# Patient Record
Sex: FEMALE | Race: WHITE | HISPANIC OR LATINO | Employment: FULL TIME | URBAN - METROPOLITAN AREA
[De-identification: names, ages, dates, MRNs, and addresses within clinical notes are randomized per-mention and may not be internally consistent; named-entity substitution may affect disease eponyms.]

---

## 2018-05-09 ENCOUNTER — OFFICE VISIT (OUTPATIENT)
Dept: OBGYN CLINIC | Facility: CLINIC | Age: 43
End: 2018-05-09
Payer: COMMERCIAL

## 2018-05-09 VITALS
DIASTOLIC BLOOD PRESSURE: 79 MMHG | WEIGHT: 193 LBS | HEIGHT: 60 IN | HEART RATE: 73 BPM | BODY MASS INDEX: 37.89 KG/M2 | SYSTOLIC BLOOD PRESSURE: 116 MMHG

## 2018-05-09 DIAGNOSIS — Z72.51 HIGH RISK SEXUAL BEHAVIOR: ICD-10-CM

## 2018-05-09 DIAGNOSIS — Z30.013 ENCOUNTER FOR INITIAL PRESCRIPTION OF INJECTABLE CONTRACEPTIVE: ICD-10-CM

## 2018-05-09 DIAGNOSIS — Z12.39 SCREENING FOR MALIGNANT NEOPLASM OF BREAST: ICD-10-CM

## 2018-05-09 DIAGNOSIS — Z86.32 HISTORY OF GESTATIONAL DIABETES: ICD-10-CM

## 2018-05-09 DIAGNOSIS — Z01.419 ENCOUNTER FOR GYNECOLOGICAL EXAMINATION WITHOUT ABNORMAL FINDING: Primary | ICD-10-CM

## 2018-05-09 LAB — SL AMB POCT URINE HCG: NEGATIVE

## 2018-05-09 PROCEDURE — G0145 SCR C/V CYTO,THINLAYER,RESCR: HCPCS | Performed by: PATHOLOGY

## 2018-05-09 PROCEDURE — 87624 HPV HI-RISK TYP POOLED RSLT: CPT | Performed by: NURSE PRACTITIONER

## 2018-05-09 PROCEDURE — 81025 URINE PREGNANCY TEST: CPT | Performed by: NURSE PRACTITIONER

## 2018-05-09 PROCEDURE — G0124 SCREEN C/V THIN LAYER BY MD: HCPCS | Performed by: PATHOLOGY

## 2018-05-09 PROCEDURE — 87591 N.GONORRHOEAE DNA AMP PROB: CPT | Performed by: NURSE PRACTITIONER

## 2018-05-09 PROCEDURE — 87491 CHLMYD TRACH DNA AMP PROBE: CPT | Performed by: NURSE PRACTITIONER

## 2018-05-09 PROCEDURE — 99386 PREV VISIT NEW AGE 40-64: CPT | Performed by: NURSE PRACTITIONER

## 2018-05-09 RX ORDER — MEDROXYPROGESTERONE ACETATE 150 MG/ML
150 INJECTION, SUSPENSION INTRAMUSCULAR
Qty: 1 ML | Refills: 3 | Status: SHIPPED | OUTPATIENT
Start: 2018-05-09 | End: 2019-04-05 | Stop reason: SDUPTHER

## 2018-05-09 NOTE — PROGRESS NOTES
Diagnoses and all orders for this visit:    Encounter for gynecological examination without abnormal finding    Screening for malignant neoplasm of breast  -     Mammo screening bilateral w cad; Future    Encounter for initial prescription of injectable contraceptive  -     medroxyPROGESTERone (DEPO-PROVERA) 150 mg/mL injection; Inject 1 mL (150 mg total) into the shoulder, thigh, or buttocks every 3 (three) months    History of gestational diabetes  -     Glucose JOSE DE JESUS 2HR 75GM Nonpreg; Future              ASSESSMENT & PLAN: Natalya Sanchez is a 36 y o  G2 G2 with normal gynecologic exam  She has a new partner and desires contraception  She has been using condoms consistently for contraception  Her LMP was 04/29/2018 and still has some spotting today  Her menses are monthly and weekly last 3 days  She has never had a mammogram   She denies any abnormal history  History of gestational diabetes not been checked since she delivered 2 years ago  Patient does not have a PCP  She desires cultures for Chlamydia and gonorrhea    1  Routine well woman exam done today  2  Pap and HPV:  The patient's last pap and hpv was in 2016  It was normal     Pap and cotesting was done today  Current ASCCP Guidelines reviewed  3   Mammogram ordered, has never had a MMG  4  The following were reviewed in today's visit: breast self exam, STD testing, adequate intake of calcium and vitamin D, exercise and healthy diet  Culture for [facility] and CT sent today  5  Patient counseled on Depo Provera and irregular bleeding, weight gain, and bone health  Return to office to begin Depo-Provera after picking it up from the pharmacy, reviewed backup method for 1 week, return to office her next Depo in 11 weeks    CC:  Annual Gynecologic Examination    HPI: Natalya Sanchez is a 36 y o    who presents for annual gynecologic examination    She has the following concerns:  She desires contraception    Health Maintenance:    She wears her seatbelt routinely  She does perform regular monthly self breast exams  She feels safe at home  History reviewed  No pertinent past medical history  Past Surgical History:   Procedure Laterality Date     SECTION       SECTION  2016       Past OB/Gyn History:  OB History     No data available           Pt does not have menstrual issues     History of sexually transmitted infection: No   History of abnormal pap smears: No      Patient is currently sexually active  heterosexual  The current method of family planning is condoms   Family History   Problem Relation Age of Onset    No Known Problems Mother     No Known Problems Father        Social History:  Social History     Social History    Marital status: Single     Spouse name: N/A    Number of children: N/A    Years of education: N/A     Occupational History    Not on file  Social History Main Topics    Smoking status: Never Smoker    Smokeless tobacco: Never Used    Alcohol use No    Drug use: No    Sexual activity: Yes     Partners: Male     Birth control/ protection: None     Other Topics Concern    Not on file     Social History Narrative    No narrative on file     Presently lives with family  Patient is single  No Known Allergies  No current outpatient prescriptions on file  Review of Systems:    Review of Systems  Constitutional :no fever, feels well, no tiredness, no recent weight gain or loss  ENT: no ear ache, no loss of hearing, no nosebleeds or nasal discharge, no sore throat or hoarseness  Cardiovascular: no complaints of slow or fast heart beat, no chest pain, no palpitations, no leg claudication or lower extremity edema    Respiratory: no complaints of shortness of shortness of breath, no VENCES  Breasts:no complaints of breast pain, breast lump, or nipple discharge  Gastrointestinal: no complaints of abdominal pain, constipation, nausea, vomiting, or diarrhea or bloody stools  Genitourinary : no complaints of dysuria, incontinence, pelvic pain, no dysmenorrhea, vaginal discharge or abnormal vaginal bleeding and as noted in HPI  Musculoskeletal: no complaints of arthralgia, no myalgia, no joint swelling or stiffness, no limb pain or swelling  Integumentary: no complaints of skin rash or lesion, itching or dry skin  Neurological: no complaints of headache, no confusion, no numbness or tingling, no dizziness or fainting    Objective      /79 (BP Location: Left arm, Patient Position: Sitting, Cuff Size: Adult)   Pulse 73   Ht 5' (1 524 m)   Wt 87 5 kg (193 lb)   LMP 04/29/2018   Breastfeeding? No   BMI 37 69 kg/m²     General:   appears stated age, cooperative, alert normal mood and affect   Neck: normal, supple,trachea midline, no masses   Heart: regular rate and rhythm, S1, S2 normal, no murmur, click, rub or gallop   Lungs: clear to auscultation bilaterally   Breasts: normal appearance, no masses or tenderness   Abdomen: soft, non-tender, without masses or organomegaly   Vulva: normal female genitalia   Vagina: normal vagina, no discharge, exudate, lesion, or erythema,    Urethra: normal   Cervix: Normal, no discharge  , no lesions, neg CMT   Uterus: normal size, contour, position, consistency, mobility, non-tender, ANSSC   Adnexa: normal adnexa   Lymphatic palpation of lymph nodes in neck, axilla, groin and/or other locations: no lymphadenopathy or masses noted   Skin normal skin turgor and no rashes     Psychiatric orientation to person, place, and time: normal  mood and affect: normal

## 2018-05-09 NOTE — PATIENT INSTRUCTIONS
Please return to office for your Depo injection and in 11 weeks for next Depo    Please complete mammogram

## 2018-05-10 LAB
CHLAMYDIA DNA CVX QL NAA+PROBE: NORMAL
HPV RRNA GENITAL QL NAA+PROBE: NORMAL
N GONORRHOEA DNA GENITAL QL NAA+PROBE: NORMAL

## 2018-05-14 ENCOUNTER — CLINICAL SUPPORT (OUTPATIENT)
Dept: OBGYN CLINIC | Facility: CLINIC | Age: 43
End: 2018-05-14
Payer: COMMERCIAL

## 2018-05-14 DIAGNOSIS — Z30.42 ENCOUNTER FOR DEPO-PROVERA CONTRACEPTION: Primary | ICD-10-CM

## 2018-05-14 PROCEDURE — 96372 THER/PROPH/DIAG INJ SC/IM: CPT

## 2018-05-14 RX ORDER — MEDROXYPROGESTERONE ACETATE 150 MG/ML
150 INJECTION, SUSPENSION INTRAMUSCULAR ONCE
Status: COMPLETED | OUTPATIENT
Start: 2018-05-14 | End: 2018-05-14

## 2018-05-14 RX ADMIN — MEDROXYPROGESTERONE ACETATE 150 MG: 150 INJECTION, SUSPENSION INTRAMUSCULAR at 09:58

## 2018-05-15 LAB
LAB AP GYN PRIMARY INTERPRETATION: NORMAL
Lab: NORMAL
PATH INTERP SPEC-IMP: NORMAL

## 2018-07-30 ENCOUNTER — CLINICAL SUPPORT (OUTPATIENT)
Dept: OBGYN CLINIC | Facility: CLINIC | Age: 43
End: 2018-07-30
Payer: COMMERCIAL

## 2018-07-30 DIAGNOSIS — Z30.42 ENCOUNTER FOR DEPO-PROVERA CONTRACEPTION: Primary | ICD-10-CM

## 2018-07-30 PROCEDURE — 96372 THER/PROPH/DIAG INJ SC/IM: CPT

## 2018-07-30 RX ORDER — MEDROXYPROGESTERONE ACETATE 150 MG/ML
150 INJECTION, SUSPENSION INTRAMUSCULAR ONCE
Status: COMPLETED | OUTPATIENT
Start: 2018-07-30 | End: 2018-07-30

## 2018-07-30 RX ADMIN — MEDROXYPROGESTERONE ACETATE 150 MG: 150 INJECTION, SUSPENSION INTRAMUSCULAR at 16:23

## 2018-10-18 ENCOUNTER — CLINICAL SUPPORT (OUTPATIENT)
Dept: OBGYN CLINIC | Facility: CLINIC | Age: 43
End: 2018-10-18
Payer: COMMERCIAL

## 2018-10-18 DIAGNOSIS — Z30.42 ENCOUNTER FOR SURVEILLANCE OF INJECTABLE CONTRACEPTIVE: Primary | ICD-10-CM

## 2018-10-18 PROCEDURE — 96372 THER/PROPH/DIAG INJ SC/IM: CPT

## 2018-10-18 RX ORDER — MEDROXYPROGESTERONE ACETATE 150 MG/ML
150 INJECTION, SUSPENSION INTRAMUSCULAR ONCE
Status: COMPLETED | OUTPATIENT
Start: 2018-10-18 | End: 2018-10-18

## 2018-10-18 RX ADMIN — MEDROXYPROGESTERONE ACETATE 150 MG: 150 INJECTION, SUSPENSION INTRAMUSCULAR at 12:22

## 2018-10-18 NOTE — PROGRESS NOTES
Depo-Provera      [x]   Patient provided box yes  Sunday Kayla     Last  Annual/Pap Date: 5/9/18   Last Depo date: 7/30/18   Side effects: none   HCG; if applicable: n/a   Given by: Pelon Burroughs CMA   Site: right deltoid   Next appt  due: 1/3/19

## 2018-11-29 ENCOUNTER — TELEPHONE (OUTPATIENT)
Dept: OBGYN CLINIC | Facility: CLINIC | Age: 43
End: 2018-11-29

## 2018-11-29 NOTE — TELEPHONE ENCOUNTER
Friendly reminder call to patient regarding outstanding mammogram order  Message left for patient to call office for assistance in scheduling

## 2019-01-10 ENCOUNTER — CLINICAL SUPPORT (OUTPATIENT)
Dept: OBGYN CLINIC | Facility: CLINIC | Age: 44
End: 2019-01-10

## 2019-01-10 DIAGNOSIS — Z30.42 ENCOUNTER FOR DEPO-PROVERA CONTRACEPTION: Primary | ICD-10-CM

## 2019-01-10 PROCEDURE — 96372 THER/PROPH/DIAG INJ SC/IM: CPT

## 2019-01-10 RX ORDER — MEDROXYPROGESTERONE ACETATE 150 MG/ML
150 INJECTION, SUSPENSION INTRAMUSCULAR ONCE
Status: COMPLETED | OUTPATIENT
Start: 2019-01-10 | End: 2019-01-10

## 2019-01-10 RX ADMIN — MEDROXYPROGESTERONE ACETATE 150 MG: 150 INJECTION, SUSPENSION INTRAMUSCULAR at 12:44

## 2019-01-10 NOTE — PROGRESS NOTES
Depo-Provera      [x]   Patient provided  Jazmín White     Last  Annual/Pap Date: 05/09/2018  Mihai Olivas Last Depo date: 10/18/2018   Side effects: N/A   HCG; if applicable: No   Given by: Bella Ho MA   Site: Right Deltoid   Next injection : 04/01/19

## 2019-04-05 ENCOUNTER — CLINICAL SUPPORT (OUTPATIENT)
Dept: OBGYN CLINIC | Facility: CLINIC | Age: 44
End: 2019-04-05

## 2019-04-05 DIAGNOSIS — Z30.42 ENCOUNTER FOR SURVEILLANCE OF INJECTABLE CONTRACEPTIVE: Primary | ICD-10-CM

## 2019-04-05 DIAGNOSIS — Z30.013 ENCOUNTER FOR INITIAL PRESCRIPTION OF INJECTABLE CONTRACEPTIVE: ICD-10-CM

## 2019-04-05 PROCEDURE — 96372 THER/PROPH/DIAG INJ SC/IM: CPT

## 2019-04-05 RX ORDER — MEDROXYPROGESTERONE ACETATE 150 MG/ML
150 INJECTION, SUSPENSION INTRAMUSCULAR ONCE
Status: COMPLETED | OUTPATIENT
Start: 2019-04-05 | End: 2019-04-05

## 2019-04-05 RX ORDER — MEDROXYPROGESTERONE ACETATE 150 MG/ML
150 INJECTION, SUSPENSION INTRAMUSCULAR
Qty: 1 ML | Refills: 0 | Status: SHIPPED | OUTPATIENT
Start: 2019-04-05 | End: 2019-06-24 | Stop reason: SDUPTHER

## 2019-04-05 RX ADMIN — MEDROXYPROGESTERONE ACETATE 150 MG: 150 INJECTION, SUSPENSION INTRAMUSCULAR at 10:57

## 2019-06-24 ENCOUNTER — TRANSCRIBE ORDERS (OUTPATIENT)
Dept: ADMINISTRATIVE | Facility: HOSPITAL | Age: 44
End: 2019-06-24

## 2019-06-24 ENCOUNTER — OFFICE VISIT (OUTPATIENT)
Dept: OBGYN CLINIC | Facility: CLINIC | Age: 44
End: 2019-06-24

## 2019-06-24 VITALS
BODY MASS INDEX: 38.28 KG/M2 | DIASTOLIC BLOOD PRESSURE: 77 MMHG | HEART RATE: 74 BPM | SYSTOLIC BLOOD PRESSURE: 113 MMHG | WEIGHT: 196 LBS

## 2019-06-24 DIAGNOSIS — Z30.013 ENCOUNTER FOR INITIAL PRESCRIPTION OF INJECTABLE CONTRACEPTIVE: ICD-10-CM

## 2019-06-24 DIAGNOSIS — Z30.42 ENCOUNTER FOR DEPO-PROVERA CONTRACEPTION: ICD-10-CM

## 2019-06-24 DIAGNOSIS — Z12.39 SCREENING FOR MALIGNANT NEOPLASM OF BREAST: ICD-10-CM

## 2019-06-24 DIAGNOSIS — Z86.32 HISTORY OF GESTATIONAL DIABETES MELLITUS (GDM): ICD-10-CM

## 2019-06-24 DIAGNOSIS — Z01.419 ENCOUNTER FOR GYNECOLOGICAL EXAMINATION WITHOUT ABNORMAL FINDING: Primary | ICD-10-CM

## 2019-06-24 DIAGNOSIS — Z12.39 ENCOUNTER FOR SCREENING FOR MALIGNANT NEOPLASM OF BREAST: Primary | ICD-10-CM

## 2019-06-24 PROCEDURE — G0145 SCR C/V CYTO,THINLAYER,RESCR: HCPCS | Performed by: NURSE PRACTITIONER

## 2019-06-24 PROCEDURE — 87624 HPV HI-RISK TYP POOLED RSLT: CPT | Performed by: NURSE PRACTITIONER

## 2019-06-24 PROCEDURE — 96372 THER/PROPH/DIAG INJ SC/IM: CPT | Performed by: NURSE PRACTITIONER

## 2019-06-24 PROCEDURE — S0612 ANNUAL GYNECOLOGICAL EXAMINA: HCPCS | Performed by: NURSE PRACTITIONER

## 2019-06-24 RX ORDER — MEDROXYPROGESTERONE ACETATE 150 MG/ML
150 INJECTION, SUSPENSION INTRAMUSCULAR
Qty: 1 ML | Refills: 3 | Status: SHIPPED | OUTPATIENT
Start: 2019-06-24 | End: 2020-07-09 | Stop reason: SDUPTHER

## 2019-06-24 RX ORDER — MEDROXYPROGESTERONE ACETATE 150 MG/ML
150 INJECTION, SUSPENSION INTRAMUSCULAR ONCE
Status: COMPLETED | OUTPATIENT
Start: 2019-06-24 | End: 2019-06-24

## 2019-06-24 RX ADMIN — MEDROXYPROGESTERONE ACETATE 150 MG: 150 INJECTION, SUSPENSION INTRAMUSCULAR at 10:10

## 2019-06-27 LAB
HPV HR 12 DNA CVX QL NAA+PROBE: NEGATIVE
HPV16 DNA CVX QL NAA+PROBE: NEGATIVE
HPV18 DNA CVX QL NAA+PROBE: NEGATIVE

## 2019-06-28 LAB
LAB AP GYN PRIMARY INTERPRETATION: NORMAL
LAB AP LMP: NORMAL
Lab: NORMAL

## 2019-07-26 ENCOUNTER — HOSPITAL ENCOUNTER (OUTPATIENT)
Dept: MAMMOGRAPHY | Facility: HOSPITAL | Age: 44
Discharge: HOME | End: 2019-07-26
Payer: COMMERCIAL

## 2019-07-26 VITALS — HEIGHT: 60 IN | WEIGHT: 196 LBS | BODY MASS INDEX: 38.48 KG/M2

## 2019-07-26 DIAGNOSIS — Z12.39 SCREENING FOR MALIGNANT NEOPLASM OF BREAST: ICD-10-CM

## 2019-07-26 PROCEDURE — 77067 SCR MAMMO BI INCL CAD: CPT

## 2019-08-09 ENCOUNTER — TELEPHONE (OUTPATIENT)
Dept: MAMMOGRAPHY | Facility: CLINIC | Age: 44
End: 2019-08-09

## 2019-08-16 ENCOUNTER — TELEPHONE (OUTPATIENT)
Dept: MAMMOGRAPHY | Facility: CLINIC | Age: 44
End: 2019-08-16

## 2019-08-16 NOTE — TELEPHONE ENCOUNTER
Epic email sent to PETER Severino,    We were unsuccessful in contacting the above patient for her diagnostic follow up mammogram/ultrasound  I have left messages in Kiswahili for her on 7/29 and 8/9 with no response to schedule  Please attempt to contact this patient and have them schedule their appointment  Please let me know if you have any questions or if I can be of any further assistance      Thank you,  Pablo Cornell, MSN, RN  Breast Nurse Navigator

## 2019-08-26 ENCOUNTER — TELEPHONE (OUTPATIENT)
Dept: OBGYN CLINIC | Facility: CLINIC | Age: 44
End: 2019-08-26

## 2019-08-26 ENCOUNTER — TELEPHONE (OUTPATIENT)
Dept: MAMMOGRAPHY | Facility: CLINIC | Age: 44
End: 2019-08-26

## 2019-08-26 NOTE — TELEPHONE ENCOUNTER
Spoke with Mohini Gama from [facility] that we attempted to contact this patient on 7/29, 8/9, 8/20 and 8/26 leaving messages in Portuguese each time with no response, adv I did see the message that the patient sent in my chart asking us to contact her at the given number in [address] [address] that it was the same number that we had been calling and leaving a message in [address] each time with no return call  Lucie Mcardle that today was my last attempt and I was closing the recommendation for no response from the patient  Mohini Kirknando states she was going to respond in my chart with a message to either contact me with my number or central scheduling

## 2019-08-26 NOTE — TELEPHONE ENCOUNTER
----- Message from Geovany Ventura sent at 8/24/2019  7:44 AM EDT -----  Regarding: Non-Urgent Medical Question  Contact: [phone] sería posible q di montgomery en español por favor

## 2019-09-18 ENCOUNTER — CLINICAL SUPPORT (OUTPATIENT)
Dept: OBGYN CLINIC | Facility: CLINIC | Age: 44
End: 2019-09-18

## 2019-09-18 DIAGNOSIS — Z30.42 ENCOUNTER FOR DEPO-PROVERA CONTRACEPTION: Primary | ICD-10-CM

## 2019-09-18 PROCEDURE — 96372 THER/PROPH/DIAG INJ SC/IM: CPT

## 2019-09-18 RX ORDER — MEDROXYPROGESTERONE ACETATE 150 MG/ML
150 INJECTION, SUSPENSION INTRAMUSCULAR ONCE
Status: COMPLETED | OUTPATIENT
Start: 2019-09-18 | End: 2019-09-18

## 2019-09-18 RX ADMIN — MEDROXYPROGESTERONE ACETATE 150 MG: 150 INJECTION, SUSPENSION INTRAMUSCULAR at 09:34

## 2019-09-18 NOTE — PROGRESS NOTES
Depo-Provera      [x]   Patient provided box Darryle Pian     Last  Annual/Pap Date: 6/24/19  Shravan Raza Last Depo date: 6/24/19   Side effects: no   HCG: no  o if applicable: negative   Given by: Ramy Velez MA   Site: Right deltoid     Calcium supplement daily teaching, condoms for 2 weeks following first injection dose

## 2019-09-18 NOTE — PATIENT INSTRUCTIONS
Medroxiprogesterona (Por inyección)   Magdalena un embarazo  También sirve para tratar la endometriosis y se [place] con otros medicamentos para ayudar a [employer] de cáncer, incluyendo el cáncer de [place] o de [place]  Ovidio(s) : Depo-Provera, Depo-Provera Contraceptive, Depo-SubQ Provera 104   Existen muchas otras marcas de [place]  Jocelynn medicamento no debe ser usado cuando:   Jocelynn medicamento no es adecuado para todas las personas  No lo reciba si usted ha tenido darrian reacción alérgica a la medroxiprogesterona o si usted tiene antecedentes de cáncer de seno o coágulos de bing (incluyendo ataque al corazón o un derrame cerebral)  En la [employer], no debe usar jocelynn medicamento altagracia el [facility]  Forma de usar jocelynn medicamento:   Inyectable  · Fern Beryl enfermera u otro médico le administrará jocelynn medicamento  Jocelynn medicamento se administra hero darrian inyección en un músculo o debajo de la piel  · Aguirre horario exacto del tratamiento depende de la razón por la cual usted está usando jocelynn medicamento  Aguirre médico le explicará aguirre horario personal    ¨ Para tratamiento de los síntomas de cáncer, usted puede comenzar con darrian inyección darrian vez por semana  Es posible que necesite menos inyecciones a medida que avance aguirre tratamiento  ¨ Para control de natalidad o endometriosis, usted necesitará darrian inyección cada 3 meses (13 semanas)  Abena y siga las instrucciones para el paciente que vienen con el medicamento  Hable con aguirre médico o farmacéutico si tiene alguna pregunta  ¨ Puede que necesite recibir aguirre primera [employer] primeros 5 días de aguirre periodo menstrual normal, para asegurarse de que no está embarazada  Si usted acaba de tener un bebé, puede recibir darrian inyección 5 días después del parto si no está dando de lactar o 6 semanas después del parto si está dando de lactar  · Si olvida darrian dosis: Debe recibir darrian inyección cada 3 meses si usted quiere evitar el embarazo   Hable con aguirre médico o farmacéutico si usted no recibe barron medicamento a tiempo, porque usted puede necesitar otra forma de control de natalidad  Medicamentos y [employer] que debe evitar:   Consulte con barron médico o farmacéutico antes de usar cualquier medicamento, incluyendo los que compra sin receta médica, las vitaminas y los productos herbales  · Algunos alimentos y medicamentos pueden afectar la eficacia de medroxiprogesterona  Informe a barron médico si está usando cualquiera de los siguientes:  ¨ Aminoglutetimida, bosentan, carbamazepina, felbamato, griseofulvina, nefazodona, oxcarbazepina, fenobarbital, fenitoína, rifabutina, rifampicina, rifapentina, hierba de [address] Medicamento para el tratamiento de darrian infección (incluyendo claritromicina, itraconazol, ketoconazol, telitromicina, voriconazol)  ¨ Medicamentos para tratar el VIH / Zoya Knack (incluyendo atazanavir, indinavir, nelfinavir, ritonavir, saquinavir)  Precauciones altagracia el uso de raic medicamento:   · Informe a barron médico de inmediato si usted doni que quedó [place]  · Informe a barron médico si usted está dando de lactar o si tiene enfermedad del hígado, enfermedad del riñón, asma, diabetes, enfermedades del corazón, convulsiones, liza de shelby por migraña, un trastorno alimenticio, osteoporosis o antecedentes de depresión  Dígale a barron médico si usted fuma  · Aric medicamento puede provocarle los siguientes problemas:  ¨ Coágulos de [place], lo que podría llevar a un derrame cerebral, ataque al corazón u otros problemas graves  ¨ Posible aumento del riesgo de cáncer de seno  ¨ Huesos débiles o delgados, especialmente con el uso a maya plazo  · Usted no debe usar aric medicamento hero método anticonceptivo a [place]  a menos que usted no pueda usar ningún otro método anticonceptivo    · Aric medicamento no la protegerá contra el VIH / Zoya Fareed u otras enfermedades de transmisión sexual   · Dígale a todo médico o dentista encargado de atenderle que usted [place] usando [employer]  Puede que aric medicamento afecte algunos resultados de [place]  · Aguirre médico tendrá que revisar aguirre progreso y los efectos de aric medicamento altagracia gricelda citas regulares  [place] sin falta con todas gricelda citas médicas  Asista a todas gricelda citas  Efectos secundarios que pueden presentarse altagracia el uso de aric medicamento:   Consulte inmediatamente con el médico si nota cualquiera de estos efectos secundarios:  · Reacción alérgica: Comezón o ronchas, hinchazón del arsen o las nicolasa, hinchazón u hormigueo en la boca o garganta, opresión en el pecho, dificultad para respirar  · Dolor en el pecho, dificultad para respirar, o toser bing  · [employer] o heces pálidas, náuseas, vómitos, falta de apetito, dolor estomacal, coloración amarillenta en la piel u ojos  · Sangrado vaginal abundante o que no para  · Perdida de la visión o visión doble  · Adormecimiento o debilidad en un lado del cuerpo, dolor de shelby repentino o severo, problemas con el habla, la visión o para caminar  · Dolor severo de estómago o calambres  Consulte con el médico si nota los siguientes efectos secundarios menos graves:   · Dolor de shelby  · Períodos mensuales ligeros o falta del periodo, manchado entre períodos  · Nerviosismo o mareos  · Dolor, enrojecimiento, ardor, inflamación, o un bulto bajo la piel donde se aplicó la inyección  · Aumento de peso  Consulte con el médico si nota otros efectos secundarios que doni son causados por aric medicamento  Llame a aguirre médico para consultarle Cody Cortes puede notificar gricelda efectos secundarios al FDA al [id number]  © 2017 [address] Information is for End User's use only and may not be sold, redistributed or otherwise used for commercial purposes  Esta información es sólo para uso en educación  Aguirre intención no es darle un consejo médico sobre enfermedades o tratamientos   Colsulte con aguirre Herminia Scott farmacéutico antes de seguir cualquier régimen médico para saber si es seguro y efectivo para usted

## 2019-10-07 ENCOUNTER — HOSPITAL ENCOUNTER (OUTPATIENT)
Dept: ULTRASOUND IMAGING | Facility: CLINIC | Age: 44
Discharge: HOME | End: 2019-10-07
Payer: COMMERCIAL

## 2019-10-07 ENCOUNTER — HOSPITAL ENCOUNTER (OUTPATIENT)
Dept: MAMMOGRAPHY | Facility: CLINIC | Age: 44
Discharge: HOME | End: 2019-10-07
Payer: COMMERCIAL

## 2019-10-07 DIAGNOSIS — R92.8 ABNORMAL MAMMOGRAM: ICD-10-CM

## 2019-10-07 PROCEDURE — 77066 DX MAMMO INCL CAD BI: CPT

## 2019-10-07 PROCEDURE — G0279 TOMOSYNTHESIS, MAMMO: HCPCS

## 2019-10-09 ENCOUNTER — TELEPHONE (OUTPATIENT)
Dept: OBGYN CLINIC | Facility: CLINIC | Age: 44
End: 2019-10-09

## 2019-10-14 ENCOUNTER — TELEPHONE (OUTPATIENT)
Dept: OBGYN CLINIC | Facility: CLINIC | Age: 44
End: 2019-10-14

## 2019-10-14 NOTE — TELEPHONE ENCOUNTER
----- Message from Jie Yu sent at 10/8/2019 10:21 AM EDT -----      ----- Message -----  From: PETER Medley  Sent: 10/8/2019   8:05 AM EDT  To: [id number] Demetrius Karimi    Please call patient to inform that her mammogram was negative, rescreen due in 1 year      Thank you

## 2019-12-09 ENCOUNTER — CLINICAL SUPPORT (OUTPATIENT)
Dept: OBGYN CLINIC | Facility: CLINIC | Age: 44
End: 2019-12-09

## 2019-12-09 DIAGNOSIS — Z30.013 ENCOUNTER FOR INITIAL PRESCRIPTION OF INJECTABLE CONTRACEPTIVE: Primary | ICD-10-CM

## 2019-12-09 DIAGNOSIS — Z30.42 ENCOUNTER FOR SURVEILLANCE OF INJECTABLE CONTRACEPTIVE: ICD-10-CM

## 2019-12-09 PROCEDURE — 96372 THER/PROPH/DIAG INJ SC/IM: CPT

## 2019-12-09 RX ORDER — MEDROXYPROGESTERONE ACETATE 150 MG/ML
150 INJECTION, SUSPENSION INTRAMUSCULAR ONCE
Status: COMPLETED | OUTPATIENT
Start: 2019-12-09 | End: 2019-12-09

## 2019-12-09 RX ADMIN — MEDROXYPROGESTERONE ACETATE 150 MG: 150 INJECTION, SUSPENSION INTRAMUSCULAR at 09:32

## 2019-12-09 NOTE — PROGRESS NOTES
Depo-Provera      [x]   Patient provided box yes   o 1 Refills remain   Last  Annual Date: 06/24/2019  Rubén Puri Last Depo date: 09/18/2019   Side effects: no   HCG: no   Given by: EV   Site: left deltoid     Calcium supplement daily teaching, condoms for 2 weeks following first injection dose

## 2020-03-02 ENCOUNTER — CLINICAL SUPPORT (OUTPATIENT)
Dept: OBGYN CLINIC | Facility: CLINIC | Age: 45
End: 2020-03-02

## 2020-03-02 DIAGNOSIS — Z30.42 ENCOUNTER FOR DEPO-PROVERA CONTRACEPTION: ICD-10-CM

## 2020-03-02 PROCEDURE — 96372 THER/PROPH/DIAG INJ SC/IM: CPT

## 2020-03-02 RX ORDER — MEDROXYPROGESTERONE ACETATE 150 MG/ML
150 INJECTION, SUSPENSION INTRAMUSCULAR
Status: SHIPPED | OUTPATIENT
Start: 2020-03-02

## 2020-03-02 RX ADMIN — MEDROXYPROGESTERONE ACETATE 150 MG: 150 INJECTION, SUSPENSION INTRAMUSCULAR at 09:38

## 2020-03-02 NOTE — PROGRESS NOTES
Depo-Provera      [x]   Patient provided box yes   o 1 Refills remain   Last  Annual Date: 6/2019  Sonal Splinter Last Depo date: 12/17/19   Side effects: no   HCG: no   Given by: Keely Laurent Site: left deltoid     Calcium supplement daily teaching, condoms for 2 weeks following first injection dose

## 2020-05-21 ENCOUNTER — TELEPHONE (OUTPATIENT)
Dept: OBGYN CLINIC | Facility: CLINIC | Age: 45
End: 2020-05-21

## 2020-05-22 ENCOUNTER — CLINICAL SUPPORT (OUTPATIENT)
Dept: OBGYN CLINIC | Facility: CLINIC | Age: 45
End: 2020-05-22

## 2020-05-22 DIAGNOSIS — Z30.42 ENCOUNTER FOR DEPO-PROVERA CONTRACEPTION: ICD-10-CM

## 2020-05-22 PROCEDURE — 96372 THER/PROPH/DIAG INJ SC/IM: CPT

## 2020-05-22 RX ADMIN — MEDROXYPROGESTERONE ACETATE 150 MG: 150 INJECTION, SUSPENSION INTRAMUSCULAR at 10:19

## 2020-07-09 ENCOUNTER — ANNUAL EXAM (OUTPATIENT)
Dept: OBGYN CLINIC | Facility: CLINIC | Age: 45
End: 2020-07-09

## 2020-07-09 VITALS
SYSTOLIC BLOOD PRESSURE: 110 MMHG | HEART RATE: 70 BPM | WEIGHT: 193.6 LBS | RESPIRATION RATE: 16 BRPM | HEIGHT: 60 IN | DIASTOLIC BLOOD PRESSURE: 76 MMHG | BODY MASS INDEX: 38.01 KG/M2

## 2020-07-09 DIAGNOSIS — Z12.31 ENCOUNTER FOR SCREENING MAMMOGRAM FOR MALIGNANT NEOPLASM OF BREAST: ICD-10-CM

## 2020-07-09 DIAGNOSIS — Z01.419 ENCOUNTER FOR GYNECOLOGICAL EXAMINATION WITHOUT ABNORMAL FINDING: Primary | ICD-10-CM

## 2020-07-09 DIAGNOSIS — Z30.42 ENCOUNTER FOR SURVEILLANCE OF INJECTABLE CONTRACEPTIVE: ICD-10-CM

## 2020-07-09 PROCEDURE — S0612 ANNUAL GYNECOLOGICAL EXAMINA: HCPCS | Performed by: NURSE PRACTITIONER

## 2020-07-09 RX ORDER — MEDROXYPROGESTERONE ACETATE 150 MG/ML
150 INJECTION, SUSPENSION INTRAMUSCULAR
Qty: 1 ML | Refills: 4 | Status: SHIPPED | OUTPATIENT
Start: 2020-07-09 | End: 2021-07-29 | Stop reason: SDUPTHER

## 2020-07-09 NOTE — PROGRESS NOTES
ASSESSMENT & PLAN: Eric Stokes is a 38 y o  [id number] with normal gynecologic exam    02 08 70 26 99 used    1  Routine well woman exam done today  2  Pap and HPV:  The patient's last pap and hpv was  06/24/2019  It was normal    Patient had a Pap 5 /9 / 2018 and was LGSIL, negative high-risk HPV  Her Pap done in 2016 was negative  Pap and cotesting was not done today  Current ASCCP Guidelines reviewed  Would repeat Pap  And HPV in 2022  3  Mammogram ordered- 3D screening mammogram due 10/2020  4  The following were reviewed in today's visit: breast self exam, STD testing, HIV risk factors and prevention, adequate intake of calcium and vitamin D, exercise and healthy diet  5  Gardisil and woman at age 38 reviewed- patient to check with insurance,  Information Portuguese provided  6  Currently on Depo wants to continue for contraception- prescription renewed for 1 year    CC:  Annual Gynecologic Examination    HPI: Eric Stokes is a 38 y o  [id number] who presents for annual gynecologic examination  She had a bilateral diagnostic 3D mammogram on 10/07/2019 abnormal screening mammogram- overall BI-RADS 1 - routine re-screening 1 year  Next mammogram due 10/2020  She has the following concerns:   Like to stay on Depo, reports sometimes she gets clots but her flow is light,  She denies any cramps  currently same partner,  Declines STD testing    Health Maintenance:    She wears her seatbelt routinely  She does perform regular monthly self breast exams  She feels safe at home  BMI Counseling: Body mass index is 37 81 kg/m²  The BMI is above normal  Nutrition recommendations include reducing portion sizes, 3-5 servings of fruits/vegetables daily, consuming healthier snacks, moderation in carbohydrate intake and reducing intake of cholesterol  Exercise recommendations include exercising 3-5 times per week      Patient Active Problem List   Diagnosis    Encounter for Depo-Provera contraception    Screening for malignant neoplasm of breast    Encounter for gynecological examination without abnormal finding       No past medical history on file  Past Surgical History:   Procedure Laterality Date     SECTION       SECTION         Past OB/Gyn History:  OB History        2    Para   2    Term   2            AB        Living   2       SAB        TAB        Ectopic        Multiple        Live Births   2                  Pt does not have menstrual issues  Currently on Depo   History of sexually transmitted infection: No   History of abnormal pap smears: Yes  Follow-up negative  Patient is currently sexually active  heterosexual  The current method of family planning is Depo-Provera injections      Family History   Problem Relation Age of Onset    No Known Problems Mother     Lung cancer Father 62        smoker       Social History:  Social History     Socioeconomic History    Marital status:      Spouse name: Not on file    Number of children: Not on file    Years of education: Not on file    Highest education level: Not on file   Occupational History    Not on file   Social Needs    Financial resource strain: Not on file    Food insecurity:     Worry: Not on file     Inability: Not on file    Transportation needs:     Medical: Not on file     Non-medical: Not on file   Tobacco Use    Smoking status: Never Smoker    Smokeless tobacco: Never Used   Substance and Sexual Activity    Alcohol use: No    Drug use: No    Sexual activity: Yes     Partners: Male     Birth control/protection: Injection   Lifestyle    Physical activity:     Days per week: Not on file     Minutes per session: Not on file    Stress: Not on file   Relationships    Social connections:     Talks on phone: Not on file     Gets together: Not on file     Attends Yarsani service: Not on file     Active member of club or organization: Not on file     Attends meetings of clubs or organizations: Not on file     Relationship status: Not on file    Intimate partner violence:     Fear of current or ex partner: Not on file     Emotionally abused: Not on file     Physically abused: Not on file     Forced sexual activity: Not on file   Other Topics Concern    Not on file   Social History Narrative    Not on file     Presently lives with  family  Patient is single  Patient is currently employed   No Known Allergies    Current Outpatient Medications:     medroxyPROGESTERone (DEPO-PROVERA) 150 mg/mL injection, Inject 1 mL (150 mg total) into a muscle every 3 (three) months, Disp: 1 mL, Rfl: 3    Current Facility-Administered Medications:     medroxyPROGESTERone (DEPO-PROVERA) IM injection 150 mg, 150 mg, Intramuscular, Q3 Months, PETER Iverson, 150 mg at 05/22/20 1019    Review of Systems:    Review of Systems  Constitutional :no fever, feels well, no tiredness, no recent weight gain or loss  ENT: no ear ache, no loss of hearing, no nosebleeds or nasal discharge, no sore throat or hoarseness  Cardiovascular: no complaints of slow or fast heart beat, no chest pain, no palpitations, no leg claudication or lower extremity edema  Respiratory: no complaints of shortness of shortness of breath, no VENCES  Breasts:no complaints of breast pain, breast lump, or nipple discharge  Gastrointestinal: no complaints of abdominal pain, constipation, nausea, vomiting, or diarrhea or bloody stools  Genitourinary : no complaints of dysuria, incontinence, pelvic pain, no dysmenorrhea, vaginal discharge or abnormal vaginal bleeding and as noted in HPI  Musculoskeletal: no complaints of arthralgia, no myalgia, no joint swelling or stiffness, no limb pain or swelling    Integumentary: no complaints of skin rash or lesion, itching or dry skin  Neurological: no complaints of headache, no confusion, no numbness or tingling, no dizziness or fainting    Objective      There were no vitals taken for this visit  General:   appears stated age, cooperative, alert normal mood and affect   Neck: normal, supple,trachea midline, no masses   Heart: regular rate and rhythm, S1, S2 normal, no murmur, click, rub or gallop   Lungs: clear to auscultation bilaterally   Breasts: normal appearance, no masses or tenderness, Inspection negative, No nipple retraction or dimpling, No nipple discharge or bleeding, No axillary or supraclavicular adenopathy, Normal to palpation without dominant masses, Taught monthly breast self examination   Abdomen: soft, non-tender, without masses or organomegaly   Vulva: normal female genitalia, Bartholin's, Urethra, [place] normal   Vagina: normal vagina, no discharge, exudate, lesion, or erythema   Urethra: normal   Cervix: Normal, no discharge  Nontender  Uterus: normal size, contour, position, consistency, mobility, non-tender and mobile   Adnexa: normal adnexa   Lymphatic palpation of lymph nodes in neck, axilla, groin and/or other locations: no lymphadenopathy or masses noted   Skin normal skin turgor and no rashes     Psychiatric orientation to person, place, and time: normal  mood and affect: normal Z Plasty Text: The lesion was extirpated to the level of the fat with a #15 scalpel blade.  Given the location of the defect, shape of the defect and the proximity to free margins a Z-plasty was deemed most appropriate for repair.  Using a sterile surgical marker, the appropriate transposition arms of the Z-plasty were drawn incorporating the defect and placing the expected incisions within the relaxed skin tension lines where possible.    The area thus outlined was incised deep to adipose tissue with a #15 scalpel blade.  The skin margins were undermined to an appropriate distance in all directions utilizing iris scissors.  The opposing transposition arms were then transposed into place in opposite direction and anchored with interrupted buried subcutaneous sutures.

## 2020-07-09 NOTE — PATIENT INSTRUCTIONS
Covid - 19 instructions    If you are having any of the following     Cough   Shortness of breath   Fever  If traveled internationally or to high risk [place] states  Or been in contact with someone that has     Please call:    [phone]  option 7    They will screen you and direct you to the nearest testing location     Should not come to the PCP or OB office without calling that number first             check with insurance for Gardasil coverage

## 2020-08-14 ENCOUNTER — TELEPHONE (OUTPATIENT)
Dept: OBGYN CLINIC | Facility: CLINIC | Age: 45
End: 2020-08-14

## 2020-08-21 ENCOUNTER — CLINICAL SUPPORT (OUTPATIENT)
Dept: OBGYN CLINIC | Facility: CLINIC | Age: 45
End: 2020-08-21

## 2020-08-21 DIAGNOSIS — Z30.42 ENCOUNTER FOR DEPO-PROVERA CONTRACEPTION: ICD-10-CM

## 2020-08-21 PROCEDURE — 96372 THER/PROPH/DIAG INJ SC/IM: CPT

## 2020-08-21 RX ADMIN — MEDROXYPROGESTERONE ACETATE 150 MG: 150 INJECTION, SUSPENSION INTRAMUSCULAR at 10:14

## 2020-08-21 NOTE — PROGRESS NOTES
Depo-Provera      [x]   Patient provided box yes  o 4 Refills remain   Last  Annual Date: 7/2020  [facility] Last Depo date: 5/22/20   Side effects: no   HCG: no   Given by: Bunny Pimentel CMA   Site: Right deltoid   Calcium supplement daily teaching, condoms for 2 weeks following first injection dose

## 2020-11-06 ENCOUNTER — TELEPHONE (OUTPATIENT)
Dept: OBGYN CLINIC | Facility: CLINIC | Age: 45
End: 2020-11-06

## 2020-11-12 ENCOUNTER — TELEPHONE (OUTPATIENT)
Dept: OBGYN CLINIC | Facility: CLINIC | Age: 45
End: 2020-11-12

## 2020-11-13 ENCOUNTER — CLINICAL SUPPORT (OUTPATIENT)
Dept: OBGYN CLINIC | Facility: CLINIC | Age: 45
End: 2020-11-13

## 2020-11-13 DIAGNOSIS — Z30.42 ENCOUNTER FOR DEPO-PROVERA CONTRACEPTION: ICD-10-CM

## 2020-11-13 PROCEDURE — 96372 THER/PROPH/DIAG INJ SC/IM: CPT

## 2020-11-13 RX ADMIN — MEDROXYPROGESTERONE ACETATE 150 MG: 150 INJECTION, SUSPENSION INTRAMUSCULAR at 09:45

## 2021-02-09 ENCOUNTER — TELEPHONE (OUTPATIENT)
Dept: OBGYN CLINIC | Facility: CLINIC | Age: 46
End: 2021-02-09

## 2021-02-09 NOTE — TELEPHONE ENCOUNTER

## 2021-02-10 ENCOUNTER — CLINICAL SUPPORT (OUTPATIENT)
Dept: OBGYN CLINIC | Facility: CLINIC | Age: 46
End: 2021-02-10

## 2021-02-10 DIAGNOSIS — Z30.42 ENCOUNTER FOR DEPO-PROVERA CONTRACEPTION: Primary | ICD-10-CM

## 2021-02-10 PROCEDURE — 96372 THER/PROPH/DIAG INJ SC/IM: CPT

## 2021-02-10 RX ORDER — MEDROXYPROGESTERONE ACETATE 150 MG/ML
150 INJECTION, SUSPENSION INTRAMUSCULAR ONCE
Status: COMPLETED | OUTPATIENT
Start: 2021-02-10 | End: 2021-02-10

## 2021-02-10 RX ADMIN — MEDROXYPROGESTERONE ACETATE 150 MG: 150 INJECTION, SUSPENSION INTRAMUSCULAR at 09:41

## 2021-02-10 NOTE — PROGRESS NOTES
Depo-Provera      [x]   Patient provided box yes   o 2 Refills remain  o Refills submitted no   Last  Annual Date / Birth control check : 07/09/2020   Last Depo date: 11/13/2020   Side effects: no   HCG: no  o if applicable: negative   Given by: ev   Site: left deltoid    o Calcium supplement daily teaching  o Condoms for 2 weeks following first injection dose

## 2021-02-10 NOTE — PATIENT INSTRUCTIONS
Medroxiprogesterona (Por inyección)   Medroxyprogesterone (et-ocqa-mn-proe-ADALID-ter-one)  Magdalena un embarazo  También sirve para tratar la endometriosis y se [place] con otros medicamentos para ayudar a [employer] de cáncer, incluyendo el cáncer de Denise Shoulder o de [place]  Ovidio(s) : Depo-Provera, Depo-Provera Contraceptive, Depo-SubQ Provera 104, medroxyPROGESTERone acetate Novaplus   Existen muchas otras marcas de [facility]  Jocelynn medicamento no debe ser usado cuando:   Jocelynn medicamento no es adecuado para todas las personas  No lo reciba si usted ha tenido darrian reacción alérgica a la medroxiprogesterona o si usted tiene antecedentes de cáncer de seno o coágulos de bing (incluyendo ataque al corazón o un derrame cerebral)  En la [employer], no debe usar jocelynn medicamento altagracia el [facility]  Forma de usar jocelynn medicamento:   Inyectable  · Yuki Daughters enfermera u otro médico le administrará jocelynn medicamento  Jocelynn medicamento se administra hero darrian inyección en un músculo o debajo de la piel  · Barron horario exacto del tratamiento depende de la razón por la cual usted está usando jocelynn medicamento  Barron médico le explicará barron horario personal    ? Para tratamiento de los síntomas de cáncer, usted puede comenzar con darrian inyección darrian vez por semana  Es posible que necesite menos inyecciones a medida que avance barron tratamiento  ? Para control de natalidad o endometriosis, usted necesitará darrian inyección cada 3 meses (13 semanas)  ? Puede que necesite recibir barron primera inyección altagracia los primeros 5 días de barron periodo menstrual normal, para asegurarse de que no está embarazada  Si usted acaba de tener un bebé, puede recibir darrian inyección 5 días después del parto si no está dando de lactar o 6 semanas después del parto si está dando de lactar  · Abena y siga las instrucciones para el paciente que vienen con el medicamento  Hable con barron médico o farmacéutico si tiene alguna pregunta    · Si olvida darrian dosis: Shantal Castillo recibir  Bold inyección cada 3 meses si usted quiere evitar el embarazo  Hable con barron médico o farmacéutico si usted no recibe barron medicamento a tiempo, porque usted puede necesitar otra forma de control de natalidad  Medicamentos y [employer] que debe evitar:   Consulte con barron médico o farmacéutico antes de usar cualquier medicamento, incluyendo los que compra sin receta médica, las vitaminas y los productos herbales  · Algunos medicamentos pueden afectar el funcionamiento de la medroxiprogesterona  Informe a barron médico si está usando cualquiera de los siguientes:  ? Aminoglutetimida, bosentán, carbamazepina, felbamato, griseofulvina, mitotano, modafinilo, nefazodona, oxcarbazepina, fenobarbital, fenitoína, rifabutina, rifampicina, rifapentina, [address], [place]  ? Medicamento para el tratamiento de darrian infección (incluyendo claritromicina, itraconazol, ketoconazol, telitromicina, voriconazol)  ? Medicamentos para tratar el VIH/SIDA (incluyendo atazanavir, indinavir, nelfinavir, ritonavir, saquinavir)  Precauciones altagracia el uso de aric medicamento:   · Informe a barron médico de inmediato si usted doni que Leigh Ann Donning  · Informe a barron médico si usted está amamantando o si tiene enfermedad renal, enfermedad hepática, asma, diabetes, enfermedades cardíaca, convulsiones, liza de shelby por migraña, trastorno alimenticio, osteoporosis o antecedentes de depresión  Dígale a barron médico si usted fuma  · Aric medicamento puede causar los siguientes problemas:  ? Coágulos de Diana, lo que podría llevar a un derrame cerebral, ataque al corazón u otros problemas graves  ? Posible aumento del riesgo de cáncer de seno  ? Huesos débiles o delgados, especialmente con el uso a maya plazo  · Usted no debe usar aric medicamento hero método anticonceptivo a maya plazo a menos que usted no pueda usar ningún otro método anticonceptivo    · Aric medicamento no la protegerá contra Vimal Medina / Tiffanie Low u otras enfermedades de transmisión sexual   · Dígale a todo médico o dentista encargado de atenderle que usted está usando aric medicamento  Puede que aric medicamento afecte algunos resultados de [employer]  · Barron médico tendrá que revisar barron progreso y los efectos de aric medicamento altagracia gricelda citas regulares  [place] sin falta con todas gricelda citas médicas  Asista a todas gricelda citas  Efectos secundarios que pueden presentarse altagracia el uso de aric medicamento:   Consulte inmediatamente con el médico si nota cualquiera de estos efectos secundarios:  · Reacción alérgica: Comezón o ronchas, hinchazón del arsen o las nicolasa, hinchazón u hormigueo en la boca o garganta, opresión en el pecho, dificultad para respirar  · Dolor en el pecho, dificultad para respirar, o toser bing  · [employer] o heces pálidas, náuseas, vómitos, falta de apetito, dolor estomacal, coloración amarillenta en la piel u ojos  · Sangrado vaginal abundante o que no para  · Perdida de la visión o visión doble  · Adormecimiento o debilidad en un lado del cuerpo, dolor de shelby repentino o severo, problemas con el habla, la visión o para caminar  · Dolor severo de estómago o calambres  Consulte con el médico si nota los siguientes efectos secundarios menos graves:   · Dolor de shelby  · Períodos mensuales ligeros o falta del periodo, manchado entre períodos  · Nerviosismo o mareos  · Dolor, enrojecimiento, ardor, inflamación, o un bulto bajo la piel donde se aplicó la inyección  · Aumento de peso  Consulte con el médico si nota otros efectos secundarios que doni son causados por aric medicamento  Llame a barron médico para consultarle Cody Cortes puede notificar gricelda efectos secundarios al FDA al [phone]  © [address] Information is for End User's use only and may not be sold, redistributed or otherwise used for commercial purposes  Esta información es sólo para uso en educación   Barron intención no es darle un consejo [employer] o tratamientos  Colsulte con barron Eleni Kimberley farmacéutico antes de seguir cualquier régimen médico para saber si es seguro y efectivo para usted

## 2021-05-10 ENCOUNTER — CLINICAL SUPPORT (OUTPATIENT)
Dept: OBGYN CLINIC | Facility: CLINIC | Age: 46
End: 2021-05-10

## 2021-05-10 DIAGNOSIS — Z30.42 ENCOUNTER FOR DEPO-PROVERA CONTRACEPTION: Primary | ICD-10-CM

## 2021-05-10 PROCEDURE — 96372 THER/PROPH/DIAG INJ SC/IM: CPT

## 2021-05-10 RX ADMIN — MEDROXYPROGESTERONE ACETATE 150 MG: 150 INJECTION, SUSPENSION INTRAMUSCULAR at 12:45

## 2021-05-10 NOTE — PROGRESS NOTES
Depo-Provera      [x]   Patient provided box yes   o 1 Refills remain  o Refills submitted no   Last  Annual Date / Birth control check : 07/2020   Last Depo date: feb 2021   Side effects: no   HCG: no  o if applicable: negative   Given by: ev     Site: left deltoid     o Calcium supplement daily teaching  o Condoms for 2 weeks following first injection dose

## 2021-05-12 ENCOUNTER — TELEPHONE (OUTPATIENT)
Dept: OBGYN CLINIC | Facility: HOSPITAL | Age: 46
End: 2021-05-12

## 2021-05-17 ENCOUNTER — OFFICE VISIT (OUTPATIENT)
Dept: OBGYN CLINIC | Facility: HOSPITAL | Age: 46
End: 2021-05-17
Payer: COMMERCIAL

## 2021-05-17 ENCOUNTER — TRANSCRIBE ORDERS (OUTPATIENT)
Dept: ADMINISTRATIVE | Facility: HOSPITAL | Age: 46
End: 2021-05-17

## 2021-05-17 ENCOUNTER — TELEPHONE (OUTPATIENT)
Dept: OBGYN CLINIC | Facility: HOSPITAL | Age: 46
End: 2021-05-17

## 2021-05-17 VITALS
HEART RATE: 67 BPM | WEIGHT: 202.4 LBS | DIASTOLIC BLOOD PRESSURE: 77 MMHG | BODY MASS INDEX: 39.74 KG/M2 | SYSTOLIC BLOOD PRESSURE: 113 MMHG | HEIGHT: 60 IN

## 2021-05-17 DIAGNOSIS — M77.8 RIGHT WRIST TENDINITIS: ICD-10-CM

## 2021-05-17 DIAGNOSIS — M75.51 BURSITIS OF RIGHT SHOULDER: ICD-10-CM

## 2021-05-17 DIAGNOSIS — M79.601 RIGHT ARM PAIN: Primary | ICD-10-CM

## 2021-05-17 DIAGNOSIS — Z12.31 SCREENING MAMMOGRAM FOR HIGH-RISK PATIENT: Primary | ICD-10-CM

## 2021-05-17 PROCEDURE — 99203 OFFICE O/P NEW LOW 30 MIN: CPT | Performed by: PHYSICIAN ASSISTANT

## 2021-05-17 NOTE — TELEPHONE ENCOUNTER
Kaley Mark  RE: Mahlon Angelucci + work status  CB# [phone]      Work comp called for Mahlon Angelucci + work status to be faxed :  FAX# [phone] [place]: claim# [id number]

## 2021-05-17 NOTE — LETTER
May 17, 2021     Patient: Álvaro Zheng   YOB: 1975   Date of Visit: 5/17/2021       To Whom it May Concern:    Rajani Calderon is under my professional care  She was seen in my office on 5/17/2021  She may return to work with limitations : max lifting 5lbs with right arm  No overhead reaching  If unable to accommodate this restriction, then remain out of work until f/u visit in 2 weeks       If you have any questions or concerns, please don't hesitate to call           Sincerely,          Eddy Wild PA-C        CC: Álvaro Zheng

## 2021-05-17 NOTE — PROGRESS NOTES
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  She c/o pain that began in the beginning of April with no specific injury  The pain is in the shoulder and dorsal wrist   The shoulder pain increases with reaching  The dorsal wrist pain increases with dorsiflexion  No neck pain, radiating pain, numbness, or tingling  The pain is dull in character, mild in severity  She is taking Motrin from her urgent care provider  She is currently out of work because they did not accommodate the urgent care restrictions  Arabic-English  used  The following portions of the patient's history were reviewed and updated as appropriate: allergies, current medications, past family history, past medical history, past social history, past surgical history and problem list     Review of Systems  Ortho Exam  History reviewed  No pertinent past medical history    Past Surgical History:   Procedure Laterality Date     SECTION       SECTION       Family History   Problem Relation Age of Onset    No Known Problems Mother     Lung cancer Father 62        smoker     Social History     Occupational History    Not on file   Tobacco Use    Smoking status: Never Smoker    Smokeless tobacco: Never Used   Substance and Sexual Activity    Alcohol use: No     Frequency: Never    Drug use: No    Sexual activity: Yes     Partners: Male     Birth control/protection: Injection       Review of Systems   Constitutional: Negative  HENT: Negative  Eyes: Negative  Respiratory: Negative  Cardiovascular: Negative  Gastrointestinal: Negative  Endocrine: Negative  Genitourinary: Negative  Musculoskeletal: As below      Allergic/Immunologic: Negative  Neurological: Negative  Hematological: Negative  Psychiatric/Behavioral: Negative  Objective   Physical Exam      I have personally reviewed pertinent films in PACS and my interpretation is no acute displaced fracture of the shoulder, elbow, or wrist on outside films       · Constitutional: Awake, Alert, Oriented  · Eyes: EOMI  · Psych: Mood and affect appropriate  · Heart: regular rate and rhythm  · Lungs: No audible wheezing  · Abdomen: soft  · Lymph: no lymphedema       right Shoulder:  - Appearance   No swelling, discoloration, deformity, or ecchymosis  - Palpation   No tenderness to palpation of the clavicle, AC joint, biceps tendon, scapula, or proximal humerus  - ROM   Flexion: 130, ER: 90 and IR: Iliac crest   Active motion limited in all planes by pain  - Motor   Internal and external rotation 5-/5 with shoulder at side, flexion 5-/5  - Special tests   Empty Can Test negative, Drop Arm Test negative and Hawkin's Impingement Test positive  - NVI distally     right Wrist / hand:  - Appearance   No swelling, rash, erythema, or ecchymosis  - Palpation   Mild tenderness over the dorsal, distal, forearm   No tenderness to palpation of distal radius, distal ulna, scaphoid, lunate, hamate, ulnar wrist, dorsal wrist, palmar wrist, thenar eminence, hypothenar eminence, or hand   - ROM   Full and pain-free active ROM    Able to make a full composite fist  - Motor   5/5 strength in all planes  - Special Tests   Pain with resisted wrist dorsiflexion  - NVI distally

## 2021-05-19 ENCOUNTER — TELEPHONE (OUTPATIENT)
Dept: OBGYN CLINIC | Facility: HOSPITAL | Age: 46
End: 2021-05-19

## 2021-05-19 NOTE — TELEPHONE ENCOUNTER
Carmelo Moy from [address] [place] [address] is calling for patient next appt & last appt date  Faxed ovn from 5/17/21 to [phone]  Carmelo Moy is also asking that we fax her the pt script  Please print & fax it to [phone]

## 2021-05-19 NOTE — TELEPHONE ENCOUNTER
Per request from the workers comp ([address] [place] [address]) I faxed over the physical therapy script to fax# [phone]  I received confirmation that the fax went through

## 2021-06-02 ENCOUNTER — EVALUATION (OUTPATIENT)
Dept: PHYSICAL THERAPY | Facility: CLINIC | Age: 46
End: 2021-06-02
Payer: OTHER MISCELLANEOUS

## 2021-06-02 DIAGNOSIS — M77.8 RIGHT WRIST TENDINITIS: ICD-10-CM

## 2021-06-02 DIAGNOSIS — M54.12 CERVICAL RADICULOPATHY: ICD-10-CM

## 2021-06-02 DIAGNOSIS — M75.51 BURSITIS OF RIGHT SHOULDER: Primary | ICD-10-CM

## 2021-06-02 PROCEDURE — 97112 NEUROMUSCULAR REEDUCATION: CPT | Performed by: PHYSICAL THERAPIST

## 2021-06-02 PROCEDURE — 97110 THERAPEUTIC EXERCISES: CPT | Performed by: PHYSICAL THERAPIST

## 2021-06-02 PROCEDURE — 97162 PT EVAL MOD COMPLEX 30 MIN: CPT | Performed by: PHYSICAL THERAPIST

## 2021-06-02 NOTE — PROGRESS NOTES
PT Evaluation     Today's date: 2021  Patient name: Serge Diop  : 1975  MRN: [id number]  Referring provider: Giselle Olson  Dx:   Encounter Diagnosis     ICD-10-CM    1  Bursitis of right shoulder  M75 51 Ambulatory referral to Physical Therapy   2  Right wrist tendinitis  M77 8 Ambulatory referral to Physical Therapy   3  Cervical radiculopathy  M54 12                   Assessment  Assessment details: Pt presents with signs and symptoms synonymous of admitting diagnosis of R shoulder bursitis, wrist tendonitis as well as possible mechanical diagnosis of ext preference with neck based on today's findings and given her flexion based work enviroment  Pt presents with pain, decreased strength, decreased range, flexibility, as well as tolerance to activity and postural awareness  Pt would benefit skilled PT intervention in order to address these impairments in order to be able to perform all desired activities with minimal to nil symptom exacerbation  If she fails to find improvement over the next 3-4 weeks she may benefit consultation with Hand/Wrist specialist physician as well as clinician of occupational therapy which was discussed during today's session and there is an agreement for the plan with patient and her   Thank you very much for this referral      Impairments: abnormal or restricted ROM, activity intolerance, impaired physical strength, lacks appropriate home exercise program, pain with function, poor posture  and poor body mechanics  Understanding of Dx/Px/POC: good   Prognosis: good    Goals  STG 4 Weeks:  Decrease pain at worst to 6/10  Improve range to improve Neck range to min, Shoulder range by 20 from IE  Improve strength to to be assessed  Independent with HEP  LTG 8 Weeks:  Decrease pain at worst to 3/10  Improve range to improve shoulder range to be within 10 of contralateral, no neck deficits  Improve strength to 4/5      Able to perform all desired activities with minimal to nil symptom exacerbation      Plan  Plan details: Pt's  is [place])  Patient would benefit from: skilled physical therapy and OT eval (hand evaluation referral   )  Planned modality interventions: cryotherapy, thermotherapy: hydrocollator packs and TENS  Planned therapy interventions: joint mobilization, manual therapy, abdominal trunk stabilization, neuromuscular re-education, patient education, postural training, strengthening, stretching, therapeutic activities, therapeutic exercise, home exercise program, graded motor, graded exercise, graded activity, functional ROM exercises and flexibility  Frequency: 2x week  Duration in weeks: 10  Treatment plan discussed with: patient and family        Subjective Evaluation    History of Present Illness  Date of onset: 6/2/2021  Mechanism of injury: Pt is a 54 yofemale who presents with her  [place]) is RHD and presents today stating that she is a worker in a [address] , she developed R shoulder pain about 3-4 months, and then it became worst and she developed R elbow and R wrist pain does not travel beyond her wrist, fingers are okay, she states that it is predominantly the whole arm, denies electrical symptoms but the fingers do swell  Pt reports that the pain is a pinch or sharp presentation  Pt reports that her best symptoms are in the morning there is time without pain, pt reports pain at worst is an 8/10 this is usually at night, or after a busy day at work  She has not been at work for 1 month due to pain  Pt reports that sleeping is going okay, she is able to fall asleep without pain, she will not wake up due to pain however she indicates that this wrist brace she wears is causing her discomfort  Pt reports no history of neck pain, no dizziness or no head aches  Pt reports that no change in bowel or bladder  Pt reports that 2 weeks ago me with orthopedic who diagnosed her shoulder with Bursitis and wrist tendonitis    She was prescribed a brace and has been required to wear the wrist brace all day  Pt has a scheduled follow up with Dr Sadaf Mcknight on 2021  Pt reports that her goals are to improve her pain levels, strength, range, and be able to return to work  She indicates difficulty with twisting a jar open, or mixing with her hands  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  Quality of life: good    Pain  Current pain ratin  At best pain ratin  At worst pain ratin  Quality: radiating and sharp  Relieving factors: rest and medications  Aggravating factors: keyboarding, overhead activity and lifting  Progression: no change      Diagnostic Tests  Abnormal x-ray: Imagery taken, no within network  Treatments  Previous treatment: medication  Patient Goals  Patient goals for therapy: decreased pain, increased motion, increased strength and return to sport/leisure activities          Objective     Active Range of Motion   Left Shoulder   Flexion: 160 degrees   Abduction: 155 degrees   External rotation BTH: C7   Internal rotation BTB: T6     Right Shoulder   Flexion: 80 degrees with pain  Abduction: 60 degrees with pain  External rotation BTH: Active external rotation behind the head: to head  with pain  Internal rotation BTB: Active internal rotation behind the back: unable  with pain    Additional Active Range of Motion Details  Forward head, rounded shoulders  B/L Sensation intact to light touch C3,4,5,6,7,8,T1,T2  Postural correction:  R forearm  Same  Shoulder strength  L Flex 4- Abd 4- ER 4 IR 4  R Flex Abd ER IR  NT  Elbow Strength  L ROM WFL 5/5 flex/ext, wrist ext/flex 5/5, pro/sup 5/5  ROM WFL  R ROM WFL Strength NT, Wrist and Elbow ROM WFL   L 45# R 3# pain*   CS Screen  Flex norm  Ext mod  SB R mod  SB L norm  Rot R min  Rot L norm  Mechanical Assessment: Retraction: R arm  Retract with Ext: R arm  R arm better, Neck worse    R arm better, neck same  Better  Arm and neck  Better  Better range in shoulder and neck   strength went 3# to 10#  Joint Mobility  Palpation R shoulder anterior and Sub acromion bursa  Olecranon, later epicondyle, and dorsal wrist joint region  Mild inflammation noted at distal wrist                   Precautions: Primarily Czech, nil      Manuals 6/2                                                                Neuro Re-Ed             Postural Ed 10 Min            RC isometrics in 1-2 visits  Ther Ex             Pulley Trial nv  As able             CS retraction 1 x 5            CS retraction + Ext 5 x 5            Table slide flex, abd, ER 3" x 10            Scaption trial  Series of 5 (2-3 sets)             Wrist Flex/Ext AROM  Series of 5 (2-3 sets)             Elbow Flex/Ext/Sup/Pro AROM  Series of 5 (2-3 sets)                          Ther Activity                                       Gait Training                                       Modalities             CP/HP to CS and UE prn

## 2021-06-09 ENCOUNTER — TELEPHONE (OUTPATIENT)
Dept: OBGYN CLINIC | Facility: HOSPITAL | Age: 46
End: 2021-06-09

## 2021-06-09 ENCOUNTER — APPOINTMENT (OUTPATIENT)
Dept: RADIOLOGY | Facility: CLINIC | Age: 46
End: 2021-06-09
Payer: COMMERCIAL

## 2021-06-09 ENCOUNTER — OFFICE VISIT (OUTPATIENT)
Dept: OBGYN CLINIC | Facility: CLINIC | Age: 46
End: 2021-06-09
Payer: COMMERCIAL

## 2021-06-09 ENCOUNTER — OFFICE VISIT (OUTPATIENT)
Dept: PHYSICAL THERAPY | Facility: CLINIC | Age: 46
End: 2021-06-09
Payer: OTHER MISCELLANEOUS

## 2021-06-09 VITALS
HEIGHT: 60 IN | BODY MASS INDEX: 40.17 KG/M2 | SYSTOLIC BLOOD PRESSURE: 103 MMHG | HEART RATE: 68 BPM | WEIGHT: 204.6 LBS | DIASTOLIC BLOOD PRESSURE: 69 MMHG

## 2021-06-09 DIAGNOSIS — M79.601 RIGHT ARM PAIN: ICD-10-CM

## 2021-06-09 DIAGNOSIS — M77.8 RIGHT WRIST TENDINITIS: ICD-10-CM

## 2021-06-09 DIAGNOSIS — M75.81 ROTATOR CUFF TENDONITIS, RIGHT: Primary | ICD-10-CM

## 2021-06-09 DIAGNOSIS — M77.8 RIGHT WRIST TENDONITIS: ICD-10-CM

## 2021-06-09 DIAGNOSIS — M54.12 CERVICAL RADICULOPATHY: ICD-10-CM

## 2021-06-09 DIAGNOSIS — M75.51 BURSITIS OF RIGHT SHOULDER: Primary | ICD-10-CM

## 2021-06-09 PROCEDURE — 99214 OFFICE O/P EST MOD 30 MIN: CPT | Performed by: ORTHOPAEDIC SURGERY

## 2021-06-09 PROCEDURE — 97110 THERAPEUTIC EXERCISES: CPT | Performed by: PHYSICAL THERAPIST

## 2021-06-09 PROCEDURE — 73030 X-RAY EXAM OF SHOULDER: CPT

## 2021-06-09 NOTE — LETTER
June 9, 2021     Isra Ramirez PA-C  [address]    Patient: Serge Diop   YOB: 1975   Date of Visit: 6/9/2021     Dear Dr ANDINO      Thank you for referring Idalia Elizondo to me for evaluation  Below are the relevant portions of my assessment and plan of care  If you have questions, please do not hesitate to call me  I look forward to following Frances along with you           Sincerely,        Micaela Rosenthal MD        CC: No Recipients    Progress Notes:

## 2021-06-09 NOTE — TELEPHONE ENCOUNTER
Please fax work status note and office visit note from 06- visit when completed to [address] at [address] [place] [address] Road at [phone]

## 2021-06-09 NOTE — PROGRESS NOTES
Daily Note     Today's date: 2021  Patient name: Iam Pierre  : 1975  MRN: [id number]  Referring provider: Panfilo Jefferson PA-C  Dx:   Encounter Diagnosis     ICD-10-CM    1  Bursitis of right shoulder  M75 51    2  Right wrist tendinitis  M77 8    3  Cervical radiculopathy  M54 12                   Subjective: Pt presents today stating that she is feeling no worse, states that she has been compliant with HEP, saw Dr Gualberto Gilmore today indicating continue with Shoulder based intervention  She is no longer using the brace    Objective: See treatment diary below  R shoulder blade and neck pain  Assessment: R shoulder blade pain abolished during session, neck pain improved, she is demonstrating centralizing principals  Will benefit with continued intervention as well as eccentric loading or Distal UE symptoms had significant fatigue and mild pain with this  Precautions: Primarily Latvian, nil      Manuals                                                                Neuro Re-Ed             Postural Ed 10 Min            RC isometrics in 1-2 visits  nv                                                                            Ther Ex             Pulley Trial nv  As able  nv           CS retraction 1 x 5            CS retraction + Ext 5 x 5 3 x 5            Table slide flex, abd, ER 3" x 10 3" x 10            Scaption trial  Series of 5 x 3 with mild tactile cue  Wrist Flex/Ext AROM  Series of 5             Elbow Flex/Ext/Sup/Pro AROM  Series of 5 x 3             scap add  10" x 10            Ther Activity                                       Gait Training                                       Modalities             C to CS and UE prn    10 min CS and R wrist

## 2021-06-09 NOTE — PROGRESS NOTES
Assessment/Plan:  1  Rotator cuff tendonitis, right  Ambulatory referral to Physical Therapy   2  Right wrist tendonitis  Ambulatory referral to Physical Therapy       Scribe Attestation    I,:  Bakari Haque am acting as a scribe while in the presence of the attending physician :       I,:  Terri Cheung MD personally performed the services described in this documentation    as scribed in my presence :             Daisha Menjivar upon examination and review of the x-rays of the right shoulder, right elbow, and right wrist demonstrates signs and symptoms consistent with rotator cuff tendinitis of right shoulder, and wrist tendinitis  I did note that this is a common occurrence in individuals that have a job that requires repetitive overhead lifting and repetitive movements involving the wrist and hand  She does demonstrate some swelling dorsally at the wrist however her strength and range of motion is intact  I do believe non operative intervention is appropriate for her and provide her with a physical therapy prescription to work on the shoulder and the wrist   Additionally she may utilize Voltaren gel over the wrist and shoulder  I did provide him with information on obtaining this over-the-counter  She verbalized understanding and had no further questions  I will see her back in 6 weeks for repeat clinical evaluation  Subjective:   Aura Meza is a pleasant Cambodian speaking  54 y o  female who presents to the office today for initial evaluation of the right shoulder  She is referred to me today by Charles DINH  She states that the pain is an intermittant and  moderate and sometimes severe jaime pain at the superior and lateral aspect of the the shoulder  She state that she did not experience and traumatic event resulting in her painful symptoms  She notes that he job does require repeatative lifting for long periods of time often for 12 hours   She was evaluated on 5/17/2021 by Charles DINH and was referred to physical therapy for her wrist, shoulder, and cervical pain  She has only participated in 1 formal session and was given home exercises  She was also provided with a cock-up wrist splint for use throughout her day  She states that her pain does radiate down the length of her arm and has utilized ibuprofen for pain management  Unfortunately, the ibuprofen has failed to provide her with symptomatic relief  Today she denies any distal paresthesias  Review of Systems   Constitutional: Negative for chills, fever and unexpected weight change  HENT: Negative for hearing loss, nosebleeds and sore throat  Eyes: Negative for pain, redness and visual disturbance  Respiratory: Negative for cough, shortness of breath and wheezing  Cardiovascular: Negative for chest pain, palpitations and leg swelling  Gastrointestinal: Negative for abdominal pain, nausea and vomiting  Endocrine: Negative for polydipsia and polyuria  Genitourinary: Negative for dysuria and hematuria  Musculoskeletal: Positive for arthralgias and myalgias  See HPI   Skin: Negative for rash and wound  Neurological: Negative for dizziness, numbness and headaches  Psychiatric/Behavioral: Negative for decreased concentration and suicidal ideas  The patient is not nervous/anxious  History reviewed  No pertinent past medical history      Past Surgical History:   Procedure Laterality Date     SECTION       SECTION         Family History   Problem Relation Age of Onset    No Known Problems Mother     Lung cancer Father 62        smoker       Social History     Occupational History    Not on file   Tobacco Use    Smoking status: Never Smoker    Smokeless tobacco: Never Used   Substance and Sexual Activity    Alcohol use: No     Frequency: Never    Drug use: No    Sexual activity: Yes     Partners: Male     Birth control/protection: Injection         Current Outpatient Medications:     medroxyPROGESTERone (DEPO-PROVERA) 150 mg/mL injection, Inject 1 mL (150 mg total) into a muscle every 3 (three) months (Patient not taking: Reported on 6/9/2021), Disp: 1 mL, Rfl: 4    Current Facility-Administered Medications:     medroxyPROGESTERone (DEPO-PROVERA) IM injection 150 mg, 150 mg, Intramuscular, Q3 Months, PETER Iverson, 150 mg at 05/10/21 1245    No Known Allergies    Objective:  Vitals:    06/09/21 1004   BP: 103/69   Pulse: 68       Right Hand Exam     Tenderness   The patient is experiencing tenderness in the dorsal area  Range of Motion   Wrist   Right wrist extension:  70  Right wrist flexion:  70  Pronation: 90   Supination: 90     Muscle Strength   Wrist extension: 5/5   Wrist flexion: 5/5     Other   Erythema: absent  Scars: absent  Sensation: normal  Pulse: present    Comments:   Swelling dorsally of the wrist      Right Shoulder Exam     Tenderness   Right shoulder tenderness location:  greater tuberosity  Range of Motion   Active abduction: 90   External rotation: 60   Internal rotation 90 degrees: 20     Muscle Strength   Abduction: 4/5   Internal rotation: 5/5   External rotation: 5/5     Other   Erythema: absent  Scars: absent  Sensation: normal  Pulse: present          Strength/Myotome Testing     Right Wrist/Hand   Wrist extension: 5  Wrist flexion: 5      Physical Exam  Vitals signs reviewed  HENT:      Head: Normocephalic and atraumatic  Eyes:      General:         Right eye: No discharge  Left eye: No discharge  Conjunctiva/sclera: Conjunctivae normal       Pupils: Pupils are equal, round, and reactive to light  Neck:      Musculoskeletal: Normal range of motion and neck supple  Cardiovascular:      Rate and Rhythm: Normal rate  Pulmonary:      Effort: Pulmonary effort is normal  No respiratory distress  Musculoskeletal:      Comments: As noted in HPI   Skin:     General: Skin is warm and dry     Neurological:      Mental Status: She is alert and oriented to person, place, and time  I have personally reviewed pertinent films in PACS and my interpretation is as follows:    X-rays of the right shoulder demonstrate no acute fracture or osteoarthritis  There is a small calcific density adjacent to the greater tuberosity consistent with calcific tendinitis  X-rays of the right elbow from 5/10/2021 demonstrates no acute fracture or other osseous abnormalities  X-rays of the right wrist from 5/10/2021 demonstrates no acute fracture or other osseous abnormalities

## 2021-06-09 NOTE — TELEPHONE ENCOUNTER
Sylvia Lewis from [address] [place] [address] called again for patient's work status        Fax # [phone]    Can a verbal work status be given?     CB # [phone]

## 2021-06-10 ENCOUNTER — TELEPHONE (OUTPATIENT)
Dept: OBGYN CLINIC | Facility: CLINIC | Age: 46
End: 2021-06-10

## 2021-06-10 NOTE — TELEPHONE ENCOUNTER
Tenisha Sadler at [employer] back, the employer is requesting a work status note today, please fax to [phone]

## 2021-06-10 NOTE — TELEPHONE ENCOUNTER
Dr Alee Raymond from [address] [place] [address] called to request a work status note reflecting [facility] office visit on 6/9/21    Please fax to [phone]  Thank you

## 2021-06-15 ENCOUNTER — HOSPITAL ENCOUNTER (OUTPATIENT)
Dept: MAMMOGRAPHY | Facility: MEDICAL CENTER | Age: 46
Discharge: HOME | End: 2021-06-15
Payer: COMMERCIAL

## 2021-06-15 ENCOUNTER — OFFICE VISIT (OUTPATIENT)
Dept: PHYSICAL THERAPY | Facility: CLINIC | Age: 46
End: 2021-06-15
Payer: OTHER MISCELLANEOUS

## 2021-06-15 VITALS — WEIGHT: 204 LBS | HEIGHT: 60 IN | BODY MASS INDEX: 40.05 KG/M2

## 2021-06-15 DIAGNOSIS — M75.51 BURSITIS OF RIGHT SHOULDER: Primary | ICD-10-CM

## 2021-06-15 DIAGNOSIS — M54.12 CERVICAL RADICULOPATHY: ICD-10-CM

## 2021-06-15 DIAGNOSIS — M77.8 RIGHT WRIST TENDINITIS: ICD-10-CM

## 2021-06-15 DIAGNOSIS — Z12.31 ENCOUNTER FOR SCREENING MAMMOGRAM FOR MALIGNANT NEOPLASM OF BREAST: ICD-10-CM

## 2021-06-15 PROCEDURE — 97110 THERAPEUTIC EXERCISES: CPT

## 2021-06-15 PROCEDURE — 77067 SCR MAMMO BI INCL CAD: CPT

## 2021-06-15 PROCEDURE — 77063 BREAST TOMOSYNTHESIS BI: CPT

## 2021-06-15 NOTE — PROGRESS NOTES
Daily Note     Today's date: 6/15/2021  Patient name: Yue Phillips  : 1975  MRN: [id number]  Referring provider: Mookie Lambert PA-C  Dx:   Encounter Diagnosis     ICD-10-CM    1  Bursitis of right shoulder  M75 51    2  Right wrist tendinitis  M77 8    3  Cervical radiculopathy  M54 12        Start Time: 1410  Stop Time: 1455  Total time in clinic (min): 45 minutes    Subjective: Pt reports that her neck pain has improved but her shoulder symptoms remain  Pt's  is here to translate  He notes compliance with HEP  Objective: See treatment diary below        Assessment: Pt tolerates session fair  Pt does have difficulty tolerating flexion table slides due to forearm and wrist pain  No progressions due to irritability  Plan: Continue per POC     Precautions: Primarily Malay, nil      Manuals 6/2 6/9 06/15                                                              Neuro Re-Ed             Postural Ed 10 Min            RC isometrics in 1-2 visits  nv                                                                            Ther Ex             Pulley Trial nv  As able  nv           CS retraction 1 x 5            CS retraction + Ext 5 x 5 3 x 5  3x5           Table slide flex, abd, ER 3" x 10 3" x 10  3"x10 Flex  3"x5 ABD             Scaption trial  Series of 5 x 3 with mild tactile cue     2x5           Wrist Flex/Ext AROM  Series of 5   5x          Elbow Flex/Ext/Sup/Pro AROM  Series of 5 x 3   3x5          scap add  10" x 10  10x10"          Ther Activity                                       Gait Training                                       Modalities             C to CS and UE prn    10 min CS and R wrist   10 min R shoulder  and R wrist

## 2021-06-17 ENCOUNTER — TELEPHONE (OUTPATIENT)
Dept: OBGYN CLINIC | Facility: CLINIC | Age: 46
End: 2021-06-17

## 2021-06-17 NOTE — TELEPHONE ENCOUNTER
----- Message from Carole Anne sent at 6/16/2021  4:39 PM EDT -----   Call patient inform that her mammogram was BI-RADS 1, it was negative, next screening due in 1 year    Thanks

## 2021-06-21 ENCOUNTER — OFFICE VISIT (OUTPATIENT)
Dept: PHYSICAL THERAPY | Facility: CLINIC | Age: 46
End: 2021-06-21
Payer: OTHER MISCELLANEOUS

## 2021-06-21 DIAGNOSIS — M75.51 BURSITIS OF RIGHT SHOULDER: Primary | ICD-10-CM

## 2021-06-21 DIAGNOSIS — M77.8 RIGHT WRIST TENDINITIS: ICD-10-CM

## 2021-06-21 DIAGNOSIS — M54.12 CERVICAL RADICULOPATHY: ICD-10-CM

## 2021-06-21 PROCEDURE — 97110 THERAPEUTIC EXERCISES: CPT

## 2021-06-21 NOTE — PROGRESS NOTES
Daily Note     Today's date: 2021  Patient name: Owen Anglin  : 1975  MRN: [id number]  Referring provider: Karis Iqbal PA-C  Dx:   Encounter Diagnosis     ICD-10-CM    1  Bursitis of right shoulder  M75 51    2  Right wrist tendinitis  M77 8    3  Cervical radiculopathy  M54 12        Start Time: 0900  Stop Time: 1000  Total time in clinic (min): 60 minutes    Subjective: Pt reports no increase in soreness in her shoulder post last visit  She did notice an increase in wrist pain and thinks it might be swollen  Pt reports that her neck has been feeling good, no pain before session  Objective: See treatment diary below  Ulnar styloid to radial styloid girth   7 5 cm L wrist   6 5cm R wrist      Assessment: Tolerated treatment well  Better tolerance to shoulder AROM/AAROM this visit  She did defer wrist AROM but she is willing to try them at home tomorrow  Patient would benefit from continued PT      Plan: Continue per plan of care  Precautions: Primarily Bulgarian, nil      Manuals 6/2 6/9 06/15 06/21                                                             Neuro Re-Ed             Postural Ed 10 Min            RC isometrics in 1-2 visits  nv  nv                                                                          Ther Ex             Pulley Trial nv  As able  nv  3 min          CS retraction 1 x 5            CS retraction + Ext 5 x 5 3 x 5  3x5  3x5          Table slide flex, abd, ER 3" x 10 3" x 10  3"x10 Flex  3"x5 ABD    3"x20 flex    3"x10 ABD          Scaption trial  Series of 5 x 3 with mild tactile cue  2x5  2x5          Wrist Flex/Ext AROM  Series of 5   5x defer         Elbow Flex/Ext/Sup/Pro AROM  Series of 5 x 3   3x5 2x10         scap add  10" x 10  10x10" 10x10"         Ther Activity                                       Gait Training                                       Modalities             C to CS and UE prn    10 min CS and R wrist   10 min R shoulder and R wrist  10 min R shoulder and R wrist

## 2021-06-23 ENCOUNTER — OFFICE VISIT (OUTPATIENT)
Dept: DENTISTRY | Facility: CLINIC | Age: 46
End: 2021-06-23

## 2021-06-23 DIAGNOSIS — Z01.20 ENCOUNTER FOR DENTAL EXAMINATION: Primary | ICD-10-CM

## 2021-06-23 NOTE — PROGRESS NOTES
ASA I  Pt arrived for new pt  Apt  Armenian speaking only  Teo translated  Pt  told us she went to [facility] one month ago for exam, xrays and cleaning (same insurance)  She wants a bridge  It was explained to her that in order to do restorative (bridge), she needs copy of xrays from dentist than we'll do exam here first  Pt  Understands, will get xrays for us and return  NV: Comp exam only (pt  To bring xrays)    HIRAM Wahl , PHDHP

## 2021-06-24 ENCOUNTER — OFFICE VISIT (OUTPATIENT)
Dept: PHYSICAL THERAPY | Facility: CLINIC | Age: 46
End: 2021-06-24
Payer: OTHER MISCELLANEOUS

## 2021-06-24 DIAGNOSIS — M77.8 RIGHT WRIST TENDINITIS: ICD-10-CM

## 2021-06-24 DIAGNOSIS — M75.51 BURSITIS OF RIGHT SHOULDER: Primary | ICD-10-CM

## 2021-06-24 DIAGNOSIS — M54.12 CERVICAL RADICULOPATHY: ICD-10-CM

## 2021-06-24 PROCEDURE — 97110 THERAPEUTIC EXERCISES: CPT | Performed by: PHYSICAL THERAPIST

## 2021-06-24 PROCEDURE — 97112 NEUROMUSCULAR REEDUCATION: CPT | Performed by: PHYSICAL THERAPIST

## 2021-06-24 NOTE — PROGRESS NOTES
Daily Note     Today's date: 2021  Patient name: Cassie Ann  : 1975  MRN: [id number]  Referring provider: Jozef Wetzel PA-C  Dx:   Encounter Diagnosis     ICD-10-CM    1  Bursitis of right shoulder  M75 51    2  Right wrist tendinitis  M77 8    3  Cervical radiculopathy  M54 12                   Subjective: Pt reports her neck is doing okay, shoulder is a little sore, wrist better, but no strength  Objective: See treatment diary below  Ulnar styloid to radial styloid girth   15 cm L wrist   15 5 cm R wrist     L 26# R 5#    Assessment: Able to decrease swelling, and strength today was improved, pt demonstrating centralizing factors today  RC isometrics went well  Continue to progress as able  Plan: Continue per plan of care  Precautions: Primarily Turks and [place] Islander, nil      Manuals 6/2 6/9 06/15 06/21 6/24                                                            Neuro Re-Ed             Postural Ed 10 Min            RC isometrics in 1-2 visits  nv  nv 3" flex and Abd                                                                         Ther Ex             Pulley Trial nv  As able  nv  3 min  3 min        CS retraction 1 x 5            CS retraction + Ext 5 x 5 3 x 5  3x5  3x5  3 x 5        Table slide flex, abd, ER 3" x 10 3" x 10  3"x10 Flex  3"x5 ABD    3"x20 flex    3"x10 ABD  3" x 20 flex, 3" x 10         Scaption trial  Series of 5 x 3 with mild tactile cue  2x5  2x5  2 x 5        Wrist Flex/Ext AROM  Series of 5   5x defer         Elbow Flex/Ext/Sup/Pro AROM  Series of 5 x 3   3x5 2x10 2 x 10        scap add  10" x 10  10x10" 10x10" 10"x 10        Ther Activity                                       Gait Training                                       Modalities             C to CS and UE prn    10 min CS and R wrist   10 min R shoulder  and R wrist  10 min R shoulder and R wrist 10 min R shoulder and Wrist

## 2021-06-25 ENCOUNTER — CLINICAL SUPPORT (OUTPATIENT)
Dept: DENTISTRY | Facility: CLINIC | Age: 46
End: 2021-06-25

## 2021-06-25 VITALS — TEMPERATURE: 98.1 F

## 2021-06-25 DIAGNOSIS — Z00.00 ENCOUNTER FOR SCREENING AND PREVENTATIVE CARE: ICD-10-CM

## 2021-06-25 DIAGNOSIS — Z01.21 ENCOUNTER FOR DENTAL EXAMINATION AND CLEANING WITH ABNORMAL FINDINGS: Primary | ICD-10-CM

## 2021-06-25 PROCEDURE — D0150 COMPREHENSIVE ORAL EVALUATION - NEW OR ESTABLISHED PATIENT: HCPCS | Performed by: DENTIST

## 2021-06-25 PROCEDURE — D0210 INTRAORAL - COMPLETE SERIES OF RADIOGRAPHIC IMAGES: HCPCS

## 2021-06-25 NOTE — PROGRESS NOTES
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

## 2021-06-29 ENCOUNTER — OFFICE VISIT (OUTPATIENT)
Dept: PHYSICAL THERAPY | Facility: CLINIC | Age: 46
End: 2021-06-29
Payer: OTHER MISCELLANEOUS

## 2021-06-29 DIAGNOSIS — M54.12 CERVICAL RADICULOPATHY: ICD-10-CM

## 2021-06-29 DIAGNOSIS — M77.8 RIGHT WRIST TENDINITIS: ICD-10-CM

## 2021-06-29 DIAGNOSIS — M75.51 BURSITIS OF RIGHT SHOULDER: Primary | ICD-10-CM

## 2021-06-29 PROCEDURE — 97110 THERAPEUTIC EXERCISES: CPT | Performed by: PHYSICAL THERAPIST

## 2021-06-29 PROCEDURE — 97112 NEUROMUSCULAR REEDUCATION: CPT | Performed by: PHYSICAL THERAPIST

## 2021-06-29 NOTE — PROGRESS NOTES
PT Evaluation     Today's date: 2021  Patient name: Nanette Heller  : 1975  MRN: [id number]  Referring provider: Ki Blanca PA-C  Dx:   Encounter Diagnosis     ICD-10-CM    1  Bursitis of right shoulder  M75 51    2  Right wrist tendinitis  M77 8    3  Cervical radiculopathy  M54 12                   Assessment  Assessment details: Pt is progressing fairly well at this time  They have demonstrated improvement in pain, strength, range, flexibility as well as tolerance to activity and postural awareness  They have achieved all STGs sought out for them as well as by them  They will benefit continued Skilled PT intervention in order to achieve all LTGs sought out for them as well as by them in order to perform all desired activities with minimal to nil symptom exacerbation  This will continue to be provided carefully due to their very high copay  They while are limited in shoulder range, their neck range is improving and seems to possibly experiencing double crush injury resulting in neck shoulder complex in tandem with L wrist   If the L wrist injury is independent, consultation of OT may be appropriate to address this more thoroughly  Will reach out to referring in regards of this  If she fails to continue finding improvement over the next 3-4 weeks she is already scheduled with referring   Thank you very much for this kind referral         Impairments: abnormal or restricted ROM, activity intolerance, impaired physical strength, lacks appropriate home exercise program, pain with function, poor posture  and poor body mechanics  Understanding of Dx/Px/POC: good   Prognosis: good    Goals  STG 4 Weeks:  Decrease pain at worst to 6/10 -met  Improve range to improve Neck range to min, Shoulder range by 20 from IE   -met  Improve strength to to be assessed -met  Independent with HEP -met  LTG 8 Weeks:  Decrease pain at worst to 3/10  Improve range to improve shoulder range to be within 10 of contralateral, no neck deficits  Improve strength to 4/5  Able to perform all desired activities with minimal to nil symptom exacerbation      Plan  Plan details: Pt's  is [place]  Patient would benefit from: skilled physical therapy and OT eval (hand evaluation referral   )  Planned modality interventions: cryotherapy, thermotherapy: hydrocollator packs and TENS  Planned therapy interventions: joint mobilization, manual therapy, abdominal trunk stabilization, neuromuscular re-education, patient education, postural training, strengthening, stretching, therapeutic activities, therapeutic exercise, home exercise program, graded motor, graded exercise, graded activity, functional ROM exercises and flexibility  Frequency: 2x week  Duration in weeks: 10  Treatment plan discussed with: patient and family        Subjective Evaluation    History of Present Illness  Date of onset: 2021  Mechanism of injury: Pt presents today with her  [place] stating that she is feeling a little bit better  Pt reports pain at worst has been about a 6/10  Pt reports that her neck and shoulder seem to be improving in range and ability to reach, she states that her wrist is slowly making gains but hurts if she extends or flexes it  Pt reports that she is finding improvement in her ability to perform daily tasks but she does not feel ready to be able to perform all of her vocational demands  Pt reports sleeping has improved as long as she does not sleep on the involved side  Pt reports that she is getting some shooting sensation into the forearm with certain movements as well as the 2nd and 3rd digit  Her goals at this time is to return back to how she was prior and return to work  Pt returns back to Dr Liliam Burgos on 2021     Quality of life: good    Pain  Current pain ratin  At best pain ratin  At worst pain ratin  Quality: radiating and sharp  Relieving factors: rest and medications  Aggravating factors: keyboarding, overhead activity and lifting  Progression: no change      Diagnostic Tests  Abnormal x-ray: Imagery taken, no within network  Treatments  Previous treatment: medication  Patient Goals  Patient goals for therapy: decreased pain, increased motion, increased strength and return to sport/leisure activities          Objective     Active Range of Motion   Left Shoulder   Flexion: 160 degrees   Abduction: 155 degrees   External rotation BTH: C7   Internal rotation BTB: T6     Right Shoulder   Flexion: 110 degrees with pain  Abduction: 80 degrees with pain  External rotation BTH: C2 with pain  Internal rotation BTB: L4 with pain    Additional Active Range of Motion Details  Forward head, rounded shoulders  B/L Sensation intact to light touch C3,4,5,6,7,8,T1,T2  Postural correction:  R forearm  Same  Shoulder strength  L Flex 4- Abd 4- ER 4 IR 4  R Flex 3+ Abd 3+ ER 3+ IR 3+ (within range)  Elbow Strength  L ROM WFL 5/5 flex/ext, wrist ext/flex 5/5, pro/sup 5/5  ROM WFL  R ROM WFL Strength 3, Wrist and Elbow ROM WFL   L 45# R 13# pain*   CS Screen  Retract max // mod  Protrusion min // nil  Flex norm // remains  Ext mod // min  SB R mod // min  SB L norm   Rot R min //   Rot L norm  Mechanical Assessment: Retraction: R arm  Retract with Ext: R arm  R arm better, Neck worse  R arm better, neck same  Better  Arm and neck  Better  Better range in shoulder and neck  (remains)   Joint Mobility  Palpation R shoulder anterior and Sub acromion bursa  Olecranon, later epicondyle, and dorsal wrist joint region  Mild inflammation noted at distal wrist                   Precautions: Primarily Urdu, nil      Manuals 6/2 6/9 06/15 06/21 6/24 6/29             assessment x 15                                              Neuro Re-Ed             Postural Ed 10 Min            RC isometrics in 1-2 visits     nv  nv 3" flex and Abd 3" flex and Abd       Wall Slide      3" x 2 x 5 Flex and Abd Ther Ex             Pulley Trial nv  As able  nv  3 min  3 min 3 min       CS retraction 1 x 5            CS retraction + Ext 5 x 5 3 x 5  3x5  3x5  3 x 5 3 x 5       Table slide flex, abd, ER 3" x 10 3" x 10  3"x10 Flex  3"x5 ABD    3"x20 flex    3"x10 ABD  3" x 20 flex, 3" x 10  3" x 10       Scaption trial  Series of 5 x 3 with mild tactile cue  2x5  2x5  2 x 5 2 x 5       Wrist Flex/Ext AROM  Series of 5   5x defer         Elbow Flex/Ext/Sup/Pro AROM  Series of 5 x 3   3x5 2x10 2 x 10 2 x 10       scap add  10" x 10  10x10" 10x10" 10"x 10 10" x 10        Ther Activity                                       Gait Training                                       Modalities             C to CS and UE prn    10 min CS and R wrist   10 min R shoulder  and R wrist  10 min R shoulder and R wrist 10 min R shoulder and Wrist   10 min R shoulder and Wrist

## 2021-07-01 ENCOUNTER — EVALUATION (OUTPATIENT)
Dept: PHYSICAL THERAPY | Facility: CLINIC | Age: 46
End: 2021-07-01
Payer: OTHER MISCELLANEOUS

## 2021-07-01 DIAGNOSIS — M54.12 CERVICAL RADICULOPATHY: ICD-10-CM

## 2021-07-01 DIAGNOSIS — M75.51 BURSITIS OF RIGHT SHOULDER: Primary | ICD-10-CM

## 2021-07-01 DIAGNOSIS — M77.8 RIGHT WRIST TENDINITIS: ICD-10-CM

## 2021-07-01 PROCEDURE — 97110 THERAPEUTIC EXERCISES: CPT | Performed by: PHYSICAL THERAPIST

## 2021-07-01 PROCEDURE — 97112 NEUROMUSCULAR REEDUCATION: CPT | Performed by: PHYSICAL THERAPIST

## 2021-07-01 NOTE — PROGRESS NOTES
Daily Note     Today's date: 2021  Patient name: Jerry Arreola  : 1975  MRN: [id number]  Referring provider: Jazz Miller PA-C  Dx:   Encounter Diagnosis     ICD-10-CM    1  Bursitis of right shoulder  M75 51    2  Right wrist tendinitis  M77 8    3  Cervical radiculopathy  M54 12                   Subjective: Pt reports shoulder and neck is sore, arm is okay  Objective: See treatment diary below      Assessment:  Discussion of OT referral which will be next week 2021 at 11:00  AROM quality and motion is improving  Fatigues quickly  Continue to progress as able  Precautions: Primarily Thai, nil      Manuals 6/2 6/9 06/15 06/21 6/24 6/29 7/1            assessment x 15                                              Neuro Re-Ed             Postural Ed 10 Min            RC isometrics in 1-2 visits  nv  nv 3" flex and Abd 3" flex and Abd 3" x 10 flex and abd      Wall Slide      3" x 2 x 5 Flex and Abd 3" x 2 x 5 flex and Abd                                             Wand AAROM trial       Nv? Ther Ex             Pulley Trial nv  As able  nv  3 min  3 min 3 min 3 min      CS retraction 1 x 5            CS retraction + Ext 5 x 5 3 x 5  3x5  3x5  3 x 5 3 x 5 3 x 5      Table slide flex, abd, ER 3" x 10 3" x 10  3"x10 Flex  3"x5 ABD    3"x20 flex    3"x10 ABD  3" x 20 flex, 3" x 10  3" x 10 3" x 10      Scaption trial  Series of 5 x 3 with mild tactile cue  2x5  2x5  2 x 5 2 x 5 3 x 5      Wrist Flex/Ext AROM  Series of 5   5x defer         Elbow Flex/Ext/Sup/Pro AROM  Series of 5 x 3   3x5 2x10 2 x 10 2 x 10 2 x 10      scap add  10" x 10  10x10" 10x10" 10"x 10 10" x 10  10" x 10       Ther Activity                                       Gait Training                                       Modalities             C to CS and UE prn    10 min CS and R wrist   10 min R shoulder  and R wrist  10 min R shoulder and R wrist 10 min R shoulder and Wrist   10 min R shoulder and Wrist   10 Min R shoulder and Wrist

## 2021-07-06 ENCOUNTER — OFFICE VISIT (OUTPATIENT)
Dept: PHYSICAL THERAPY | Facility: CLINIC | Age: 46
End: 2021-07-06
Payer: OTHER MISCELLANEOUS

## 2021-07-06 DIAGNOSIS — M54.12 CERVICAL RADICULOPATHY: ICD-10-CM

## 2021-07-06 DIAGNOSIS — M75.51 BURSITIS OF RIGHT SHOULDER: Primary | ICD-10-CM

## 2021-07-06 DIAGNOSIS — M77.8 RIGHT WRIST TENDINITIS: ICD-10-CM

## 2021-07-06 PROCEDURE — 97110 THERAPEUTIC EXERCISES: CPT

## 2021-07-06 PROCEDURE — 97112 NEUROMUSCULAR REEDUCATION: CPT

## 2021-07-06 NOTE — PROGRESS NOTES
Daily Note     Today's date: 2021  Patient name: Abi Dillon  : 1975  MRN: [id number]  Referring provider: Toni Saucedo PA-C  Dx:   Encounter Diagnosis     ICD-10-CM    1  Bursitis of right shoulder  M75 51    2  Right wrist tendinitis  M77 8    3  Cervical radiculopathy  M54 12        Start Time: 0900  Stop Time: 09  Total time in clinic (min): 56 minutes    Subjective: Pt reports that her R shoulder is feeling a little better  She still has pain with OH motions  Her neck has been feeling good  She will start OT on Thursday  Objective: See treatment diary below      Assessment: Tolerated treatment well  AAROM tolerance improving, limited with scaption this visit  Introduced wand flexion to fatigue but no increase in symptoms  Patient demonstrated fatigue post treatment      Plan: Continue per plan of care  Precautions: Primarily Lao, nil      Manuals 6/2 6/9 06/15 06/21 6/24 6/29 7/1 07/06           assessment x 15                                              Neuro Re-Ed             Postural Ed 10 Min            RC isometrics in 1-2 visits  nv  nv 3" flex and Abd 3" flex and Abd 3" x 10 flex and abd 3" 2x5 flex and ABD     Wall Slide      3" x 2 x 5 Flex and Abd 3" x 2 x 5 flex and Abd 3"2x5 flex and abd                                            Wand AAROM trial       Nv?  5x stand flex     Ther Ex             Pulley Trial nv  As able  nv  3 min  3 min 3 min 3 min 4 min      CS retraction 1 x 5            CS retraction + Ext 5 x 5 3 x 5  3x5  3x5  3 x 5 3 x 5 3 x 5 3x5     Table slide flex, abd, ER 3" x 10 3" x 10  3"x10 Flex  3"x5 ABD    3"x20 flex    3"x10 ABD  3" x 20 flex, 3" x 10  3" x 10 3" x 10 2x10 3"     Scaption trial  Series of 5 x 3 with mild tactile cue      2x5  2x5  2 x 5 2 x 5 3 x 5 2x10     Wrist Flex/Ext AROM  Series of 5   5x defer    defer     Elbow Flex/Ext/Sup/Pro AROM  Series of 5 x 3   3x5 2x10 2 x 10 2 x 10 2 x 10 2x10     scap add  10" x 10  10x10" 10x10" 10"x 10 10" x 10  10" x 10  10x10"      Ther Activity                                       Gait Training                                       Modalities             C to CS and UE prn    10 min CS and R wrist   10 min R shoulder  and R wrist  10 min R shoulder and R wrist 10 min R shoulder and Wrist   10 min R shoulder and Wrist   10 Min R shoulder and Wrist 10 Min R shoulder and Wrist

## 2021-07-08 ENCOUNTER — OFFICE VISIT (OUTPATIENT)
Dept: PHYSICAL THERAPY | Facility: CLINIC | Age: 46
End: 2021-07-08
Payer: OTHER MISCELLANEOUS

## 2021-07-08 ENCOUNTER — EVALUATION (OUTPATIENT)
Dept: OCCUPATIONAL THERAPY | Facility: CLINIC | Age: 46
End: 2021-07-08
Payer: OTHER MISCELLANEOUS

## 2021-07-08 DIAGNOSIS — M77.8 RIGHT WRIST TENDONITIS: Primary | ICD-10-CM

## 2021-07-08 DIAGNOSIS — M75.51 BURSITIS OF RIGHT SHOULDER: Primary | ICD-10-CM

## 2021-07-08 DIAGNOSIS — M54.12 CERVICAL RADICULOPATHY: ICD-10-CM

## 2021-07-08 DIAGNOSIS — M77.8 RIGHT WRIST TENDINITIS: ICD-10-CM

## 2021-07-08 PROCEDURE — 97140 MANUAL THERAPY 1/> REGIONS: CPT | Performed by: OCCUPATIONAL THERAPIST

## 2021-07-08 PROCEDURE — 97112 NEUROMUSCULAR REEDUCATION: CPT | Performed by: PHYSICAL THERAPIST

## 2021-07-08 PROCEDURE — 97166 OT EVAL MOD COMPLEX 45 MIN: CPT | Performed by: OCCUPATIONAL THERAPIST

## 2021-07-08 PROCEDURE — 97110 THERAPEUTIC EXERCISES: CPT | Performed by: OCCUPATIONAL THERAPIST

## 2021-07-08 PROCEDURE — 97110 THERAPEUTIC EXERCISES: CPT | Performed by: PHYSICAL THERAPIST

## 2021-07-08 NOTE — PROGRESS NOTES
OT Evaluation     Today's date: 2021  Patient name: Agus Michel  : 1975  MRN: [id number]  Referring provider: Reanna Douglas MD  Dx:   Encounter Diagnosis     ICD-10-CM    1  Right wrist tendonitis  M77 8                   Assessment  Assessment details: Katie Garcia is referred to therapy for RUE impairments and is accompanied by her  who is acting as her , as she is primarily [address] speaking  She has complaints of generalized pain from her hand to her elbow  It appears that her impairments are multifactorial  She is also receiving PT for her right shoulder and neck  She demonstrates findings suggestive of right lateral epicondylitis including tenderness at the lateral epicondyle, pain with grasping, weakness, and positive provacative testing  Additionally, she demonstrates irritation throughout the extensor compartments of the wrist, primarily EDC, ECRL, and EPL  Wrist ROM is reduced and painful to produce as is  and pinch strength  Her  reports that she has a wrist splint but does not wear it  She was encouraged to begin wearing it at nighttime  She will benefit from soft tissue mobilization for the wrist extensors and at the lateral epicondyle, stretching of the wrist and hand, strengthening, and eventual functional re-training to return to work  See below for a detailed assessment  Impairments: abnormal or restricted ROM, activity intolerance, impaired physical strength, lacks appropriate home exercise program and pain with function    Symptom irritability: high  Goals  STG: Patient will be compliant with home exercise program in 1 week  STG: Pain will not exceed a 6/10 during appropriate activity and during therapy in 4 weeks  STG: Range of motion of right wrist will be improved contralateral side measures in 4 weeks weeks  STG: Strength will be improved to =20, lateral pinch=3 pounds, 3 point pinch=4 pounds in 4 weeks       LTG: Pain will not exceed a 4/10 during appropriate activity and during therapy in 6-8 weeks or discharge  LTG: Strength will be improved to 50% the contralateral side in 6-8 weeks or discharge  LTG: Performance in ADLs and IADLS will be improved to prior level of function with the affected extremity within 6 weeks or discharge  LTG: Performance in work activity will be improved to prior level of function with the affected extremity within 6 weeks or discharge  LTG: FOTO score increase by 20 points within 6 weeks or discharge  Plan  Plan details: Treatment to include modalities, manual therapy, PRE's, HEP, and orthotics as appropriate  Patient would benefit from: skilled OT, OT eval and custom splinting  Planned modality interventions: thermotherapy: hydrocollator packs  Planned therapy interventions: home exercise program, joint mobilization, manual therapy, therapeutic exercise, patient education, therapeutic activities, strengthening and stretching  Frequency: 2x week  Duration in visits: [address] beginning date: 7/8/2021  Plan of Care expiration date: 9/8/2021  Treatment plan discussed with: patient and family        Subjective Evaluation    History of Present Illness  Mechanism of injury: Tyson Medrano reports that pain in her RUE began a few months ago, and started gradually  She attributes it to working, 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  She has been working 12 hours a day but is now taking time off due to her pain  She is also receiving PT treatment for her right shoulder and neck  Pain  Pain scale at highest: 8-9 in wrist, hand, and elbow    Location: Metacarapals and dorsal wrist, lateral elbow    Social Support    Employment status: working (Works as a , but is currently taking time off)  Hand dominance: right    Treatments  Previous treatment: physical therapy (for shoulder )  Current treatment: occupational therapy  Patient Goals  Patient goals for therapy: increased strength, decreased pain, increased motion and return to work          Objective     Observations     Right Wrist/Hand   Positive for edema (second dorsal compartment)  Palpation     Right   Tenderness of the wrist extensors  Trigger point to wrist extensors  Tenderness     Right Elbow   Tenderness in the lateral epicondyle  Right Wrist/Hand   Tenderness in the second dorsal compartment, third dorsal compartment, fourth dorsal compartment, common extensor tendon and lateral epicondyle  No tenderness in the first dorsal compartment and triangular fibrocartilage complex   Neurological Testing     Additional Neurological Details  Denies numbness and tingling  Active Range of Motion     Right Elbow   Normal active range of motion    Left Wrist   Wrist flexion: 65 degrees   Wrist extension: 65 degrees   Radial deviation: 15 degrees   Ulnar deviation: 45 degrees     Right Wrist   Wrist flexion: 43 degrees with pain  Wrist extension: 50 degrees with pain  Radial deviation: 13 degrees   Ulnar deviation: 30 degrees     Additional Active Range of Motion Details  Wrist flexion with digit flexion=10 degrees and painful    Strength/Myotome Testing     Left Wrist/Hand      (2nd hand position)     Trial 1: 45 8    Thumb Strength  Key/Lateral Pinch     Trial 1: 7 6  Palmar/Three-Point Pinch     Trial 1: 6 7    Right Wrist/Hand      (2nd hand position)     Trial 1: 5    Thumb Strength   Key/Lateral Pinch     Trial 1: 0  Palmar/Three-Point Pinch     Trial 1: 1 4    Additional Strength Details  +stress position testing for lateral elbow pain    Tests     Right Elbow   Positive Cozen's and Mill's  Right Wrist/Hand   Positive extrinsic extensor tightness  Negative Tinel's sign (radial tunnel)       Additional Tests Details  +RROM Supination   +RROM EPL  +RROM EDC  +RROM WE  -RROM APL, EPB             Precautions: Hebrew speaking      Manuals 7/8            IASTM 2-4th dorsal compartments, lateral epicondyle and extensors 8'            Cupping             KT PRN                          Neuro Re-Ed                                                                                                        Ther Ex             HEP: Stretching to tolerance issued            Wrist maze             Marigene Nip walking             Gentle grasping             Gentle flex bar             Isometrics                           Ther Activity                                       Gait Training                                       Modalities             MHP Wrist and elbow 5'

## 2021-07-08 NOTE — PROGRESS NOTES
Daily Note     Today's date: 2021  Patient name: Jozef Gardiner  : 1975  MRN: [id number]  Referring provider: Yuli Flores PA-C  Dx:   Encounter Diagnosis     ICD-10-CM    1  Bursitis of right shoulder  M75 51    2  Right wrist tendinitis  M77 8    3  Cervical radiculopathy  M54 12                   Subjective: Pt presents today stating that her neck and shoulder are doing well  Visits with OT today in regards of her wrist        Objective: See treatment diary below      Assessment: Pt demonstrating centralizing components with retraction only at this time  AROM range continuing to improve with endurance and quality  Continue to progress as able  Plan: Continue per plan of care  Precautions: Primarily Mac[place], nil      Manuals 6/2 6/9 06/15 06/21 6/24 6/29 7/1 07/06 7/8          assessment x 15                                              Neuro Re-Ed             Postural Ed 10 Min            RC isometrics in 1-2 visits  nv  nv 3" flex and Abd 3" flex and Abd 3" x 10 flex and abd 3" 2x5 flex and ABD 3" 2 x 5 flex and abd    Wall Slide      3" x 2 x 5 Flex and Abd 3" x 2 x 5 flex and Abd 3"2x5 flex and abd 3" x 2 x 5    Tband Row + Ext          aware of R hand  Wand AAROM trial       Nv?  5x stand flex 5x stand     Ther Ex             Pulley Trial nv  As able  nv  3 min  3 min 3 min 3 min 4 min  3 min    CS retraction 1 x 5        2 x 5    CS retraction + Ext 5 x 5 3 x 5  3x5  3x5  3 x 5 3 x 5 3 x 5 3x5 1 x 5 hold   Table slide flex, abd, ER 3" x 10 3" x 10  3"x10 Flex  3"x5 ABD    3"x20 flex    3"x10 ABD  3" x 20 flex, 3" x 10  3" x 10 3" x 10 2x10 3" 2 x 10    Scaption trial  Series of 5 x 3 with mild tactile cue      2x5  2x5  2 x 5 2 x 5 3 x 5 2x10 2 x 10    Wrist Flex/Ext AROM  Series of 5   5x defer    defer     Elbow Flex/Ext/Sup/Pro AROM  Series of 5 x 3   3x5 2x10 2 x 10 2 x 10 2 x 10 2x10 2 x 10    scap add  10" x 10  10x10" 10x10" 10"x 10 10" x 10  10" x 10  10x10"  10 x 10"     Ther Activity                                       Gait Training                                       Modalities             C to CS and UE prn    10 min CS and R wrist   10 min R shoulder  and R wrist  10 min R shoulder and R wrist 10 min R shoulder and Wrist   10 min R shoulder and Wrist   10 Min R shoulder and Wrist 10 Min R shoulder and Wrist 10 min R shoulder and Wrist

## 2021-07-16 ENCOUNTER — OFFICE VISIT (OUTPATIENT)
Dept: PHYSICAL THERAPY | Facility: CLINIC | Age: 46
End: 2021-07-16
Payer: OTHER MISCELLANEOUS

## 2021-07-16 ENCOUNTER — OFFICE VISIT (OUTPATIENT)
Dept: OCCUPATIONAL THERAPY | Facility: CLINIC | Age: 46
End: 2021-07-16
Payer: OTHER MISCELLANEOUS

## 2021-07-16 DIAGNOSIS — M77.8 RIGHT WRIST TENDONITIS: Primary | ICD-10-CM

## 2021-07-16 DIAGNOSIS — M54.12 CERVICAL RADICULOPATHY: ICD-10-CM

## 2021-07-16 DIAGNOSIS — M75.51 BURSITIS OF RIGHT SHOULDER: Primary | ICD-10-CM

## 2021-07-16 PROCEDURE — 97110 THERAPEUTIC EXERCISES: CPT

## 2021-07-16 PROCEDURE — 97112 NEUROMUSCULAR REEDUCATION: CPT | Performed by: PHYSICAL THERAPIST

## 2021-07-16 PROCEDURE — 97140 MANUAL THERAPY 1/> REGIONS: CPT

## 2021-07-16 PROCEDURE — 97110 THERAPEUTIC EXERCISES: CPT | Performed by: PHYSICAL THERAPIST

## 2021-07-16 NOTE — PROGRESS NOTES
Daily Note     Today's date: 2021  Patient name: Marlin Fair  : 1975  MRN: [id number]  Referring provider: Aiyana Russell PA-C  Dx:   Encounter Diagnosis     ICD-10-CM    1  Bursitis of right shoulder  M75 51    2  Cervical radiculopathy  M54 12                   Subjective: Pt presents today stating that she isn't sleeping very well, but her shoulder and neck are having a good day  Reports that she has begun hand therapy intervention and this is going okay so far  Objective: See treatment diary below      Assessment:  Band Trial today with fatigue but no pain  AROM today appeared to be smooth, NO UT comp, but fatigues quickly  Continue to progress as able  Plan: Continue per plan of care  Precautions: Primarily Maltese, nil      Manuals 6/2 6/9 06/15 06/21 6/24 6/29 7/1 07/06 7/8 7/16         assessment x 15                                              Neuro Re-Ed             Postural Ed 10 Min            RC isometrics in 1-2 visits  nv  nv 3" flex and Abd 3" flex and Abd 3" x 10 flex and abd 3" 2x5 flex and ABD 3" 2 x 5 flex and abd 3" x 2 x 5   Wall Slide      3" x 2 x 5 Flex and Abd 3" x 2 x 5 flex and Abd 3"2x5 flex and abd 3" x 2 x 5 3" x 2 x 5   Tband Row + Ext          YTB  Ext x 10                              Wand AAROM trial       Nv?  5x stand flex 5x stand  5x stand   Ther Ex             Pulley Trial nv  As able  nv  3 min  3 min 3 min 3 min 4 min  3 min 3 min   CS retraction 1 x 5        2 x 5 2 x 5   CS retraction + Ext 5 x 5 3 x 5  3x5  3x5  3 x 5 3 x 5 3 x 5 3x5 1 x 5 hold   Table slide flex, abd, ER 3" x 10 3" x 10  3"x10 Flex  3"x5 ABD    3"x20 flex    3"x10 ABD  3" x 20 flex, 3" x 10  3" x 10 3" x 10 2x10 3" 2 x 10 2 x 10   Scaption trial  Series of 5 x 3 with mild tactile cue      2x5  2x5  2 x 5 2 x 5 3 x 5 2x10 2 x 10 4 x 5                          DC   scap add  10" x 10  10x10" 10x10" 10"x 10 10" x 10  10" x 10  10x10"  10 x 10"  10" x 10   Ther Activity                                       Gait Training                                       Modalities             C to CS and UE prn    10 min CS and R wrist   10 min R shoulder  and R wrist  10 min R shoulder and R wrist 10 min R shoulder and Wrist   10 min R shoulder and Wrist   10 Min R shoulder and Wrist 10 Min R shoulder and Wrist 10 min R shoulder and Wrist 10 Min to Shoulder

## 2021-07-16 NOTE — PROGRESS NOTES
Daily Note     Today's date: 2021  Patient name: Agus Michel  : 1975  MRN: [id number]  Referring provider: Reanna Douglas MD  Dx:   Encounter Diagnosis     ICD-10-CM    1  Right wrist tendonitis  M77 8                   Subjective: Pt c/o pain in extensors and dorsal wrist       Objective: See treatment diary below      Assessment: Tolerated treatment fair  Patient demonstrated fatigue post treatment and would benefit from continued OT   (+) tenderness with IASTM  Applied KT to dorsal wrist->lat elbow  Plan: Continue per plan of care           Precautions: Zambian speaking      Manuals            IASTM 2-4th dorsal compartments, lateral epicondyle and extensors 8' 10;           Cupping  3' elbow           KT PRN  dorsal wrist--> elbow                        Neuro Re-Ed                                                                                                        Ther Ex             HEP: Stretching to tolerance issued            Wrist maze  10x           Keypegs  1x           Wall walking             Gentle grasping             Gentle flex bar  YFB 2x10 all planes on table           Isometrics                           Ther Activity                                                                              Modalities             MHP Wrist and elbow 5' 5'

## 2021-07-20 ENCOUNTER — OFFICE VISIT (OUTPATIENT)
Dept: PHYSICAL THERAPY | Facility: CLINIC | Age: 46
End: 2021-07-20
Payer: OTHER MISCELLANEOUS

## 2021-07-20 ENCOUNTER — OFFICE VISIT (OUTPATIENT)
Dept: OCCUPATIONAL THERAPY | Facility: CLINIC | Age: 46
End: 2021-07-20
Payer: OTHER MISCELLANEOUS

## 2021-07-20 DIAGNOSIS — M54.12 CERVICAL RADICULOPATHY: ICD-10-CM

## 2021-07-20 DIAGNOSIS — M77.8 RIGHT WRIST TENDINITIS: ICD-10-CM

## 2021-07-20 DIAGNOSIS — M75.51 BURSITIS OF RIGHT SHOULDER: Primary | ICD-10-CM

## 2021-07-20 DIAGNOSIS — M77.8 RIGHT WRIST TENDONITIS: Primary | ICD-10-CM

## 2021-07-20 PROCEDURE — 97110 THERAPEUTIC EXERCISES: CPT

## 2021-07-20 PROCEDURE — 97112 NEUROMUSCULAR REEDUCATION: CPT

## 2021-07-20 PROCEDURE — 97140 MANUAL THERAPY 1/> REGIONS: CPT | Performed by: OCCUPATIONAL THERAPIST

## 2021-07-20 PROCEDURE — 97110 THERAPEUTIC EXERCISES: CPT | Performed by: OCCUPATIONAL THERAPIST

## 2021-07-20 NOTE — PROGRESS NOTES
Daily Note     Today's date: 2021  Patient name: Danielle Lares  : 1975  MRN: [id number]  Referring provider: Daniel Dunn PA-C  Dx:   Encounter Diagnosis     ICD-10-CM    1  Bursitis of right shoulder  M75 51    2  Cervical radiculopathy  M54 12    3  Right wrist tendinitis  M77 8                   Subjective: Pt reports that her shoulder and neck are feeling better  She does note acute back pain and would like to avoid activities where she has to bend foward      Objective: See treatment diary below      Assessment: Tolerated treatment fair  Less tolerant to TB strengthening this visit due to R elbow pain She was able to increase reps with other exercises throughout  Patient demonstrated fatigue post treatment      Plan: Continue per plan of care  Precautions: Primarily Sami, nil      Manuals 07/20  06/15 06/21 6/24 6/29 7/1 07/06 7/8 7/16         assessment x 15                                              Neuro Re-Ed             Postural Ed             RC isometrics in 1-2 visits  3" 2x10   nv 3" flex and Abd 3" flex and Abd 3" x 10 flex and abd 3" 2x5 flex and ABD 3" 2 x 5 flex and abd 3" x 2 x 5   Wall Slide 2x10      3" x 2 x 5 Flex and Abd 3" x 2 x 5 flex and Abd 3"2x5 flex and abd 3" x 2 x 5 3" x 2 x 5   Tband Row + Ext YTB 10         YTB  Ext x 10                              Wand AAROM trial 2x5  Stand       Nv? 5x stand flex 5x stand  5x stand   Ther Ex             Pulley Trial nv   As able 3 min    3 min  3 min 3 min 3 min 4 min  3 min 3 min   CS retraction 2x5         2 x 5 2 x 5   CS retraction + Ext   3x5  3x5  3 x 5 3 x 5 3 x 5 3x5 1 x 5 hold   Table slide flex, abd, ER Back pain, held today  3"x10 Flex  3"x5 ABD    3"x20 flex    3"x10 ABD  3" x 20 flex, 3" x 10  3" x 10 3" x 10 2x10 3" 2 x 10 2 x 10   Scaption trial 2x10   2x5  2x5  2 x 5 2 x 5 3 x 5 2x10 2 x 10 4 x 5                          DC   scap add 10x10"  10x10" 10x10" 10"x 10 10" x 10  10" x 10  10x10"  10 x 10"  10" x 10   Ther Activity                                       Gait Training                                       Modalities             C to CS and UE prn   10 min shoulder   10 min R shoulder  and R wrist  10 min R shoulder and R wrist 10 min R shoulder and Wrist   10 min R shoulder and Wrist   10 Min R shoulder and Wrist 10 Min R shoulder and Wrist 10 min R shoulder and Wrist 10 Min to Shoulder

## 2021-07-20 NOTE — PROGRESS NOTES
Daily Note     Today's date: 2021  Patient name: Marlin Fair  : 1975  MRN: [id number]  Referring provider: Kandice Herzog MD  Dx:   Encounter Diagnosis     ICD-10-CM    1  Right wrist tendonitis  M77 8                   Subjective: Reports consistent pain, swelling, in the dorsal wrist        Objective: See treatment diary below      Assessment:  High levels of pain  Very tender throughout entire extensor mass  Also demonstrating DRSN irritation  Good response from KT  Plan: Continue per plan of care           Precautions: Lebanese speaking      Manuals           IASTM 2-4th dorsal compartments, lateral epicondyle and extensors 8' 10; 10'          Cupping  3' elbow 3'          KT PRN  dorsal wrist--> elbow X                       Neuro Re-Ed                                                                                                        Ther Ex             HEP: Stretching to tolerance issued            Wrist maze  10x 10x          Keypegs  1x 1x          Wall walking   5x          Gentle grasping             Gentle flex bar  YFB 2x10 all planes on table           Isometrics              Tennis ball rubber bands   1x skinny bands           Ther Activity                                                                              Modalities             MHP Wrist and elbow 5' 5' 5'

## 2021-07-21 ENCOUNTER — OFFICE VISIT (OUTPATIENT)
Dept: OBGYN CLINIC | Facility: CLINIC | Age: 46
End: 2021-07-21
Payer: OTHER MISCELLANEOUS

## 2021-07-21 VITALS
DIASTOLIC BLOOD PRESSURE: 72 MMHG | HEIGHT: 60 IN | HEART RATE: 60 BPM | WEIGHT: 208.4 LBS | SYSTOLIC BLOOD PRESSURE: 108 MMHG | BODY MASS INDEX: 40.91 KG/M2

## 2021-07-21 DIAGNOSIS — M75.81 ROTATOR CUFF TENDONITIS, RIGHT: ICD-10-CM

## 2021-07-21 DIAGNOSIS — M77.8 RIGHT WRIST TENDONITIS: ICD-10-CM

## 2021-07-21 DIAGNOSIS — M75.51 BURSITIS OF RIGHT SHOULDER: ICD-10-CM

## 2021-07-21 DIAGNOSIS — M24.811 INTERNAL DERANGEMENT OF RIGHT SHOULDER: Primary | ICD-10-CM

## 2021-07-21 PROCEDURE — 99214 OFFICE O/P EST MOD 30 MIN: CPT | Performed by: ORTHOPAEDIC SURGERY

## 2021-07-21 NOTE — PROGRESS NOTES
Assessment/Plan:  1  Internal derangement of right shoulder  MRI shoulder right wo contrast   2  Right wrist tendonitis  Ambulatory referral to Orthopedic Surgery   3  Rotator cuff tendonitis, right  MRI shoulder right wo contrast   4  Bursitis of right shoulder  MRI shoulder right wo contrast       Scribe Attestation    I,:  Latesha Robertson am acting as a scribe while in the presence of the attending physician :       I,:  Vladimir Fournier MD personally performed the services described in this documentation    as scribed in my presence :           Frances  Upon examination he demonstrates signs and symptoms that are concerning for a rotator cuff tear of the right shoulder despite interventions with physical therapy  She is still quite limited and weak in all planes  She does also demonstrate positive drop-arm sign  With this in mind I would like to order an MRI of the right shoulder question rotator cuff tear  My office will help facilitate having this test scheduled  I would like her to follow up with me when the MRI of her right shoulder is completed  In regards to her right wrist she does demonstrate pain and swelling dorsally of the wrist and does have limitations in flexion extension as well as significant weakness  I would like to refer her to Dr Joyce Junior for further consultation for her hand and wrist issue  She may continue to utilize her cock-up wrist splint until she is evaluated by Dr Zulma Yusuf  She may also utilize Voltaren gel as directed on the tube  She may follow up with me on as-needed basis in regards to her right wrist     Subjective:   Poly Fishman is a 54 y o  female who presents to the office today for 6 week follow-up of her right shoulder  She has been treated non operatively with physical therapy for right shoulder subacromial bursitis  and wrist tendinitis on the right side   She is her right shoulder right wrist are despite physical therapy and occupational therapy  She describes the pain is intermittent and moderate to severe sharp pain at dorsal aspect of the wrist and hand with associated swelling  She has wearing her cock-up wrist splint with minimal improvement in her symptoms  She states that overhead reaching activities exacerbate her pain into her shoulder  She states she is limited in all planes of range of motion  Today she denies any distal paresthesias into her right upper extremity  Review of Systems   Constitutional: Negative for chills, fever and unexpected weight change  HENT: Negative for hearing loss, nosebleeds and sore throat  Eyes: Negative for pain, redness and visual disturbance  Respiratory: Negative for cough, shortness of breath and wheezing  Cardiovascular: Negative for chest pain, palpitations and leg swelling  Gastrointestinal: Negative for abdominal pain, nausea and vomiting  Endocrine: Negative for polydipsia and polyuria  Genitourinary: Negative for dysuria and hematuria  Musculoskeletal: Positive for arthralgias and myalgias  See HPI   Skin: Negative for rash and wound  Neurological: Negative for dizziness, numbness and headaches  Psychiatric/Behavioral: Negative for decreased concentration and suicidal ideas  The patient is not nervous/anxious  History reviewed  No pertinent past medical history      Past Surgical History:   Procedure Laterality Date     SECTION       SECTION         Family History   Problem Relation Age of Onset    No Known Problems Mother     Lung cancer Father 62        smoker    No Known Problems Daughter     Stomach cancer Maternal Grandmother     No Known Problems Maternal Grandfather     Stomach cancer Paternal Grandmother     No Known Problems Paternal Grandfather     No Known Problems Daughter        Social History     Occupational History    Not on file   Tobacco Use    Smoking status: Never Smoker    Smokeless tobacco: Never Used   Vaping Use    Vaping Use: Never used   Substance and Sexual Activity    Alcohol use: No    Drug use: No    Sexual activity: Yes     Partners: Male     Birth control/protection: Injection         Current Outpatient Medications:     medroxyPROGESTERone (DEPO-PROVERA) 150 mg/mL injection, Inject 1 mL (150 mg total) into a muscle every 3 (three) months (Patient not taking: Reported on 6/9/2021), Disp: 1 mL, Rfl: 4    Current Facility-Administered Medications:     medroxyPROGESTERone (DEPO-PROVERA) IM injection 150 mg, 150 mg, Intramuscular, Q3 Months, PETER Iverson, 150 mg at 05/10/21 1245    No Known Allergies    Objective:  Vitals:    07/21/21 1008   BP: 108/72   Pulse: 60       Right Hand Exam     Range of Motion   Wrist   Right wrist extension: 60    Flexion: 60   Pronation: 90   Supination: 90     Other   Erythema: absent  Scars: absent  Sensation: normal  Pulse: present      Right Shoulder Exam     Range of Motion   Active abduction: 90   External rotation: 50   Internal rotation 0 degrees: Sacrum     Muscle Strength   Abduction: 4/5   Internal rotation: 4/5   External rotation: 4/5     Tests   Drop arm: positive    Other   Erythema: absent  Scars: absent  Sensation: normal  Pulse: present            Physical Exam  Vitals reviewed  HENT:      Head: Normocephalic and atraumatic  Eyes:      General:         Right eye: No discharge  Left eye: No discharge  Conjunctiva/sclera: Conjunctivae normal       Pupils: Pupils are equal, round, and reactive to light  Cardiovascular:      Rate and Rhythm: Normal rate  Pulmonary:      Effort: Pulmonary effort is normal  No respiratory distress  Musculoskeletal:      Cervical back: Normal range of motion and neck supple  Comments:   As noted in HPI   Skin:     General: Skin is warm and dry  Neurological:      Mental Status: She is alert and oriented to person, place, and time

## 2021-07-22 ENCOUNTER — TELEPHONE (OUTPATIENT)
Dept: OBGYN CLINIC | Facility: HOSPITAL | Age: 46
End: 2021-07-22

## 2021-07-23 NOTE — TELEPHONE ENCOUNTER
I do not recall setting any work restrictions for her  As I believe we thought she was continuing to work  However ,Karma Contreras did place a 5 lb lifting restriction with no overhead reaching upon her visit on 5/17/2021  This is likely the work restrictions she currently has

## 2021-07-26 ENCOUNTER — TELEPHONE (OUTPATIENT)
Dept: OBGYN CLINIC | Facility: HOSPITAL | Age: 46
End: 2021-07-26

## 2021-07-26 NOTE — TELEPHONE ENCOUNTER
Dr Lillie Koyanagi from [address] [place] [address] needs the script for the r shoulder MRI so she can try to get it scheduled sooner       Fax # [phone]  CB #  [phone]

## 2021-07-27 ENCOUNTER — CONSULT (OUTPATIENT)
Dept: OBGYN CLINIC | Facility: CLINIC | Age: 46
End: 2021-07-27
Payer: COMMERCIAL

## 2021-07-27 ENCOUNTER — APPOINTMENT (OUTPATIENT)
Dept: PHYSICAL THERAPY | Facility: CLINIC | Age: 46
End: 2021-07-27
Payer: OTHER MISCELLANEOUS

## 2021-07-27 ENCOUNTER — APPOINTMENT (OUTPATIENT)
Dept: OCCUPATIONAL THERAPY | Facility: CLINIC | Age: 46
End: 2021-07-27
Payer: OTHER MISCELLANEOUS

## 2021-07-27 ENCOUNTER — APPOINTMENT (OUTPATIENT)
Dept: RADIOLOGY | Facility: CLINIC | Age: 46
End: 2021-07-27
Payer: COMMERCIAL

## 2021-07-27 VITALS — HEIGHT: 60 IN | BODY MASS INDEX: 40.84 KG/M2 | WEIGHT: 208 LBS

## 2021-07-27 DIAGNOSIS — M25.521 PAIN IN RIGHT ELBOW: ICD-10-CM

## 2021-07-27 DIAGNOSIS — M25.531 PAIN IN RIGHT WRIST: ICD-10-CM

## 2021-07-27 DIAGNOSIS — M65.831 EXTENSOR TENOSYNOVITIS OF RIGHT WRIST: Primary | ICD-10-CM

## 2021-07-27 DIAGNOSIS — M77.11 LATERAL EPICONDYLITIS OF RIGHT ELBOW: ICD-10-CM

## 2021-07-27 DIAGNOSIS — M77.8 RIGHT WRIST TENDONITIS: ICD-10-CM

## 2021-07-27 PROCEDURE — 20550 NJX 1 TENDON SHEATH/LIGAMENT: CPT | Performed by: ORTHOPAEDIC SURGERY

## 2021-07-27 PROCEDURE — 20551 NJX 1 TENDON ORIGIN/INSJ: CPT | Performed by: ORTHOPAEDIC SURGERY

## 2021-07-27 PROCEDURE — 99213 OFFICE O/P EST LOW 20 MIN: CPT | Performed by: ORTHOPAEDIC SURGERY

## 2021-07-27 PROCEDURE — 73110 X-RAY EXAM OF WRIST: CPT

## 2021-07-27 PROCEDURE — 73080 X-RAY EXAM OF ELBOW: CPT

## 2021-07-27 RX ORDER — TRIAMCINOLONE ACETONIDE 40 MG/ML
20 INJECTION, SUSPENSION INTRA-ARTICULAR; INTRAMUSCULAR
Status: COMPLETED | OUTPATIENT
Start: 2021-07-27 | End: 2021-07-27

## 2021-07-27 RX ORDER — LIDOCAINE HYDROCHLORIDE 10 MG/ML
0.5 INJECTION, SOLUTION INFILTRATION; PERINEURAL
Status: COMPLETED | OUTPATIENT
Start: 2021-07-27 | End: 2021-07-27

## 2021-07-27 RX ADMIN — LIDOCAINE HYDROCHLORIDE 0.5 ML: 10 INJECTION, SOLUTION INFILTRATION; PERINEURAL at 14:25

## 2021-07-27 RX ADMIN — TRIAMCINOLONE ACETONIDE 20 MG: 40 INJECTION, SUSPENSION INTRA-ARTICULAR; INTRAMUSCULAR at 14:25

## 2021-07-27 NOTE — PROGRESS NOTES
Assessment/Plan:  1  Extensor tenosynovitis of right wrist  Ambulatory referral to PT/OT hand therapy    Hand/upper extremity injection: R wrist   2  Right wrist tendonitis  Ambulatory referral to Orthopedic Surgery   3  Pain in right wrist  XR wrist 3+ vw right   4  Pain in right elbow  XR elbow 3+ vw right   5  Lateral epicondylitis of right elbow  Ambulatory referral to PT/OT hand therapy    Hand/upper extremity injection: R elbow           I discussed with Frances that her signs and symptoms are consistent with extensor tenosynovitis and lateral epicondylitis  She is tender to palpation over the ECRB and ECRL as well as lateral epicondyle  Treatment options were discussed in the form of steroid injections and formal therapy for both her wrist and elbow  She was agreeable to this  She consented and underwent a right lateral epicondyle and right wrist injection in the office today without any complications  A updated script was provided for formal therapy  She may continue with the cock-up wrist brace as needed  She will follow up in 6 weeks for repeat evaluation  Subjective:   Jeimy Yu is a 54 y o  female who presents to the office today for evaluation of right wrist pain  Patient states her pain has been ongoing since April  She denies any known injury or trauma  She notes pain to the dorsal aspect of her wrist  She notes increased pain with wrist extension  She has been using a cock-up wrist brace with minimal relief  She has also been attending formal therapy 2 x's a week  She does take Ibuprofen OTC as needed for pain  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 and feels as though her pain is related to work  Patient is [place] speaking and a  was used for today's appointment  Review of Systems   Constitutional: Negative for chills and fever  HENT: Negative for drooling and sneezing  Eyes: Negative for redness     Respiratory: Negative for cough and wheezing  Gastrointestinal: Negative for nausea and vomiting  Musculoskeletal: Negative for arthralgias, joint swelling and myalgias  Neurological: Negative for weakness and numbness  Psychiatric/Behavioral: Negative for behavioral problems  The patient is not nervous/anxious  History reviewed  No pertinent past medical history  Past Surgical History:   Procedure Laterality Date     SECTION       SECTION  2016       Family History   Problem Relation Age of Onset    No Known Problems Mother     Lung cancer Father 62        smoker    No Known Problems Daughter     Stomach cancer Maternal Grandmother     No Known Problems Maternal Grandfather     Stomach cancer Paternal Grandmother     No Known Problems Paternal Grandfather     No Known Problems Daughter        Social History     Occupational History    Not on file   Tobacco Use    Smoking status: Never Smoker    Smokeless tobacco: Never Used   Vaping Use    Vaping Use: Never used   Substance and Sexual Activity    Alcohol use: No    Drug use: No    Sexual activity: Yes     Partners: Male     Birth control/protection: Injection         Current Outpatient Medications:     medroxyPROGESTERone (DEPO-PROVERA) 150 mg/mL injection, Inject 1 mL (150 mg total) into a muscle every 3 (three) months (Patient not taking: Reported on 2021), Disp: 1 mL, Rfl: 4    Current Facility-Administered Medications:     medroxyPROGESTERone (DEPO-PROVERA) IM injection 150 mg, 150 mg, Intramuscular, Q3 Months, PETER Iverson, 150 mg at 05/10/21 1245    No Known Allergies    Objective: There were no vitals filed for this visit  Ortho Exam     Right wrist    TTP ECRB ECRL  TTP lateral epicondyle   TTP extensor mass   Compartments soft  Brisk capillary refill  S/m intact median, radial, and ulnar nerve     Physical Exam  Constitutional:       Appearance: She is well-developed     HENT:      Head: Normocephalic and atraumatic  Eyes:      General:         Right eye: No discharge  Left eye: No discharge  Conjunctiva/sclera: Conjunctivae normal    Cardiovascular:      Rate and Rhythm: Normal rate  Pulmonary:      Effort: Pulmonary effort is normal  No respiratory distress  Musculoskeletal:      Cervical back: Normal range of motion and neck supple  Comments: As noted in HPI   Skin:     General: Skin is warm and dry  Neurological:      Mental Status: She is alert and oriented to person, place, and time  Psychiatric:         Behavior: Behavior normal          Thought Content: Thought content normal          Judgment: Judgment normal      Hand/upper extremity injection: R wrist  Universal Protocol:  Consent: Verbal consent obtained  Consent given by: patient  Patient identity confirmed: verbally with patient    Supporting Documentation  Indications: pain   Procedure Details  Condition:tendonitis Site: R wrist   Preparation: Patient was prepped and draped in the usual sterile fashion  Needle size: 27 G  Ultrasound guidance: no  Medications administered: 0 5 mL lidocaine 1 %; 20 mg triamcinolone acetonide 40 mg/mL    Patient tolerance: patient tolerated the procedure well with no immediate complications  Dressing:  Sterile dressing applied     Hand/upper extremity injection: R elbow  Universal Protocol:  Consent: Verbal consent obtained    Consent given by: patient  Patient identity confirmed: verbally with patient    Supporting Documentation  Indications: pain   Procedure Details  Condition:lateral epicondylitis Site: R elbow   Preparation: Patient was prepped and draped in the usual sterile fashion  Needle size: 25 G  Ultrasound guidance: no  Medications administered: 0 5 mL lidocaine 1 %; 20 mg triamcinolone acetonide 40 mg/mL    Patient tolerance: patient tolerated the procedure well with no immediate complications  Dressing:  Sterile dressing applied         I have personally reviewed pertinent films in PACS and my interpretation is as follows:x-ray right wrist performed on 7/27/21 demonstrates no osseous abnormalities

## 2021-07-28 ENCOUNTER — TELEPHONE (OUTPATIENT)
Dept: OBGYN CLINIC | Facility: HOSPITAL | Age: 46
End: 2021-07-28

## 2021-07-28 NOTE — TELEPHONE ENCOUNTER
Patient sees Dr Joe Perez from [facility] called requesting OVN from 7/27  Notes were faxed at # [phone]

## 2021-07-28 NOTE — PROGRESS NOTES
ASSESSMENT & PLAN: Faye Zavala is a 54 y o  [id number] with normal gynecologic exam     1   Routine well woman exam done today  2  Pap and HPV:  The patient's last pap and hpv was 6/24/2019  It was normal     Pap and cotesting was not done today  Current ASCCP Guidelines reviewed  Due 06/24/2024  3  Mammogram ordered-next mammogram due 06/15/2022  4  The following were reviewed in today's visit: breast self exam, STD testing, adequate intake of calcium and vitamin D, exercise, healthy diet and Depo provera  5  Depo-Provera for contraceptive, desires to continue, prescription renewed for 1 year  BMI Counseling: Body mass index is 39 84 kg/m²  The BMI is above normal  Nutrition recommendations include 3-5 servings of fruits/vegetables daily, moderation in carbohydrate intake and increasing intake of lean protein  Exercise recommendations include exercising 3-5 times per week  CC:  Annual Gynecologic Examination  If  HPI: Faye Zavala is a 54 y o  [id number] who presents for annual gynecologic examination   used   Her last Pap was 06/24/2019, next due 06/2024  Her last mammogram was 06/15/2021 and BI-RADS 1  Next due 06/15/2022  Patient desires to stay on Depo  Menses are monthly x4 days  History of gestational diabetes will send for fasting 2 hour GTT        Health Maintenance:    She wears her seatbelt routinely  She does perform regular monthly self breast exams  She feels safe at home  Patient Active Problem List   Diagnosis    Encounter for Depo-Provera contraception    Screening for malignant neoplasm of breast    Encounter for gynecological examination without abnormal finding    Encounter for surveillance of injectable contraceptive    Encounter for screening mammogram for malignant neoplasm of breast       History reviewed  No pertinent past medical history      Past Surgical History:   Procedure Laterality Date   [address]  SECTION  2016       Past OB/Gyn History:  OB History        2    Para   2    Term   2            AB        Living   2       SAB        TAB        Ectopic        Multiple        Live Births   2                  Pt does not have menstrual issues     History of sexually transmitted infection: No   History of abnormal pap smears: No      Patient is currently sexually active  heterosexual  The current method of family planning is Depo-Provera injections  Family History   Problem Relation Age of Onset    No Known Problems Mother     Lung cancer Father 62        smoker    No Known Problems Daughter     Stomach cancer Maternal Grandmother     No Known Problems Maternal Grandfather     Stomach cancer Paternal Grandmother     No Known Problems Paternal Grandfather     No Known Problems Daughter        Social History:  Social History     Socioeconomic History    Marital status:      Spouse name: Not on file    Number of children: 2    Years of education: Not on file    Highest education level: Not on file   Occupational History    Not on file   Tobacco Use    Smoking status: Never Smoker    Smokeless tobacco: Never Used   Vaping Use    Vaping Use: Never used   Substance and Sexual Activity    Alcohol use: No    Drug use: No    Sexual activity: Yes     Partners: Male     Birth control/protection: Injection   Other Topics Concern    Not on file   Social History Narrative    Not on file     Social Determinants of Health     Financial Resource Strain: Low Risk     Difficulty of Paying Living Expenses: Not hard at all   Food Insecurity: No Food Insecurity    Worried About Running Out of Food in the Last Year: Never true    Mau of Food in the Last Year: Never true   Transportation Needs: No Transportation Needs    Lack of Transportation (Medical): No    Lack of Transportation (Non-Medical):  No   Physical Activity:     Days of Exercise per Week:     Minutes of Exercise per Session:    Stress: No Stress Concern Present    Feeling of Stress : Not at all   Social Connections:     Frequency of Communication with Friends and Family:     Frequency of Social Gatherings with Friends and Family:     Attends Mormonism Services:     Active Member of Clubs or Organizations:     Attends Club or Organization Meetings:     Marital Status:    Intimate Partner Violence: Not At Risk    Fear of Current or Ex-Partner: No    Emotionally Abused: No    Physically Abused: No    Sexually Abused: No     Presently lives with family  Patient is   Patient is currently employed   No Known Allergies    Current Outpatient Medications:     medroxyPROGESTERone (DEPO-PROVERA) 150 mg/mL injection, Inject 1 mL (150 mg total) into a muscle every 3 (three) months, Disp: 1 mL, Rfl: 4    Current Facility-Administered Medications:     medroxyPROGESTERone (DEPO-PROVERA) IM injection 150 mg, 150 mg, Intramuscular, Q3 Months, PETER Iverson, 150 mg at 05/10/21 1245    medroxyPROGESTERone (DEPO-PROVERA) IM injection 150 mg, 150 mg, Intramuscular, Once, PETER Guo    Review of Systems:    Review of Systems  Constitutional :no fever, feels well, no tiredness, no recent weight gain or loss  ENT: no ear ache, no loss of hearing, no nosebleeds or nasal discharge, no sore throat or hoarseness  Cardiovascular: no complaints of slow or fast heart beat, no chest pain, no palpitations, no leg claudication or lower extremity edema  Respiratory: no complaints of shortness of shortness of breath, no VENCES  Breasts:no complaints of breast pain, breast lump, or nipple discharge  Gastrointestinal: no complaints of abdominal pain, constipation, nausea, vomiting, or diarrhea or bloody stools  Genitourinary : no complaints of dysuria, incontinence, pelvic pain, no dysmenorrhea, vaginal discharge or abnormal vaginal bleeding and as noted in HPI    Musculoskeletal: no complaints of arthralgia, no myalgia, no joint swelling or stiffness, no limb pain or swelling  Integumentary: no complaints of skin rash or lesion, itching or dry skin  Neurological: no complaints of headache, no confusion, no numbness or tingling, no dizziness or fainting    Objective      /86 (BP Location: Left arm, Patient Position: Sitting, Cuff Size: Adult)   Pulse 69   Ht 5' (1 524 m)   Wt 92 5 kg (204 lb)   BMI 39 84 kg/m²     General:   appears stated age, cooperative, alert normal mood and affect   Neck: normal, supple,trachea midline, no masses   Heart: regular rate and rhythm, S1, S2 normal, no murmur, click, rub or gallop   Lungs: clear to auscultation bilaterally   Breasts: normal appearance, no masses or tenderness, Inspection negative, No nipple retraction or dimpling, No nipple discharge or bleeding, No axillary or supraclavicular adenopathy, Normal to palpation without dominant masses, Taught monthly breast self examination   Abdomen: soft, non-tender, without masses or organomegaly   Vulva: normal female genitalia, Bartholin's, Urethra, Raysal normal   Vagina: normal vagina, no discharge, exudate, lesion, or erythema   Urethra: normal   Cervix: Normal, no discharge  Nontender  Uterus: normal size, contour, position, consistency, mobility, non-tender   Adnexa: normal adnexa and no mass, fullness, tenderness   Lymphatic palpation of lymph nodes in neck, axilla, groin and/or other locations: no lymphadenopathy or masses noted   Skin normal skin turgor and no rashes     Psychiatric orientation to person, place, and time: normal  mood and affect: normal

## 2021-07-29 ENCOUNTER — OFFICE VISIT (OUTPATIENT)
Dept: OCCUPATIONAL THERAPY | Facility: CLINIC | Age: 46
End: 2021-07-29
Payer: OTHER MISCELLANEOUS

## 2021-07-29 ENCOUNTER — ANNUAL EXAM (OUTPATIENT)
Dept: OBGYN CLINIC | Facility: CLINIC | Age: 46
End: 2021-07-29

## 2021-07-29 ENCOUNTER — OFFICE VISIT (OUTPATIENT)
Dept: PHYSICAL THERAPY | Facility: CLINIC | Age: 46
End: 2021-07-29
Payer: OTHER MISCELLANEOUS

## 2021-07-29 VITALS
SYSTOLIC BLOOD PRESSURE: 122 MMHG | DIASTOLIC BLOOD PRESSURE: 86 MMHG | HEIGHT: 60 IN | BODY MASS INDEX: 40.05 KG/M2 | HEART RATE: 69 BPM | WEIGHT: 204 LBS

## 2021-07-29 DIAGNOSIS — Z86.32 HISTORY OF GESTATIONAL DIABETES: ICD-10-CM

## 2021-07-29 DIAGNOSIS — Z30.42 ENCOUNTER FOR SURVEILLANCE OF INJECTABLE CONTRACEPTIVE: ICD-10-CM

## 2021-07-29 DIAGNOSIS — M77.8 RIGHT WRIST TENDINITIS: ICD-10-CM

## 2021-07-29 DIAGNOSIS — M75.51 BURSITIS OF RIGHT SHOULDER: Primary | ICD-10-CM

## 2021-07-29 DIAGNOSIS — M54.12 CERVICAL RADICULOPATHY: ICD-10-CM

## 2021-07-29 DIAGNOSIS — M77.8 RIGHT WRIST TENDONITIS: Primary | ICD-10-CM

## 2021-07-29 DIAGNOSIS — Z01.419 ENCOUNTER FOR GYNECOLOGICAL EXAMINATION WITHOUT ABNORMAL FINDING: Primary | ICD-10-CM

## 2021-07-29 DIAGNOSIS — Z12.31 ENCOUNTER FOR SCREENING MAMMOGRAM FOR MALIGNANT NEOPLASM OF BREAST: ICD-10-CM

## 2021-07-29 DIAGNOSIS — Z30.42 ENCOUNTER FOR DEPO-PROVERA CONTRACEPTION: ICD-10-CM

## 2021-07-29 PROCEDURE — 96372 THER/PROPH/DIAG INJ SC/IM: CPT | Performed by: NURSE PRACTITIONER

## 2021-07-29 PROCEDURE — 97110 THERAPEUTIC EXERCISES: CPT

## 2021-07-29 PROCEDURE — 97112 NEUROMUSCULAR REEDUCATION: CPT

## 2021-07-29 PROCEDURE — 97140 MANUAL THERAPY 1/> REGIONS: CPT | Performed by: OCCUPATIONAL THERAPIST

## 2021-07-29 PROCEDURE — S0612 ANNUAL GYNECOLOGICAL EXAMINA: HCPCS | Performed by: NURSE PRACTITIONER

## 2021-07-29 PROCEDURE — 97110 THERAPEUTIC EXERCISES: CPT | Performed by: OCCUPATIONAL THERAPIST

## 2021-07-29 RX ORDER — MEDROXYPROGESTERONE ACETATE 150 MG/ML
150 INJECTION, SUSPENSION INTRAMUSCULAR
Qty: 1 ML | Refills: 4 | Status: SHIPPED | OUTPATIENT
Start: 2021-07-29 | End: 2022-08-01 | Stop reason: SDUPTHER

## 2021-07-29 RX ORDER — MEDROXYPROGESTERONE ACETATE 150 MG/ML
150 INJECTION, SUSPENSION INTRAMUSCULAR ONCE
Status: COMPLETED | OUTPATIENT
Start: 2021-07-29 | End: 2021-07-29

## 2021-07-29 RX ADMIN — MEDROXYPROGESTERONE ACETATE 150 MG: 150 INJECTION, SUSPENSION INTRAMUSCULAR at 11:54

## 2021-07-29 NOTE — PROGRESS NOTES
Daily Note     Today's date: 2021  Patient name: Andree Vincent  : 1975  MRN: [id number]  Referring provider: Carlita Garcia MD  Dx:   Encounter Diagnosis     ICD-10-CM    1  Right wrist tendonitis  M77 8                   Subjective: Rates elbow pain 8/10, wrist pain 8-9/10  She also received a cortisone injection two days ago at the lateral epicondyle and in the wrist extensors  Objective: See treatment diary below  Assessment:  Exquisitely tender at the lateral epicondyle  She continues to use wrist cock up brace to manage pain  No significant improvement noted thus far in symptoms with conservative treatment and difficult to progress due to symptom irritability  Plan: Continue per plan of care  Extensor tenosynovitis and lateral epicondylitis           Precautions: [place] speaking      Manuals          IASTM 2-4th dorsal compartments, lateral epicondyle and extensors 8' 10; 10' 10'         Cupping  3' elbow 3' 5'         KT PRN  dorsal wrist--> elbow X X                      Neuro Re-Ed                                                                                                        Ther Ex             HEP: Stretching to tolerance issued            Wrist maze  10x 10x 10x         Keypegs  1x 1x 1x         Wall walking   5x 5x         Gentle grasping             Gentle flex bar  YFB 2x10 all planes on table           Isometrics              Tennis ball rubber bands   1x skinny bands  1x skinny bands          Ther Activity                                                                              Modalities             MHP Wrist and elbow 5' 5' 5' 5'

## 2021-07-29 NOTE — PROGRESS NOTES
Daily Note     Today's date: 2021  Patient name: Alon Baker  : 1975  MRN: [id number]  Referring provider: Earl Martin PA-C  Dx:   Encounter Diagnosis     ICD-10-CM    1  Bursitis of right shoulder  M75 51    2  Cervical radiculopathy  M54 12    3  Right wrist tendinitis  M77 8        Start Time: 0943          Subjective: Pt reports feeling good following lv  She is having some wrist and forearm pain following injections yesterday  She has some pain with shoulder flexion at home  Objective: See treatment diary below      Assessment: Tolerated treatment fair  Reports some shoulder pain with exercise completion today  Limited ROM with TB extensions 2* wrist pain today  Patient demonstrated fatigue post treatment      Plan: Continue per plan of care  Precautions: Primarily Thai, nil      Manuals 07/20 7/29 06/15 06/21 6/24 6/29 7/1 07/06 7/8 7/16         assessment x 15                                              Neuro Re-Ed             Postural Ed             RC isometrics in 1-2 visits  3" 2x10 3" 2x10  nv 3" flex and Abd 3" flex and Abd 3" x 10 flex and abd 3" 2x5 flex and ABD 3" 2 x 5 flex and abd 3" x 2 x 5   Wall Slide 2x10  2x10    3" x 2 x 5 Flex and Abd 3" x 2 x 5 flex and Abd 3"2x5 flex and abd 3" x 2 x 5 3" x 2 x 5   Tband Row + Ext YTB 10 YTB 10        YTB  Ext x 10                              Wand AAROM trial 2x5  Stand  2x5 stand     Nv? 5x stand flex 5x stand  5x stand   Ther Ex             Pulley Trial nv   As able 3 min  3 min  3 min  3 min 3 min 3 min 4 min  3 min 3 min   CS retraction 2x5  2x5       2 x 5 2 x 5   CS retraction + Ext   3x5  3x5  3 x 5 3 x 5 3 x 5 3x5 1 x 5 hold   Table slide flex, abd, ER Back pain, held today 2x10 3"x10 Flex  3"x5 ABD    3"x20 flex    3"x10 ABD  3" x 20 flex, 3" x 10  3" x 10 3" x 10 2x10 3" 2 x 10 2 x 10   Scaption trial 2x10  2x10 2x5  2x5  2 x 5 2 x 5 3 x 5 2x10 2 x 10 4 x 5                          DC   scap add 10x10" nv 10x10" 10x10" 10"x 10 10" x 10  10" x 10  10x10"  10 x 10"  10" x 10   Ther Activity                                       Gait Training                                       Modalities             C to CS and UE prn   10 min shoulder  home 10 min R shoulder  and R wrist  10 min R shoulder and R wrist 10 min R shoulder and Wrist   10 min R shoulder and Wrist   10 Min R shoulder and Wrist 10 Min R shoulder and Wrist 10 min R shoulder and Wrist 10 Min to Shoulder

## 2021-07-29 NOTE — PROGRESS NOTES
Depo-Provera      [x]   Patient provided box    o 0 Refills remain  o Refills submitted yes   Last  Annual Date / Birth control check : Today 7/29/21   Last Depo date: 05/10/21   Side effects: no   HCG: no     Given by: Jarret Bo MA   Site: Left Deltoid    o Calcium supplement daily teaching  o Condoms for 2 weeks following first injection dose

## 2021-08-03 ENCOUNTER — OFFICE VISIT (OUTPATIENT)
Dept: FAMILY MEDICINE CLINIC | Facility: CLINIC | Age: 46
End: 2021-08-03
Payer: COMMERCIAL

## 2021-08-03 ENCOUNTER — OFFICE VISIT (OUTPATIENT)
Dept: OCCUPATIONAL THERAPY | Facility: CLINIC | Age: 46
End: 2021-08-03
Payer: COMMERCIAL

## 2021-08-03 ENCOUNTER — OFFICE VISIT (OUTPATIENT)
Dept: PHYSICAL THERAPY | Facility: CLINIC | Age: 46
End: 2021-08-03
Payer: OTHER MISCELLANEOUS

## 2021-08-03 VITALS
HEIGHT: 61 IN | WEIGHT: 203 LBS | HEART RATE: 64 BPM | BODY MASS INDEX: 38.33 KG/M2 | OXYGEN SATURATION: 98 % | TEMPERATURE: 97.8 F | DIASTOLIC BLOOD PRESSURE: 80 MMHG | SYSTOLIC BLOOD PRESSURE: 122 MMHG

## 2021-08-03 DIAGNOSIS — E66.9 OBESITY (BMI 30-39.9): ICD-10-CM

## 2021-08-03 DIAGNOSIS — M77.8 RIGHT WRIST TENDONITIS: Primary | ICD-10-CM

## 2021-08-03 DIAGNOSIS — M75.51 BURSITIS OF RIGHT SHOULDER: Primary | ICD-10-CM

## 2021-08-03 DIAGNOSIS — M54.12 CERVICAL RADICULOPATHY: ICD-10-CM

## 2021-08-03 DIAGNOSIS — M77.8 RIGHT WRIST TENDINITIS: ICD-10-CM

## 2021-08-03 DIAGNOSIS — Z00.00 ANNUAL PHYSICAL EXAM: Primary | ICD-10-CM

## 2021-08-03 DIAGNOSIS — H54.7 VISION PROBLEM: ICD-10-CM

## 2021-08-03 PROCEDURE — 97112 NEUROMUSCULAR REEDUCATION: CPT

## 2021-08-03 PROCEDURE — 3008F BODY MASS INDEX DOCD: CPT | Performed by: FAMILY MEDICINE

## 2021-08-03 PROCEDURE — 99386 PREV VISIT NEW AGE 40-64: CPT | Performed by: FAMILY MEDICINE

## 2021-08-03 PROCEDURE — 97110 THERAPEUTIC EXERCISES: CPT

## 2021-08-03 PROCEDURE — 97140 MANUAL THERAPY 1/> REGIONS: CPT

## 2021-08-03 PROCEDURE — 1036F TOBACCO NON-USER: CPT | Performed by: FAMILY MEDICINE

## 2021-08-03 NOTE — PROGRESS NOTES
Daily Note     Today's date: 8/3/2021  Patient name: Ynes Cordon  : 1975  MRN: [id number]  Referring provider: Virginia Moe PA-C  Dx:   Encounter Diagnosis     ICD-10-CM    1  Bursitis of right shoulder  M75 51    2  Cervical radiculopathy  M54 12    3  Right wrist tendinitis  M77 8        Start Time: 0930          Subjective: Pt reports that she will be going for an MRI today, MD has concern for a RTC tear  She notes improvement from the injections she received in her wrist and elbow  Pt's  present to translate  Objective: See treatment diary below      Assessment: Tolerated treatment fair  Her ROM appears to be improving  She can reach 90 degrees of scaption today  Pt does tend to become more painful towards the end of the session with decreased tolerance to some exercises noted  Patient demonstrated fatigue post treatment      Plan: Continue per plan of care  Precautions: Primarily Georgian, nil      Manuals          assessment x 15                                              Neuro Re-Ed             Postural Ed             RC isometrics in 1-2 visits  3" 2x10 3" 2x10 3" 2x5  3" flex and Abd 3" flex and Abd 3" x 10 flex and abd 3" 2x5 flex and ABD 3" 2 x 5 flex and abd 3" x 2 x 5   Wall Slide 2x10  2x10 2x10   3" x 2 x 5 Flex and Abd 3" x 2 x 5 flex and Abd 3"2x5 flex and abd 3" x 2 x 5 3" x 2 x 5   Tband Row + Ext YTB 10 YTB 10 YTB 10        YTB  Ext x 10                              Wand AAROM trial 2x5  Stand  2x5 stand 2x5  stand    Nv? 5x stand flex 5x stand  5x stand   Ther Ex             Pulley Trial nv   As able 3 min  3 min 3 min   3 min 3 min 3 min 4 min  3 min 3 min   CS retraction 2x5  2x5 2x5       2 x 5 2 x 5   CS retraction + Ext     3 x 5 3 x 5 3 x 5 3x5 1 x 5 hold   Table slide flex, abd, ER Back pain, held today 2x10 10x  3" x 20 flex, 3" x 10  3" x 10 3" x 10 2x10 3" 2 x 10 2 x 10   Scaption trial 2x10  2x10 2x5 2 x 5 2 x 5 3 x 5 2x10 2 x 10 4 x 5                          DC   scap add 10x10" nv 10x10"  10"x 10 10" x 10  10" x 10  10x10"  10 x 10"  10" x 10   Ther Activity                                       Gait Training                                       Modalities             C to CS and UE prn   10 min shoulder  home 10 min R shoulder   10 min R shoulder and Wrist   10 min R shoulder and Wrist   10 Min R shoulder and Wrist 10 Min R shoulder and Wrist 10 min R shoulder and Wrist 10 Min to Shoulder

## 2021-08-03 NOTE — PATIENT INSTRUCTIONS
[id number] units of Vitamin D  Wellness Visit for Adults   AMBULATORY CARE:   A wellness visit  is when you see your healthcare provider to get screened for health problems  Your healthcare provider will also give you advice on how to stay healthy  Write down your questions so you remember to ask them  Ask your healthcare provider how often you should have a wellness visit  What happens at a wellness visit:  Your healthcare provider will ask about your health, and your family history of health problems  This includes high blood pressure, heart disease, and cancer  He or she will ask if you have symptoms that concern you, if you smoke, and about your mood  You may also be asked about your intake of medicines, supplements, food, and alcohol  Any of the following may be done:  · Your weight  will be checked  Your height may also be checked so your body mass index (BMI) can be calculated  Your BMI shows if you are at a healthy weight  · Your blood pressure  and heart rate will be checked  Your temperature may also be checked  · Blood and urine tests  may be done  Blood tests may be done to check your cholesterol levels  Abnormal cholesterol levels increase your risk for heart disease and stroke  You may also need a blood or urine test to check for diabetes if you are at increased risk  Urine tests may be done to look for signs of an infection or kidney disease  · A physical exam  includes checking your heartbeat and lungs with a stethoscope  Your healthcare provider may also check your skin to look for sun damage  · Screening tests  may be recommended  A screening test is done to check for diseases that may not cause symptoms  The screening tests you may need depend on your age, gender, family history, and lifestyle habits  For example, colorectal screening may be recommended if you are 48years old or older      Screening tests you need if you are a woman:   · A Pap smear  is used to screen for cervical cancer  Pap smears are usually done every 3 to 5 years depending on your age  You may need them more often if you have had abnormal Pap smear test results in the past  Ask your healthcare provider how often you should have a Pap smear  · A mammogram  is an x-ray of your breasts to screen for breast cancer  Experts recommend mammograms every 2 years starting at age 48 years  You may need a mammogram at age 52 years or younger if you have an increased risk for breast cancer  Talk to your healthcare provider about when you should start having mammograms and how often you need them  Vaccines you may need:   · Get an influenza vaccine  every year  The influenza vaccine protects you from the flu  Several types of viruses cause the flu  The viruses change over time, so new vaccines are made each year  · Get a tetanus-diphtheria (Td) booster vaccine  every 10 years  This vaccine protects you against tetanus and diphtheria  Tetanus is a severe infection that may cause painful muscle spasms and lockjaw  Diphtheria is a severe bacterial infection that causes a thick covering in the back of your mouth and throat  · Get a human papillomavirus (HPV) vaccine  if you are female and aged 23 to 32 or male 23 to 24 and never received it  This vaccine protects you from HPV infection  HPV is the most common infection spread by sexual contact  HPV may also cause vaginal, penile, and anal cancers  · Get a pneumococcal vaccine  if you are aged 72 years or older  The pneumococcal vaccine is an injection given to protect you from pneumococcal disease  Pneumococcal disease is an infection caused by pneumococcal bacteria  The infection may cause pneumonia, meningitis, or an ear infection  · Get a shingles vaccine  if you are 60 or older, even if you have had shingles before  The shingles vaccine is an injection to protect you from the varicella-zoster virus  This is the same virus that causes chickenpox   Shingles is a painful rash that develops in people who had chickenpox or have been exposed to the virus  How to eat healthy:  My Plate is a model for planning healthy meals  It shows the types and amounts of foods that should go on your plate  Fruits and vegetables make up about half of your plate, and grains and protein make up the other half  A serving of dairy is included on the side of your plate  The amount of calories and serving sizes you need depends on your age, gender, weight, and height  Examples of healthy foods are listed below:  · Eat a variety of vegetables  such as dark green, red, and orange vegetables  You can also include canned vegetables low in sodium (salt) and frozen vegetables without added butter or sauces  · Eat a variety of fresh fruits , canned fruit in 100% juice, frozen fruit, and dried fruit  · Include whole grains  At least half of the grains you eat should be whole grains  Examples include whole-wheat bread, wheat pasta, brown rice, and whole-grain cereals such as oatmeal     · Eat a variety of protein foods such as seafood (fish and shellfish), lean meat, and poultry without skin (turkey and chicken)  Examples of lean meats include pork leg, shoulder, or tenderloin, and beef round, sirloin, tenderloin, and extra lean ground beef  Other protein foods include eggs and egg substitutes, beans, peas, soy products, nuts, and seeds  · Choose low-fat dairy products such as skim or 1% milk or low-fat yogurt, cheese, and cottage cheese  · Limit unhealthy fats  such as butter, hard margarine, and shortening  Exercise:  Exercise at least 30 minutes per day on most days of the week  Some examples of exercise include walking, biking, dancing, and swimming  You can also fit in more physical activity by taking the stairs instead of the elevator or parking farther away from stores  Include muscle strengthening activities 2 days each week  Regular exercise provides many health benefits   It helps you manage your weight, and decreases your risk for type 2 diabetes, heart disease, stroke, and high blood pressure  Exercise can also help improve your mood  Ask your healthcare provider about the best exercise plan for you  General health and safety guidelines:   · Do not smoke  Nicotine and other chemicals in cigarettes and cigars can cause lung damage  Ask your healthcare provider for information if you currently smoke and need help to quit  E-cigarettes or smokeless tobacco still contain nicotine  Talk to your healthcare provider before you use these products  · Limit alcohol  A drink of alcohol is 12 ounces of beer, 5 ounces of wine, or 1½ ounces of liquor  · Lose weight, if needed  Being overweight increases your risk of certain health conditions  These include heart disease, high blood pressure, type 2 diabetes, and certain types of cancer  · Protect your skin  Do not sunbathe or use tanning beds  Use sunscreen with a SPF 15 or higher  Apply sunscreen at least 15 minutes before you go outside  Reapply sunscreen every 2 hours  Wear protective clothing, hats, and sunglasses when you are outside  · Drive safely  Always wear your seatbelt  Make sure everyone in your car wears a seatbelt  A seatbelt can save your life if you are in an accident  Do not use your cell phone when you are driving  This could distract you and cause an accident  Pull over if you need to make a call or send a text message  · Practice safe sex  Use latex condoms if are sexually active and have more than one partner  Your healthcare provider may recommend screening tests for sexually transmitted infections (STIs)  · Wear helmets, lifejackets, and protective gear  Always wear a helmet when you ride a bike or motorcycle, go skiing, or play sports that could cause a head injury  Wear protective equipment when you play sports  Wear a lifejacket when you are on a boat or doing water sports      © Copyright [employer] 2021 Information is for [employer] use only and may not be sold, redistributed or otherwise used for commercial purposes  All illustrations and images included in CareNotes® are the copyrighted property of A D A M , Inc  or Hai Sinclair  The above information is an  only  It is not intended as medical advice for individual conditions or treatments  Talk to your doctor, nurse or pharmacist before following any medical regimen to see if it is safe and effective for you

## 2021-08-03 NOTE — PROGRESS NOTES
Daily Note     Today's date: 8/3/2021  Patient name: Lisa De La Garza  : 1975  MRN: [id number]  Referring provider: Elaine Cortes*  Dx:   Encounter Diagnosis     ICD-10-CM    1  Right wrist tendonitis  M77 8                   Subjective: Rates elbow pain 6/10  She reports some relief from injection  Pain is in shoulder, upper arm, and down through hand  Objective: See treatment diary below  Pain to palpation of medial border scap, bicipital groove, significant to supinator belly, resistive third MP ext       Assessment:  Objective measures today indicate PIN irritation  Tolerated session well, reporting relief  Will reassess next visit  Issued KT and compression for swelling  Plan: Continue per plan of care            Precautions: Ukrainian speaking      Manuals 7/8 7/16 7/20 7/29 8/3        IASTM 2-4th dorsal compartments, lateral epicondyle and extensors 8' 10; 10' 10' 10' to extensors, anterior upper quarter        Cupping  3' elbow 3' 5'         KT PRN  dorsal wrist--> elbow X X x        Compression & edu     x        MWM elbow flexion with sup, extension with pro, supinator belly target     10', relief after first set        Neuro Re-Ed             RNG     8'                                                                                      Ther Ex             HEP: Stretching to tolerance issued            Wrist maze  10x 10x 10x         Keypegs  1x 1x 1x         Wall walking   5x 5x         Gentle grasping             Gentle flex bar  YFB 2x10 all planes on table           Isometrics              Tennis ball rubber bands   1x skinny bands  1x skinny bands          Ther Activity                                                                              Modalities             MHP Wrist and elbow 5' 5' 5' 5' 5' upper arm through forearm

## 2021-08-03 NOTE — PROGRESS NOTES
[address] [facility] [place]    NAME: Basilio Stewart  AGE: 54 y o  SEX: female  : 1975     DATE: 2021     Assessment and Plan:     Problem List Items Addressed This Visit        Other    Annual physical exam - Primary     · Patient is without complaints today - 's physical form is completed  · Patient is up to date on mammograms, Pap smears, and is not in need of screening colonoscopy today  · Patient to follow up annually or sooner as needed or for lab abnormalities         Vision problem     · Patient does not see an eye doctor but requires reading glasses  · Referral to ophthalmology for routine exam every 5 years or sooner as needed         Relevant Orders    Ambulatory referral to Ophthalmology    Obesity (BMI 30-39  9)     · Patient is a 54year old female with obesity - routine screening labs ordered for Lipid panel, CBC, CMP, and TSH         Relevant Orders    CBC and differential    Comprehensive metabolic panel    Lipid panel    TSH, 3rd generation with Free T4 reflex          Immunizations and preventive care screenings were discussed with patient today  Appropriate education was printed on patient's after visit summary  Counseling:  Dental Health: discussed importance of regular tooth brushing, flossing, and dental visits  · Exercise: the importance of regular exercise/physical activity was discussed  Recommend exercise 3-5 times per week for at least 30 minutes          Nutrition Assessment and Intervention:     Nutrition patient handout reviewed with patient      Other interventions: Discussed benefits of a diet rich in whole grains, legumes, fruits and vegetables    Physical Activity Assessment and Intervention:    Activity journal reviewed      Other interventions: Discussed the benefits of at least 150 minutes of physical activity per week    Emotional and Mental Well-being, Sleep, Connectedness Assessment and Intervention:    Sleep/stress assessment performed    Counseled regarding sleep hygiene and aspects of healthy sleep      Tobacco and Toxic Substance Assessment and Intervention:     Tobacco use screening performed    Alcohol and drug use screening performed      Therapeutic Lifestyle Change Visit:     One-on-one comprehensive counseling, coaching, and health behavior change visit completed        No follow-ups on file  Chief Complaint:     Chief Complaint   Patient presents with    Physical Exam      History of Present Illness:     Adult Annual Physical   Patient here for a comprehensive physical exam  The patient reports no problems  Diet and Physical Activity  · Diet/Nutrition: consuming 3-5 servings of fruits/vegetables daily  · Exercise: 30-60 minutes on average  Depression Screening  PHQ-9 Depression Screening    PHQ-9:   Frequency of the following problems over the past two weeks:           General Health  · Sleep: sleeps well and gets 7-8 hours of sleep on average  · Hearing: normal - bilateral   · Vision: reading glasses  · Dental: 2 times  /GYN Health  · Last menstrual period: 7/26 -  - 2 children  2 CS  · Contraceptive method: injectable contraception  Review of Systems:     Review of Systems   Constitutional: Negative for chills, fatigue and fever  HENT: Negative for congestion, rhinorrhea, sinus pain and sore throat  Eyes: Negative for visual disturbance  Respiratory: Negative for cough, chest tightness, shortness of breath and wheezing  Cardiovascular: Negative for chest pain, palpitations and leg swelling  Gastrointestinal: Negative for abdominal pain, blood in stool, constipation, diarrhea, nausea and vomiting  Genitourinary: Negative for difficulty urinating, dysuria, genital sores, hematuria, menstrual problem and vaginal discharge  Musculoskeletal: Negative for arthralgias and myalgias  Skin: Negative for rash and wound     Neurological: Negative for dizziness and headaches  Psychiatric/Behavioral: Negative for dysphoric mood  The patient is not nervous/anxious  Past Medical History:     Past Medical History:   Diagnosis Date    Gestational diabetes       Past Surgical History:     Past Surgical History:   Procedure Laterality Date     SECTION  0     SECTION        Social History:     Social History     Socioeconomic History    Marital status:      Spouse name: None    Number of children: 2    Years of education: None    Highest education level: None   Occupational History    None   Tobacco Use    Smoking status: Never Smoker    Smokeless tobacco: Never Used   Vaping Use    Vaping Use: Never used   Substance and Sexual Activity    Alcohol use: No    Drug use: No    Sexual activity: Yes     Partners: Male     Birth control/protection: Injection   Other Topics Concern    None   Social History Narrative    None     Social Determinants of Health     Financial Resource Strain: Low Risk     Difficulty of Paying Living Expenses: Not hard at all   Food Insecurity: No Food Insecurity    Worried About [facility] in the Last Year: Never true    [facility] in the Last Year: Never true   Transportation Needs: No Transportation Needs    Lack of Transportation (Medical): No    Lack of Transportation (Non-Medical):  No   Physical Activity:     Days of Exercise per Week:     Minutes of Exercise per Session:    Stress: No Stress Concern Present    Feeling of Stress : Not at all   Social Connections:     Frequency of Communication with Friends and Family:     Frequency of Social Gatherings with Friends and Family:     Attends Holiness Services:     Active Member of Clubs or Organizations:     Attends Club or Organization Meetings:     Marital Status:    Intimate Partner Violence: Not At Risk    Fear of Current or Ex-Partner: No    Emotionally Abused: No    Physically Abused: No    Sexually Abused: No      Family History:     Family History   Problem Relation Age of Onset    No Known Problems Mother     Lung cancer Father 62        smoker    No Known Problems Daughter     Stomach cancer Maternal Grandmother     No Known Problems Maternal Grandfather     Stomach cancer Paternal Grandmother     No Known Problems Paternal Grandfather     No Known Problems Daughter     No Known Problems Brother     No Known Problems Brother     No Known Problems Brother     No Known Problems Brother     No Known Problems Brother     No Known Problems Sister       Current Medications:     Current Outpatient Medications   Medication Sig Dispense Refill    medroxyPROGESTERone (DEPO-PROVERA) 150 mg/mL injection Inject 1 mL (150 mg total) into a muscle every 3 (three) months 1 mL 4     Current Facility-Administered Medications   Medication Dose Route Frequency Provider Last Rate Last Admin    medroxyPROGESTERone (DEPO-PROVERA) IM injection 150 mg  150 mg Intramuscular Q3 Months PETER Iverson   150 mg at 05/10/21 1245      Allergies:     No Known Allergies   Physical Exam:     /80   Pulse 64   Temp 97 8 °F (36 6 °C)   Ht 5' 1" (1 549 m)   Wt 92 1 kg (203 lb)   SpO2 98%   BMI 38 36 kg/m²     Physical Exam  Vitals reviewed  Constitutional:       General: She is not in acute distress  Appearance: Normal appearance  She is obese  She is not ill-appearing  HENT:      Head: Normocephalic and atraumatic  Right Ear: Tympanic membrane, ear canal and external ear normal       Left Ear: Tympanic membrane, ear canal and external ear normal       Nose: Nose normal       Mouth/Throat:      Mouth: Mucous membranes are moist    Eyes:      General: No scleral icterus  Right eye: No discharge  Left eye: No discharge  Conjunctiva/sclera: Conjunctivae normal       Pupils: Pupils are equal, round, and reactive to light     Cardiovascular:      Rate and Rhythm: Normal rate and regular rhythm  Heart sounds: No murmur heard  No friction rub  No gallop  Pulmonary:      Effort: Pulmonary effort is normal       Breath sounds: Normal breath sounds  No wheezing, rhonchi or rales  Abdominal:      General: Bowel sounds are normal       Palpations: Abdomen is soft  Tenderness: There is no abdominal tenderness  There is no guarding or rebound  Musculoskeletal:      Right lower leg: No edema  Left lower leg: No edema  Skin:     General: Skin is warm and dry  Neurological:      General: No focal deficit present  Mental Status: She is alert and oriented to person, place, and time     Psychiatric:         Mood and Affect: Mood normal          Behavior: Behavior normal           Lacy Lozada DO  [facility]

## 2021-08-04 PROBLEM — H54.7 VISION PROBLEM: Status: ACTIVE | Noted: 2021-08-04

## 2021-08-04 PROBLEM — Z00.00 ANNUAL PHYSICAL EXAM: Status: ACTIVE | Noted: 2020-07-09

## 2021-08-04 PROBLEM — E66.9 OBESITY (BMI 30-39.9): Status: ACTIVE | Noted: 2021-08-04

## 2021-08-05 ENCOUNTER — EVALUATION (OUTPATIENT)
Dept: PHYSICAL THERAPY | Facility: CLINIC | Age: 46
End: 2021-08-05
Payer: OTHER MISCELLANEOUS

## 2021-08-05 ENCOUNTER — OFFICE VISIT (OUTPATIENT)
Dept: OCCUPATIONAL THERAPY | Facility: CLINIC | Age: 46
End: 2021-08-05
Payer: COMMERCIAL

## 2021-08-05 DIAGNOSIS — M54.12 CERVICAL RADICULOPATHY: ICD-10-CM

## 2021-08-05 DIAGNOSIS — M77.8 RIGHT WRIST TENDONITIS: Primary | ICD-10-CM

## 2021-08-05 DIAGNOSIS — M75.51 BURSITIS OF RIGHT SHOULDER: Primary | ICD-10-CM

## 2021-08-05 PROCEDURE — 97110 THERAPEUTIC EXERCISES: CPT | Performed by: PHYSICAL THERAPIST

## 2021-08-05 PROCEDURE — 97140 MANUAL THERAPY 1/> REGIONS: CPT | Performed by: OCCUPATIONAL THERAPIST

## 2021-08-05 PROCEDURE — 97140 MANUAL THERAPY 1/> REGIONS: CPT | Performed by: PHYSICAL THERAPIST

## 2021-08-05 NOTE — ASSESSMENT & PLAN NOTE
· Patient is a 54year old female with obesity - routine screening labs ordered for Lipid panel, CBC, CMP, and TSH

## 2021-08-05 NOTE — ASSESSMENT & PLAN NOTE
· Patient is without complaints today - 's physical form is completed  · Patient is up to date on mammograms, Pap smears, and is not in need of screening colonoscopy today  · Patient to follow up annually or sooner as needed or for lab abnormalities

## 2021-08-05 NOTE — PROGRESS NOTES
Daily Note     Today's date: 2021  Patient name: Elayne Esparza  : 1975  MRN: [id number]  Referring provider: Caryn Davison*  Dx:   Encounter Diagnosis     ICD-10-CM    1  Right wrist tendonitis  M77 8                   Subjective: She reports that she is returning to work tonight, working 12 hour shift at [employer]  She does not know what she will be doing yet at work  Objective: See treatment diary below  Assessment:  Applied KT with more tension, per patient request starting at [facility] through the radial tunnel and past lateral epicondyle  Focused soft tissue mobilization over radial tunnel and supinator  Also issued RNG to HEP  Plan: Continue per plan of care            Precautions: Maori speaking      Manuals 7/8 7/16 7/20 7/29 8/3 8/5       IASTM 2-4th dorsal compartments, lateral epicondyle and extensors 8' 10; 10' 10' 10' to extensors, anterior upper quarter 15'       Cupping  3' elbow 3' 5'  5'       KT PRN  dorsal wrist--> elbow X X x X       Compression & edu     x D tubigrip       MWM elbow flexion with sup, extension with pro, supinator belly target     10', relief after first set        Neuro Re-Ed             RNG     8'                                                                                      Ther Ex             HEP: Stretching to tolerance issued      Radial nerve glide       Wrist maze  10x 10x 10x         Keypegs  1x 1x 1x         Wall walking   5x 5x         Gentle grasping             Gentle flex bar  YFB 2x10 all planes on table           Isometrics              Tennis ball rubber bands   1x skinny bands  1x skinny bands          Ther Activity                                                                              Modalities             MHP Wrist and elbow 5' 5' 5' 5' 5' upper arm through forearm 5'

## 2021-08-05 NOTE — PROGRESS NOTES
PT Evaluation     Today's date: 2021  Patient name: Enrique Clarke  : 1975  MRN: [id number]  Referring provider: Tami Euceda PA-C  Dx:   Encounter Diagnosis     ICD-10-CM    1  Bursitis of right shoulder  M75 51    2  Cervical radiculopathy  M54 12                   Assessment  Assessment details: Pt is scheduled for 2 more week prior with PT care to her visit with Dr Enrique Banegas as she has made some gains in strength and range, however her pain levels continue to appear to remain the same if not slightly worse  With her return to work, this could cause some difficulty with further progression  At this time it is likely best she continues care until her follow up with Dr Enrique Banegas on 2021 and then will allow specialist to determine which may be best whether to continue intervention with PT, or trial other medical interventions outside of PT scope of practice as she is approximately 2 months out from initial beginning of PT and still having quite a bit of irritability and pain  Thank you very much for this kind and motivated referral     Impairments: abnormal or restricted ROM, activity intolerance, impaired physical strength, lacks appropriate home exercise program, pain with function, poor posture  and poor body mechanics  Understanding of Dx/Px/POC: good   Prognosis: good    Goals  STG 4 Weeks:  Decrease pain at worst to 6/10 -met  Improve range to improve Neck range to min, Shoulder range by 20 from IE   -met  Improve strength to to be assessed -met  Independent with HEP -met  LTG 8 Weeks:  Decrease pain at worst to 3/10  Improve range to improve shoulder range to be within 10 of contralateral, no neck deficits  Improve strength to 4/5  Able to perform all desired activities with minimal to nil symptom exacerbation      Plan  Plan details: Continue until follow up with Dr Enrique Banegas on 2021 to determine continued PT intervention or other outcomes necessary     Pt's  is Ary Gongora      Patient would benefit from: skilled physical therapy and OT eval (hand evaluation referral   )  Planned modality interventions: cryotherapy, thermotherapy: hydrocollator packs and TENS  Planned therapy interventions: joint mobilization, manual therapy, abdominal trunk stabilization, neuromuscular re-education, patient education, postural training, strengthening, stretching, therapeutic activities, therapeutic exercise, home exercise program, graded motor, graded exercise, graded activity, functional ROM exercises and flexibility  Frequency: 2x week  Duration in weeks: 10  Treatment plan discussed with: patient and family        Subjective Evaluation    History of Present Illness  Date of onset: 2021  Mechanism of injury: Pt presents today with her  Ary Gongora stating that she is feeling fair  Pt reports that she has returned to work today, and it was 12 hours and it was very challenging very difficult and her pain levels are still remaining as they were prior  She indicates that she feels as though she needs to meet with Dr Paris Wise and possibly get adjustments to her job roll  Pt had MRI scheduled on  which was evident of partial insertional tears of Supra/Infraspinatis and possible Labral degeneration vs tear  Moderate tendinosis also evident within her RC  She and returns back to Dr Paris Wise on 2021  Quality of life: good    Pain  Current pain ratin  At best pain ratin  At worst pain ratin  Quality: radiating and sharp  Relieving factors: rest and medications  Aggravating factors: keyboarding, overhead activity and lifting  Progression: no change      Diagnostic Tests  Abnormal x-ray: Imagery taken, no within network    Treatments  Previous treatment: medication  Patient Goals  Patient goals for therapy: decreased pain, increased motion, increased strength and return to sport/leisure activities          Objective     Active Range of Motion   Left Shoulder   Flexion: 160 degrees   Abduction: 155 degrees   External rotation BTH: C7   Internal rotation BTB: T6     Right Shoulder   Flexion: 115 degrees with pain  Abduction: 100 degrees with pain  External rotation BTH: C2 (unable on 8/5/2021) with pain  Internal rotation BTB: L5 with pain    Additional Active Range of Motion Details  Forward head, rounded shoulders  B/L Sensation intact to light touch C3,4,5,6,7,8,T1,T2  Postural correction:  R forearm  Same  Shoulder strength  L Flex 4- Abd 4- ER 4 IR 4  R Flex 4- Abd 4- ER 3+ IR 3+ (within range)  Elbow Strength  L ROM WFL 5/5 flex/ext, wrist ext/flex 5/5, pro/sup 5/5  ROM WFL  R ROM WFL Strength 3, Wrist and Elbow ROM WFL   L 45# R 13# pain*   CS Screen  Retract max // mod // min  Protrusion min // nil // nil  Flex norm // remains // remains  Ext mod // min // min  SB R mod // min  SB L norm / norm  Rot R min //  norm  Rot L norm // norm  Mechanical Assessment: Retraction: R arm  Retract with Ext: R arm  R arm better, Neck worse  R arm better, neck same  Better  Arm and neck  Better  Better range in shoulder and neck  (remains)   Joint Mobility  Palpation R shoulder anterior and Sub acromion bursa  Olecranon, later epicondyle, and dorsal wrist joint region  Mild inflammation noted at distal wrist                Precautions: Primarily Danish, nil      Manuals 07/20 7/29 08/03 8/5 6/24 6/29 7/1 07/06 7/8 7/16       assessment x 10  assessment x 15                                              Neuro Re-Ed             Postural Ed             RC isometrics in 1-2 visits  3" 2x10 3" 2x10 3" 2x5 3" x 10 3" flex and Abd 3" flex and Abd 3" x 10 flex and abd 3" 2x5 flex and ABD 3" 2 x 5 flex and abd 3" x 2 x 5   Wall Slide 2x10  2x10 2x10 2 x 10  3" x 2 x 5 Flex and Abd 3" x 2 x 5 flex and Abd 3"2x5 flex and abd 3" x 2 x 5 3" x 2 x 5   Tband Row + Ext YTB 10 YTB 10 YTB 10  nv      YTB   Ext x 10                              Wand AAROM trial 2x5  Stand  2x5 stand 2x5  stand 2 x 5   Nv? 5x stand flex 5x stand  5x stand   Ther Ex             Pulley Trial nv  As able 3 min  3 min 3 min  3 min 3 min 3 min 3 min 4 min  3 min 3 min   CS retraction 2x5  2x5 2x5  2 x 5     2 x 5 2 x 5   CS retraction + Ext     3 x 5 3 x 5 3 x 5 3x5 1 x 5 hold   Table slide flex, abd, ER Back pain, held today 2x10 10x 10x 3" x 20 flex, 3" x 10  3" x 10 3" x 10 2x10 3" 2 x 10 2 x 10   Scaption trial 2x10  2x10 2x5  2 x 5 2 x 5 2 x 5 3 x 5 2x10 2 x 10 4 x 5                          DC   scap add 10x10" nv 10x10" 10" x 10 10"x 10 10" x 10  10" x 10  10x10"  10 x 10"  10" x 10   Ther Activity                                       Gait Training                                       Modalities             C to CS and UE prn  10 min shoulder  home 10 min R shoulder  10 min R shoulder    10 min R shoulder and Wrist   10 min R shoulder and Wrist   10 Min R shoulder and Wrist 10 Min R shoulder and Wrist 10 min R shoulder and Wrist 10 Min to Shoulder

## 2021-08-10 ENCOUNTER — APPOINTMENT (OUTPATIENT)
Dept: OCCUPATIONAL THERAPY | Facility: CLINIC | Age: 46
End: 2021-08-10
Payer: COMMERCIAL

## 2021-08-10 ENCOUNTER — APPOINTMENT (OUTPATIENT)
Dept: PHYSICAL THERAPY | Facility: CLINIC | Age: 46
End: 2021-08-10
Payer: OTHER MISCELLANEOUS

## 2021-08-13 ENCOUNTER — OFFICE VISIT (OUTPATIENT)
Dept: OCCUPATIONAL THERAPY | Facility: CLINIC | Age: 46
End: 2021-08-13
Payer: COMMERCIAL

## 2021-08-13 ENCOUNTER — OFFICE VISIT (OUTPATIENT)
Dept: PHYSICAL THERAPY | Facility: CLINIC | Age: 46
End: 2021-08-13
Payer: OTHER MISCELLANEOUS

## 2021-08-13 DIAGNOSIS — M77.8 RIGHT WRIST TENDONITIS: Primary | ICD-10-CM

## 2021-08-13 DIAGNOSIS — M75.51 BURSITIS OF RIGHT SHOULDER: Primary | ICD-10-CM

## 2021-08-13 DIAGNOSIS — M77.8 RIGHT WRIST TENDINITIS: ICD-10-CM

## 2021-08-13 DIAGNOSIS — M54.12 CERVICAL RADICULOPATHY: ICD-10-CM

## 2021-08-13 PROCEDURE — 97112 NEUROMUSCULAR REEDUCATION: CPT

## 2021-08-13 PROCEDURE — 97140 MANUAL THERAPY 1/> REGIONS: CPT | Performed by: OCCUPATIONAL THERAPIST

## 2021-08-13 PROCEDURE — 97112 NEUROMUSCULAR REEDUCATION: CPT | Performed by: OCCUPATIONAL THERAPIST

## 2021-08-13 PROCEDURE — 97110 THERAPEUTIC EXERCISES: CPT | Performed by: OCCUPATIONAL THERAPIST

## 2021-08-13 PROCEDURE — 97110 THERAPEUTIC EXERCISES: CPT

## 2021-08-13 NOTE — PROGRESS NOTES
Daily Note     Today's date: 2021  Patient name: Agus Michel  : 1975  MRN: [id number]  Referring provider: Estee Vale PA-C  Dx:   Encounter Diagnosis     ICD-10-CM    1  Bursitis of right shoulder  M75 51    2  Cervical radiculopathy  M54 12    3  Right wrist tendinitis  M77 8        Start Time: 0900  Stop Time: 0940  Total time in clinic (min): 40 minutes    Subjective: Pt reports that she is very tired because she worked all night and then had apts this morning and has not slept yet  She will see her MD next week  She is having a difficulty time working and has to use her L UE for most of the tasks  Objective: See treatment diary below      Assessment: Tolerated treatment fair  Pt was very painful today and had difficulty tolerating exercises  Patient demonstrated fatigue post treatment      Plan: Continue per plan of care  Precautions: Primarily Russian, nil      Manuals        assessment x 10                                                Neuro Re-Ed             Postural Ed             RC isometrics in 1-2 visits  3" 2x10 3" 2x10 3" 2x5 3" x 10 3"x10  3" x 10 flex and abd 3" 2x5 flex and ABD 3" 2 x 5 flex and abd 3" x 2 x 5   Wall Slide 2x10  2x10 2x10 2 x 10 2x10  3" x 2 x 5 flex and Abd 3"2x5 flex and abd 3" x 2 x 5 3" x 2 x 5   Tband Row + Ext YTB 10 YTB 10 YTB 10  nv nv     YTB  Ext x 10                              Wand AAROM trial 2x5  Stand  2x5 stand 2x5  stand 2 x 5 5x stand  Nv? 5x stand flex 5x stand  5x stand   Ther Ex             Pulley Trial nv   As able 3 min  3 min 3 min  3 min 4 min   3 min 4 min  3 min 3 min   CS retraction 2x5  2x5 2x5  2 x 5 2x5     2 x 5 2 x 5   CS retraction + Ext       3 x 5 3x5 1 x 5 hold   Table slide flex, abd, ER Back pain, held today 2x10 10x 10x 10x   3" x 10 2x10 3" 2 x 10 2 x 10   Scaption trial 2x10  2x10 2x5  2 x 5 np   3 x 5 2x10 2 x 10 4 x 5                          DC   scap add 10x10" nv 10x10" 10" x 10 10x10"   10" x 10  10x10"  10 x 10"  10" x 10   Ther Activity                                       Gait Training                                       Modalities             C to CS and UE prn  10 min shoulder  home 10 min R shoulder  10 min R shoulder    10 min R shoulder and elbow  10 Min R shoulder and Wrist 10 Min R shoulder and Wrist 10 min R shoulder and Wrist 10 Min to Shoulder

## 2021-08-13 NOTE — PROGRESS NOTES
Daily Note     Today's date: 2021  Patient name: Agus Michel  : 1975  MRN: [id number]  Referring provider: Annette Yu*  Dx:   Encounter Diagnosis     ICD-10-CM    1  Right wrist tendonitis  M77 8                   Subjective: Most pain localized to the dorsal wrist today  Objective: See treatment diary below  Assessment: Pain is limiting passive stretching and also soft tissue mobilization performance  Pain in wrist occurs prior to end range  Plan: Continue per plan of care            Precautions: Yoruba speaking      Manuals 7/8 7/16 7/20 7/29 8/3 8/5 8/13      IASTM 2-4th dorsal compartments, lateral epicondyle and extensors 8' 10; 10' 10' 10' to extensors, anterior upper quarter 15' 20'      Cupping  3' elbow 3' 5'  5' 5'      KT PRN  dorsal wrist--> elbow X X x X laterel epicondle, DRSN      Compression & edu     x D tubigrip       MWM elbow flexion with sup, extension with pro, supinator belly target     10', relief after first set        Neuro Re-Ed             RNG     8'  8'                                                                                    Ther Ex             HEP: Stretching to tolerance issued      Radial nerve glide       Wrist maze  10x 10x 10x         Keypegs  1x 1x 1x         Wall walking   5x 5x         Gentle grasping             Gentle flex bar  YFB 2x10 all planes on table           Isometrics              Tennis ball rubber bands   1x skinny bands  1x skinny bands          Stretching to tolerance for wrist       8'      Ther Activity                                                                              Modalities             MHP Wrist and elbow 5' 5' 5' 5' 5' upper arm through forearm 5' 5'

## 2021-08-17 ENCOUNTER — APPOINTMENT (OUTPATIENT)
Dept: PHYSICAL THERAPY | Facility: CLINIC | Age: 46
End: 2021-08-17
Payer: OTHER MISCELLANEOUS

## 2021-08-17 ENCOUNTER — APPOINTMENT (OUTPATIENT)
Dept: OCCUPATIONAL THERAPY | Facility: CLINIC | Age: 46
End: 2021-08-17
Payer: COMMERCIAL

## 2021-08-18 ENCOUNTER — OFFICE VISIT (OUTPATIENT)
Dept: OBGYN CLINIC | Facility: CLINIC | Age: 46
End: 2021-08-18
Payer: COMMERCIAL

## 2021-08-18 VITALS
HEIGHT: 61 IN | BODY MASS INDEX: 38.14 KG/M2 | HEART RATE: 77 BPM | SYSTOLIC BLOOD PRESSURE: 106 MMHG | DIASTOLIC BLOOD PRESSURE: 72 MMHG | WEIGHT: 202 LBS

## 2021-08-18 DIAGNOSIS — M75.51 BURSITIS OF RIGHT SHOULDER: ICD-10-CM

## 2021-08-18 DIAGNOSIS — M75.81 ROTATOR CUFF TENDONITIS, RIGHT: Primary | ICD-10-CM

## 2021-08-18 PROCEDURE — 3008F BODY MASS INDEX DOCD: CPT | Performed by: ORTHOPAEDIC SURGERY

## 2021-08-18 PROCEDURE — 1036F TOBACCO NON-USER: CPT | Performed by: ORTHOPAEDIC SURGERY

## 2021-08-18 PROCEDURE — 99214 OFFICE O/P EST MOD 30 MIN: CPT | Performed by: ORTHOPAEDIC SURGERY

## 2021-08-18 PROCEDURE — 20610 DRAIN/INJ JOINT/BURSA W/O US: CPT | Performed by: ORTHOPAEDIC SURGERY

## 2021-08-18 RX ORDER — LIDOCAINE HYDROCHLORIDE 10 MG/ML
4 INJECTION, SOLUTION INFILTRATION; PERINEURAL
Status: COMPLETED | OUTPATIENT
Start: 2021-08-18 | End: 2021-08-18

## 2021-08-18 RX ORDER — DEXAMETHASONE SODIUM PHOSPHATE 100 MG/10ML
40 INJECTION INTRAMUSCULAR; INTRAVENOUS
Status: COMPLETED | OUTPATIENT
Start: 2021-08-18 | End: 2021-08-18

## 2021-08-18 RX ADMIN — DEXAMETHASONE SODIUM PHOSPHATE 40 MG: 100 INJECTION INTRAMUSCULAR; INTRAVENOUS at 14:42

## 2021-08-18 RX ADMIN — LIDOCAINE HYDROCHLORIDE 4 ML: 10 INJECTION, SOLUTION INFILTRATION; PERINEURAL at 14:42

## 2021-08-18 NOTE — PROGRESS NOTES
Assessment/Plan:  1  Rotator cuff tendonitis, right  Large joint arthrocentesis: R subacromial bursa   2  Bursitis of right shoulder         Scribe Attestation    I,:  Richie Martins MA am acting as a scribe while in the presence of the attending physician :       I,:  Harley Ying MD personally performed the services described in this documentation    as scribed in my presence :         Jim Carlisle, her , and I reviewed her MRI today in the office  I discussed with them that she does have severe tendinitis, along with inflammation, as well as minimal tears to the rotator cuff, and mild glenohumeral and acromioclavicular joint arthritis  I discussed with them that surgical intervention is not indicated for these conditions and that she can be treated non operatively with physical therapy and corticosteroid injection  Patient was agreeable to this plan  She is already in physical therapy and will continue with her exercises per protocol  Patient was provided with a corticosteroid injection to the subacromial space today in the office  This was tolerated well  We did discuss her conditions can be very painful especially with her repetitive tasks at work  Therefore I will keep her out of work for 4 weeks to allow her to recover and continue with physical therapy  Will see her back in 4 weeks for re-evaluation, new work note will be provided at that time  She should continue to follow-up with Dr Estevan Bosworth in regards to her wrist tendinitis  Subjective:   Enrique Clarke is a 54 y o  female who presents to the office today for a follow up evaluation of her right shoulder  Patient was able to obtain MRI prior to today's visit  She continues to note significant right shoulder pain that is worse with increased activities  She has returned to work with the restrictions of no lifting greater than 5 lb and no lifting overhead     Patient states that she was unable to tolerate this work due to her significant right upper extremity pain  She does have a history of right wrist tendinitis as well which is currently being treated by Dr Mikala Maldonado  She notes no new injuries  She has been using ice for pain relief  Review of Systems   Constitutional: Negative for chills, fever and unexpected weight change  HENT: Negative for hearing loss, nosebleeds and sore throat  Eyes: Negative for pain, redness and visual disturbance  Respiratory: Negative for cough, shortness of breath and wheezing  Cardiovascular: Negative for chest pain, palpitations and leg swelling  Gastrointestinal: Negative for abdominal pain, nausea and vomiting  Endocrine: Negative for polydipsia and polyuria  Genitourinary: Negative for dyspareunia and hematuria  Musculoskeletal: Positive for arthralgias and myalgias  Negative for joint swelling  Skin: Negative for rash and wound  Neurological: Negative for dizziness, numbness and headaches  Psychiatric/Behavioral: Negative for decreased concentration and suicidal ideas  The patient is not nervous/anxious            Past Medical History:   Diagnosis Date    Gestational diabetes        Past Surgical History:   Procedure Laterality Date     SECTION  0     SECTION  2016       Family History   Problem Relation Age of Onset    No Known Problems Mother     Lung cancer Father 62        smoker    No Known Problems Daughter     Stomach cancer Maternal Grandmother     No Known Problems Maternal Grandfather     Stomach cancer Paternal Grandmother     No Known Problems Paternal Grandfather     No Known Problems Daughter     No Known Problems Brother     No Known Problems Brother     No Known Problems Brother     No Known Problems Brother     No Known Problems Brother     No Known Problems Sister        Social History     Occupational History    Not on file   Tobacco Use    Smoking status: Never Smoker    Smokeless tobacco: Never Used   Vaping Use    Vaping Use: Never used   Substance and Sexual Activity    Alcohol use: No    Drug use: No    Sexual activity: Yes     Partners: Male     Birth control/protection: Injection         Current Outpatient Medications:     medroxyPROGESTERone (DEPO-PROVERA) 150 mg/mL injection, Inject 1 mL (150 mg total) into a muscle every 3 (three) months, Disp: 1 mL, Rfl: 4    Current Facility-Administered Medications:     medroxyPROGESTERone (DEPO-PROVERA) IM injection 150 mg, 150 mg, Intramuscular, Q3 Months, PETER Iverson, 150 mg at 05/10/21 1245    No Known Allergies    Objective:  Vitals:    08/18/21 1414   BP: 106/72   Pulse: 77       Right Shoulder Exam     Tenderness   The patient is experiencing no tenderness  Range of Motion   Active abduction: 90   External rotation: 50   Internal rotation 0 degrees: Sacrum     Muscle Strength   Abduction: 3/5   Internal rotation: 4/5   External rotation: 4/5     Other   Erythema: absent  Scars: absent  Sensation: normal  Pulse: present            Physical Exam  Vitals reviewed  Constitutional:       Appearance: She is well-developed  HENT:      Head: Normocephalic and atraumatic  Eyes:      General: No scleral icterus  Conjunctiva/sclera: Conjunctivae normal    Cardiovascular:      Rate and Rhythm: Normal rate  Pulmonary:      Effort: Pulmonary effort is normal  No respiratory distress  Musculoskeletal:      Cervical back: Normal range of motion and neck supple  Comments: As noted in HPI   Skin:     General: Skin is warm and dry  Neurological:      Mental Status: She is alert and oriented to person, place, and time  Psychiatric:         Behavior: Behavior normal          I have personally reviewed pertinent films in PACS and my interpretation is as follows:    MRI of the right shoulder obtained on 08/03/2021 demonstrates moderate tendinosis of the supraspinatus and infraspinatus with partial tears  No full-thickness rotator cuff tear noted  Mild degenerative changes noted to the [facility] joint and glenohumeral joint  Large joint arthrocentesis: R subacromial bursa  Universal Protocol:  Consent: Verbal consent obtained    Consent given by: patient  Patient identity confirmed: verbally with patient    Supporting Documentation  Indications: pain   Procedure Details  Location: shoulder - R subacromial bursa  Needle size: 22 G  Ultrasound guidance: no  Medications administered: 4 mL lidocaine 1 %; 40 mg dexamethasone 100 mg/10 mL    Patient tolerance: patient tolerated the procedure well with no immediate complications  Dressing:  Sterile dressing applied

## 2021-08-18 NOTE — LETTER
August 18, 2021     Patient: Opal Ly   YOB: 1975   Date of Visit: 8/18/2021       To Whom it May Concern:    Adali De Los Santos is under my professional care  She was seen in my office on 8/18/2021  She is to remain out of work at this time  She will be re-evaluated in 4 week time and new work note will be provided at the next visit  If you have any questions or concerns, please don't hesitate to call           Sincerely,          Maribeth Rogel MD        CC: No Recipients

## 2021-08-19 ENCOUNTER — TELEPHONE (OUTPATIENT)
Dept: OBGYN CLINIC | Facility: OTHER | Age: 46
End: 2021-08-19

## 2021-08-19 NOTE — TELEPHONE ENCOUNTER
Tamia @ [employer] is requesting Office Notes & Work Letter to be faxed      Fax # [phone]  Attn : Geovani Steen

## 2021-08-23 ENCOUNTER — OFFICE VISIT (OUTPATIENT)
Dept: OCCUPATIONAL THERAPY | Facility: CLINIC | Age: 46
End: 2021-08-23
Payer: COMMERCIAL

## 2021-08-23 ENCOUNTER — OFFICE VISIT (OUTPATIENT)
Dept: PHYSICAL THERAPY | Facility: CLINIC | Age: 46
End: 2021-08-23
Payer: OTHER MISCELLANEOUS

## 2021-08-23 DIAGNOSIS — M54.12 CERVICAL RADICULOPATHY: ICD-10-CM

## 2021-08-23 DIAGNOSIS — M77.8 RIGHT WRIST TENDONITIS: Primary | ICD-10-CM

## 2021-08-23 DIAGNOSIS — M75.51 BURSITIS OF RIGHT SHOULDER: Primary | ICD-10-CM

## 2021-08-23 DIAGNOSIS — M77.8 RIGHT WRIST TENDINITIS: ICD-10-CM

## 2021-08-23 PROCEDURE — 97112 NEUROMUSCULAR REEDUCATION: CPT

## 2021-08-23 PROCEDURE — 97140 MANUAL THERAPY 1/> REGIONS: CPT

## 2021-08-23 PROCEDURE — 97110 THERAPEUTIC EXERCISES: CPT

## 2021-08-23 NOTE — PROGRESS NOTES
Addended by: CYNTHIA AQUINO on: 8/7/2017 01:10 PM     Modules accepted: Orders     Daily Note     Today's date: 2021  Patient name: Marlin Fair  : 1975  MRN: [id number]  Referring provider: Aiyana Russell PA-C  Dx:   Encounter Diagnosis     ICD-10-CM    1  Bursitis of right shoulder  M75 51    2  Cervical radiculopathy  M54 12    3  Right wrist tendinitis  M77 8        Start Time: 1200  Stop Time: 1250  Total time in clinic (min): 50 minutes    Subjective: Pt reports that she saw her MD last week and he gave her an injection, she has had some relief with it  She will remain OOW for 4 weeks  Is still having difficulty falling/staying asleep due to shoulder pain  Objective: See treatment diary below      Assessment: Tolerated treatment fair  Pt had much better tolerance to interventions today  Patient demonstrated fatigue post treatment      Plan: Continue per plan of care  Precautions: Primarily Kyrgyz, nil      Manuals  8       assessment x 10                                                Neuro Re-Ed             Postural Ed             RC isometrics in 1-2 visits  3" 2x10 3" 2x10 3" 2x5 3" x 10 3"x10 3"x10  3" 2x5 flex and ABD 3" 2 x 5 flex and abd 3" x 2 x 5   Wall Slide 2x10  2x10 2x10 2 x 10 2x10 2x10  3"2x5 flex and abd 3" x 2 x 5 3" x 2 x 5   Tband Row + Ext YTB 10 YTB 10 YTB 10  nv nv YTB 2x10     YTB  Ext x 10                              Wand AAROM trial 2x5  Stand  2x5 stand 2x5  stand 2 x 5 5x stand 10x stand  5x stand flex 5x stand  5x stand   Ther Ex             Pulley Trial nv   As able 3 min  3 min 3 min  3 min 4 min  4 min  4 min  3 min 3 min   CS retraction 2x5  2x5 2x5  2 x 5 2x5  2x5   2 x 5 2 x 5   CS retraction + Ext        3x5 1 x 5 hold   Table slide flex, abd, ER Back pain, held today 2x10 10x 10x 10x  10x 3"  2x10 3" 2 x 10 2 x 10   Scaption trial 2x10  2x10 2x5  2 x 5 np  nv  2x10 2 x 10 4 x 5                          DC   scap add 10x10" nv 10x10" 10" x 10 10x10"  10x10"  10x10"  10 x 10"  10" x 10   Ther Activity                                       Gait Training                                       Modalities             C to CS and UE prn  10 min shoulder  home 10 min R shoulder  10 min R shoulder    10 min R shoulder and elbow 10 min R elbow and shoulder   10 Min R shoulder and Wrist 10 min R shoulder and Wrist 10 Min to Shoulder

## 2021-08-23 NOTE — PROGRESS NOTES
Daily Note     Today's date: 2021  Patient name: Gogo Boateng  : 1975  MRN: [id number]  Referring provider: Adria Bautista*  Dx:   Encounter Diagnosis     ICD-10-CM    1  Right wrist tendonitis  M77 8                   Subjective: Pt c/o moderate pain level dorsal wrist to elbow  Objective: See treatment diary below  Assessment: Pt guards arm during therapy, has difficulty relaxing arm - continuous VC's to do so  Pt has significant tightness in extensors, and swelling dorsal wrist and lateral elbow  Applied KT as this helps decrease pain and discomfort  Plan: Continue per plan of care          Precautions: Slovenian speaking      Manuals 7/8 7/16 7/20 7/29 8/3 8/5 8/13 8/23     IASTM 2-4th dorsal compartments, lateral epicondyle and extensors 8' 10; 10' 10' 10' to extensors, anterior upper quarter 15' 20' 25'     Cupping  3' elbow 3' 5'  5' 5' 5' lat elbow, dorsal wrist     KT PRN  dorsal wrist--> elbow X X x X laterel epicondle, DRSN KT     Compression & edu     x D tubigrip       MWM elbow flexion with sup, extension with pro, supinator belly target     10', relief after first set        Neuro Re-Ed             RNG     8'  8' 5'     Nirschl stretches        5'                                                                      Ther Ex             HEP: Stretching to tolerance issued      Radial nerve glide       Wrist maze  10x 10x 10x         Keypegs  1x 1x 1x         Wall walking   5x 5x         Gentle grasping             Gentle flex bar  YFB 2x10 all planes on table           Isometrics              Tennis ball rubber bands   1x skinny bands  1x skinny bands          Stretching to tolerance for wrist       8' 5'     Ther Activity                                                                              Modalities             MHP Wrist and elbow 5' 5' 5' 5' 5' upper arm through forearm 5' 5' 5'

## 2021-08-30 ENCOUNTER — EVALUATION (OUTPATIENT)
Dept: OCCUPATIONAL THERAPY | Facility: CLINIC | Age: 46
End: 2021-08-30
Payer: COMMERCIAL

## 2021-08-30 ENCOUNTER — OFFICE VISIT (OUTPATIENT)
Dept: PHYSICAL THERAPY | Facility: CLINIC | Age: 46
End: 2021-08-30
Payer: OTHER MISCELLANEOUS

## 2021-08-30 DIAGNOSIS — M77.8 RIGHT WRIST TENDINITIS: ICD-10-CM

## 2021-08-30 DIAGNOSIS — M77.8 RIGHT WRIST TENDONITIS: Primary | ICD-10-CM

## 2021-08-30 DIAGNOSIS — M75.51 BURSITIS OF RIGHT SHOULDER: Primary | ICD-10-CM

## 2021-08-30 DIAGNOSIS — M54.12 CERVICAL RADICULOPATHY: ICD-10-CM

## 2021-08-30 PROCEDURE — 97112 NEUROMUSCULAR REEDUCATION: CPT

## 2021-08-30 PROCEDURE — 97112 NEUROMUSCULAR REEDUCATION: CPT | Performed by: OCCUPATIONAL THERAPIST

## 2021-08-30 PROCEDURE — 97140 MANUAL THERAPY 1/> REGIONS: CPT | Performed by: OCCUPATIONAL THERAPIST

## 2021-08-30 PROCEDURE — 97110 THERAPEUTIC EXERCISES: CPT

## 2021-08-30 PROCEDURE — 97110 THERAPEUTIC EXERCISES: CPT | Performed by: OCCUPATIONAL THERAPIST

## 2021-08-30 NOTE — PROGRESS NOTES
Daily Note     Today's date: 2021  Patient name: Vinny Willoughby  : 1975  MRN: [id number]  Referring provider: Zuly Lovett PA-C  Dx:   Encounter Diagnosis     ICD-10-CM    1  Bursitis of right shoulder  M75 51    2  Cervical radiculopathy  M54 12    3  Right wrist tendinitis  M77 8        Start Time: 1100  Stop Time: 1153  Total time in clinic (min): 53 minutes    Subjective: Pt reports that she has noticed decreased shoulder pain since she received the injection  Her biggest complain remains her wrist and elbow which are being treated with OT  Objective: See treatment diary below      Assessment: Tolerated treatment fair  Pt again had much less complaints of pain during exercise today  She is still painful with AROM scaption above 90 degrees but her AAROM is grossly 120 degrees in flexion  Patient demonstrated fatigue post treatment      Plan: Continue per plan of care  Precautions: Primarily Kazakh, nil      Manuals        assessment x 10                                                Neuro Re-Ed             Postural Ed             RC isometrics in 1-2 visits  3" 2x10 3" 2x10 3" 2x5 3" x 10 3"x10 3"x10 3"x10  3" 2 x 5 flex and abd 3" x 2 x 5   Wall Slide 2x10  2x10 2x10 2 x 10 2x10 2x10 2x10   3" x 2 x 5 3" x 2 x 5   Tband Row + Ext YTB 10 YTB 10 YTB 10  nv nv YTB 2x10  YTB 2x10   YTB  Ext x 10                              Wand AAROM trial 2x5  Stand  2x5 stand 2x5  stand 2 x 5 5x stand 10x stand 10x stand  5x stand  5x stand   Ther Ex             Pulley Trial nv   As able 3 min  3 min 3 min  3 min 4 min  4 min 4 min   3 min 3 min   CS retraction 2x5  2x5 2x5  2 x 5 2x5  2x5 2x5   2 x 5 2 x 5   CS retraction + Ext         1 x 5 hold   Table slide flex, abd, ER Back pain, held today 2x10 10x 10x 10x  10x 3" 10x3"  2 x 10 2 x 10   Scaption trial 2x10  2x10 2x5  2 x 5 np  nv 10x  2 x 10 4 x 5                          DC   scap add 10x10" nv 10x10" 10" x 10 10x10"  10x10" 10x10"  10 x 10"  10" x 10   Ther Activity                                       Gait Training                                       Modalities             C to CS and UE prn  10 min shoulder  home 10 min R shoulder  10 min R shoulder    10 min R shoulder and elbow 10 min R elbow and shoulder  10 min R elbow and shoulder  10 min R shoulder and Wrist 10 Min to Shoulder

## 2021-08-30 NOTE — PROGRESS NOTES
OT Re-Evaluation     Today's date: 2021  Patient name: Agus Michel  : 1975  MRN: [id number]  Referring provider: Annette Yu*  Dx:   Encounter Diagnosis     ICD-10-CM    1  Right wrist tendonitis  M77 8                   Assessment  Assessment details: Katie Garcia is referred to therapy for RUE impairments and is accompanied by her  who is acting as her , as she is primarily [address] speaking  She has complaints of generalized pain from her hand to her elbow  It appears that her impairments are multifactorial  She is also receiving PT for her right shoulder and neck  She has been using a wrist cock up brace during the day and at night  Her impairments are primarily pain related at this time  Therapy has been difficult to progress due to pain during soft tissue mobilization and stretching  There has not been significant improvement regarding strength in the hand or UE  She went back to work temporarily, but then due to exacerbation of symptoms, took more time off  She continues with findings suggestive of right lateral epicondylitis including tenderness at the lateral epicondyle, pain with grasping, weakness, and positive provacative testing  Additionally, she demonstrates irritation throughout the dorsal compartment, primarily EDC  There is also tenderness at the distal bicep insertion, and throughout the radial tunnel  ULTT for radial nerve is positive  She does not complain of numbness or tingling otherwise  She will benefit from soft tissue mobilization for the wrist extensors and at the lateral epicondyle, radial nerve gliding, stretching of the wrist and hand, strengthening, and eventual functional re-training to return to work  See below for a detailed assessment     Impairments: abnormal or restricted ROM, activity intolerance, impaired physical strength, lacks appropriate home exercise program and pain with function    Symptom irritability: high  Goals  STG: Patient will be compliant with home exercise program in 1 week  -MET  STG: Pain will not exceed a 6/10 during appropriate activity and during therapy in 4 weeks  - PARTIALLY MET  STG: Range of motion of right wrist will be improved contralateral side measures in 4 weeks weeks  - MET  STG: Strength will be improved to =20, lateral pinch=3 pounds, 3 point pinch=4 pounds in 4 weeks - NOT MET    LTG: Pain will not exceed a 4/10 during appropriate activity and during therapy in 6-8 weeks or discharge  LTG: Strength will be improved to 50% the contralateral side in 6-8 weeks or discharge  LTG: Performance in ADLs and IADLS will be improved to prior level of function with the affected extremity within 6 weeks or discharge  LTG: Performance in work activity will be improved to prior level of function with the affected extremity within 6 weeks or discharge  LTG: FOTO score increase by 20 points within 6 weeks or discharge  Plan  Plan details: Treatment to include modalities, manual therapy, PRE's, HEP, and orthotics as appropriate  Patient would benefit from: skilled OT, OT eval and custom splinting  Planned modality interventions: thermotherapy: hydrocollator packs  Planned therapy interventions: home exercise program, joint mobilization, manual therapy, therapeutic exercise, patient education, therapeutic activities, strengthening and stretching  Frequency: 2x week  Duration in visits: [address] beginning date: 8/30/2021  Plan of Care expiration date: 10/29/2021  Treatment plan discussed with: patient and family        Subjective Evaluation    History of Present Illness  Mechanism of injury: Alex Scott reports that pain in her RUE began a few months ago, and started gradually  She attributes it to working, 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  She has been working 12 hours a day but is now taking time off due to her pain   She is also receiving PT treatment for her right shoulder and neck  Pain  Current pain ratin  Pain scale at highest: 6/10 elbow and wrist   Location: Lateral elbow, central wrist   Progression: improved    Social Support    Employment status: working (Works as a , now off for 4 weeks)  Hand dominance: right    Treatments  Previous treatment: physical therapy (for shoulder )  Current treatment: occupational therapy  Patient Goals  Patient goals for therapy: increased strength, decreased pain, increased motion and return to work          Objective     Palpation     Right   Tenderness of the wrist extensors  Trigger point to wrist extensors  Tenderness     Right Elbow   Tenderness in the distal biceps tendon and lateral epicondyle  Right Wrist/Hand   Tenderness in the fourth dorsal compartment, common extensor tendon, distal biceps tendon and lateral epicondyle  No tenderness in the first dorsal compartment, second dorsal compartment, third dorsal compartment and triangular fibrocartilage complex   Neurological Testing     Additional Neurological Details  Denies numbness and tingling      Active Range of Motion     Right Elbow   Flexion: WFL and with pain  Extension: WFL    Left Wrist   Wrist flexion: 65 degrees   Wrist extension: 65 degrees   Radial deviation: 15 degrees   Ulnar deviation: 45 degrees     Right Wrist   Wrist flexion: 55 (was 43) degrees   Wrist extension: 60 (was 50) degrees   Radial deviation: 12 degrees   Ulnar deviation: 40 degrees     Additional Active Range of Motion Details  Wrist flexion with digit flexion=19 degrees (was 10 degrees)    Strength/Myotome Testing     Left Wrist/Hand      (2nd hand position)     Trial 1: 45 8    Thumb Strength  Key/Lateral Pinch     Trial 1: 7 6  Palmar/Three-Point Pinch     Trial 1: 6 7    Right Wrist/Hand   Wrist extension: 4- (weakness due to pain)  Wrist flexion: 4- (weakness due to pain)     (2nd hand position)     Trial 1: 6 4    Trial 2: 6 6    Trial 3: 4    Thumb Strength Key/Lateral Pinch     Trial 1: 1 5  Palmar/Three-Point Pinch     Trial 1: 0    Comments: Was 1 4#          Additional Strength Details  +stress position testing for lateral elbow pain    Tests     Right Shoulder   Positive ULTT3  Right Elbow   Negative Cozen's and Mill's  Right Wrist/Hand   Positive extrinsic extensor tightness  Negative Finkelstein's and Tinel's sign (radial tunnel)       Additional Tests Details  +RROM Supination   +RROM elbow flexion  -RROM EPL  +RROM EDC  -RROM WE  -RROM APL, EPB                 Precautions: Hungarian speaking      Manuals 7/8 7/16 7/20 7/29 8/3 8/5 8/13 8/23 8/30    IASTM 2-4th dorsal compartments, lateral epicondyle and extensors 8' 10; 10' 10' 10' to extensors, anterior upper quarter 15' 20' 25' 25'    Cupping  3' elbow 3' 5'  5' 5' 5' lat elbow, dorsal wrist 5' lateral elbow, radial tunnel    KT PRN  dorsal wrist--> elbow X X x X laterel epicondle, DRSN KT Lateral elbow, radial tunnel    Compression & edu     x D tubigrip       MWM elbow flexion with sup, extension with pro, supinator belly target     10', relief after first set        Neuro Re-Ed             RNG     8'  8' 5' Supine, 8'    Nirschl stretches        5'                                                                      Ther Ex             HEP: Stretching to tolerance issued      Radial nerve glide       Re-eval         performed    Wrist maze  10x 10x 10x         Keypegs  1x 1x 1x         Wall walking   5x 5x         Gentle grasping             Gentle flex bar  YFB 2x10 all planes on table           Isometrics              Tennis ball rubber bands   1x skinny bands  1x skinny bands          Stretching to tolerance for wrist       8' 5'     Ther Activity                                                                              Modalities             MHP Wrist and elbow 5' 5' 5' 5' 5' upper arm through forearm 5' 5' 5' 5' elbow and wrist

## 2021-09-03 ENCOUNTER — OFFICE VISIT (OUTPATIENT)
Dept: PHYSICAL THERAPY | Facility: CLINIC | Age: 46
End: 2021-09-03
Payer: COMMERCIAL

## 2021-09-03 ENCOUNTER — OFFICE VISIT (OUTPATIENT)
Dept: OCCUPATIONAL THERAPY | Facility: CLINIC | Age: 46
End: 2021-09-03
Payer: COMMERCIAL

## 2021-09-03 DIAGNOSIS — M77.8 RIGHT WRIST TENDONITIS: Primary | ICD-10-CM

## 2021-09-03 DIAGNOSIS — M77.8 RIGHT WRIST TENDINITIS: ICD-10-CM

## 2021-09-03 DIAGNOSIS — M75.51 BURSITIS OF RIGHT SHOULDER: Primary | ICD-10-CM

## 2021-09-03 DIAGNOSIS — M54.12 CERVICAL RADICULOPATHY: ICD-10-CM

## 2021-09-03 PROCEDURE — 97110 THERAPEUTIC EXERCISES: CPT

## 2021-09-03 PROCEDURE — 97140 MANUAL THERAPY 1/> REGIONS: CPT

## 2021-09-03 PROCEDURE — 97112 NEUROMUSCULAR REEDUCATION: CPT

## 2021-09-03 NOTE — PROGRESS NOTES
Daily Note     Today's date: 9/3/2021  Patient name: Jeimy Yu  : 1975  MRN: [id number]  Referring provider: Jessica Salamanca PA-C  Dx:   Encounter Diagnosis     ICD-10-CM    1  Bursitis of right shoulder  M75 51    2  Cervical radiculopathy  M54 12    3  Right wrist tendinitis  M77 8        Start Time: 1430  Stop Time: 1520  Total time in clinic (min): 50 minutes    Subjective: Pt reports that her shoulder feels about the same  Objective: See treatment diary below      Assessment: Tolerated treatment fair  Slow progression due to irritability of symptoms but pt was able to progress with TB resistance  Most limited with OH motions  Patient demonstrated fatigue post treatment      Plan: Continue per plan of care  Precautions: Primarily Monegasque, nil      Manuals        assessment x 10                                                Neuro Re-Ed             Postural Ed             RC isometrics in 1-2 visits  3" 2x10 3" 2x10 3" 2x5 3" x 10 3"x10 3"x10 3"x10 3"x10  3" x 2 x 5   Wall Slide 2x10  2x10 2x10 2 x 10 2x10 2x10 2x10  2x10   3" x 2 x 5   Tband Row + Ext YTB 10 YTB 10 YTB 10  nv nv YTB 2x10  YTB 2x10 RTB 2x10  YTB  Ext x 10                              Wand AAROM trial 2x5  Stand  2x5 stand 2x5  stand 2 x 5 5x stand 10x stand 10x stand 10x stand  5x stand   Ther Ex             Pulley Trial nv   As able 3 min  3 min 3 min  3 min 4 min  4 min 4 min  4 min   3 min   CS retraction 2x5  2x5 2x5  2 x 5 2x5  2x5 2x5  2x5  2 x 5   CS retraction + Ext          hold   Table slide flex, abd, ER Back pain, held today 2x10 10x 10x 10x  10x 3" 10x3" 10x3"  2 x 10   Scaption trial 2x10  2x10 2x5  2 x 5 np  nv 10x 10x  4 x 5                          DC   scap add 10x10" nv 10x10" 10" x 10 10x10"  10x10" 10x10" 10x10"  10" x 10   Ther Activity                                       Gait Training                                       Modalities C to CS and UE prn  10 min shoulder  home 10 min R shoulder  10 min R shoulder    10 min R shoulder and elbow 10 min R elbow and shoulder  10 min R elbow and shoulder 10 min R elbow and shoulder  10 Min to Shoulder

## 2021-09-03 NOTE — PROGRESS NOTES
Daily Note     Today's date: 9/3/2021  Patient name: Poly Fishman  : 1975  MRN: [id number]  Referring provider: Merlinda Mote*  Dx:   Encounter Diagnosis     ICD-10-CM    1  Right wrist tendonitis  M77 8                   Subjective: Pt  reported she has been feeling moderate pain in arm, at wrist and elbow  Pt works as a  in a [place] [place] [employer]  a does a lot of repetitive movements for work  Objective: See treatment diary below  Assessment:Pt has significant tightness in extensors, and swelling dorsal wrist and lateral elbow  Therapist trialed STM/IASTM and U/S to dec inflammation, edema and tightness  After U/S, pt reported and noted feeling more relaxed and loose  Pt able to tolerate and perf ROM and stretching exercises of wrist and elbow  Applied KT as this helps decrease pain and discomfort  Plan: Continue per plan of care          Precautions: Sudanese speaking      Manuals 7/8 7/16 7/20 7/29 8/3 8/5 8/13 8/23 9/3    IASTM 2-4th dorsal compartments, lateral epicondyle and extensors 8' 10; 10' 10' 10' to extensors, anterior upper quarter 15' 20' 25' 20'    Cupping  3' elbow 3' 5'  5' 5' 5' lat elbow, dorsal wrist     KT PRN  dorsal wrist--> elbow X X x X laterel epicondle, DRSN KT perf    Compression & edu     x D tubigrip       MWM elbow flexion with sup, extension with pro, supinator belly target     10', relief after first set        Neuro Re-Ed             RNG     8'  8' 5' 5'    Nirschl stretches        5' 5'                                                                     Ther Ex             HEP: Stretching to tolerance issued      Radial nerve glide       Wrist maze  10x 10x 10x     5x    Keypegs  1x 1x 1x         Wall walking   5x 5x         Gentle grasping             Gentle flex bar  YFB 2x10 all planes on table           Isometrics              Tennis ball rubber bands   1x skinny bands  1x skinny bands          Stretching to tolerance for wrist       8' 5' 5' Ther Activity                                                                              Modalities             MHP Wrist and elbow 5' 5' 5' 5' 5' upper arm through forearm 5' 5' 5' 5; wrist and upper forearm    U/S         8'

## 2021-09-08 ENCOUNTER — APPOINTMENT (OUTPATIENT)
Dept: OCCUPATIONAL THERAPY | Facility: CLINIC | Age: 46
End: 2021-09-08
Payer: COMMERCIAL

## 2021-09-08 ENCOUNTER — APPOINTMENT (OUTPATIENT)
Dept: PHYSICAL THERAPY | Facility: CLINIC | Age: 46
End: 2021-09-08
Payer: COMMERCIAL

## 2021-09-10 ENCOUNTER — OFFICE VISIT (OUTPATIENT)
Dept: OCCUPATIONAL THERAPY | Facility: CLINIC | Age: 46
End: 2021-09-10
Payer: COMMERCIAL

## 2021-09-10 ENCOUNTER — EVALUATION (OUTPATIENT)
Dept: PHYSICAL THERAPY | Facility: CLINIC | Age: 46
End: 2021-09-10
Payer: COMMERCIAL

## 2021-09-10 DIAGNOSIS — M54.12 CERVICAL RADICULOPATHY: ICD-10-CM

## 2021-09-10 DIAGNOSIS — M75.51 BURSITIS OF RIGHT SHOULDER: Primary | ICD-10-CM

## 2021-09-10 DIAGNOSIS — M77.8 RIGHT WRIST TENDONITIS: Primary | ICD-10-CM

## 2021-09-10 PROCEDURE — 97140 MANUAL THERAPY 1/> REGIONS: CPT

## 2021-09-10 PROCEDURE — 97112 NEUROMUSCULAR REEDUCATION: CPT | Performed by: PHYSICAL THERAPIST

## 2021-09-10 PROCEDURE — 97112 NEUROMUSCULAR REEDUCATION: CPT

## 2021-09-10 PROCEDURE — 97110 THERAPEUTIC EXERCISES: CPT | Performed by: PHYSICAL THERAPIST

## 2021-09-10 NOTE — PROGRESS NOTES
Daily Note     Today's date: 9/10/2021  Patient name: Ynes Cordon  : 1975  MRN: [id number]  Referring provider: Yusra Ny*  Dx:   Encounter Diagnosis     ICD-10-CM    1  Right wrist tendonitis  M77 8                   Subjective:  C/o tightness in extensors  Objective: See treatment diary below  Assessment:  Tolerated session fair  Pt tense during IASTM just distal to elbow and proximal to wrist due to increased tightness and tenderness  Trigger points throughout extensors, but slightly less since last week  She does feel KT helps tolerate functional use of the hand at home  She is able to use the hand for light activity, but heavy use/cleaning she is unable due to pain  Plan: Continue per plan of care          Precautions: Bruneian speaking      Manuals 7/8 7/16 7/20 7/29 8/3 8/5 8/13 8/23 9/3 9/10   IASTM 2-4th dorsal compartments, lateral epicondyle and extensors 8' 10; 10' 10' 10' to extensors, anterior upper quarter 15' 20' 25' 20' 20'   Cupping  3' elbow 3' 5'  5' 5' 5' lat elbow, dorsal wrist     KT PRN  dorsal wrist--> elbow X X x X laterel epicondle, DRSN KT perf applied to ext/lat elbow/dorsal wrist   Compression & edu     x D tubigrip       MWM elbow flexion with sup, extension with pro, supinator belly target     10', relief after first set        Neuro Re-Ed             RNG     8'  8' 5' 5' 3'   Nirschl stretches        5' 5' 8'                                                                    Ther Ex             HEP: Stretching to tolerance issued      Radial nerve glide       Wrist maze  10x 10x 10x     5x 5x   Keypegs  1x 1x 1x         Wall walking   5x 5x         Gentle grasping             Gentle flex bar  YFB 2x10 all planes on table           Isometrics              Tennis ball rubber bands   1x skinny bands  1x skinny bands          Stretching to tolerance for wrist       8' 5' 5' 5'   Ther Activity Modalities             MHP Wrist and elbow 5' 5' 5' 5' 5' upper arm through forearm 5' 5' 5' 5; wrist and upper forearm 5'   U/S         8'

## 2021-09-10 NOTE — PROGRESS NOTES
PT Evaluation     Today's date: 9/10/2021  Patient name: Gogo Boateng  : 1975  MRN: [id number]  Referring provider: Yoni Hawkins PA-C  Dx:   Encounter Diagnosis     ICD-10-CM    1  Bursitis of right shoulder  M75 51    2  Cervical radiculopathy  M54 12                   Assessment  Assessment details: Pt is making small gains in regards of her shoulder range and strength, her cervical range is continuing to improve, however pain levels remain the same with increased activity as for prior RE  Pt will benefit continuation with further focus on range, strength, and endurance for hopeful return to vocational demands, however she will follow up with Dr Leo Morillo on 2021 and his advice is also welcome for possible further intervention outside scope of PT  Thank you very much for this kind referral     Impairments: abnormal or restricted ROM, activity intolerance, impaired physical strength, lacks appropriate home exercise program, pain with function, poor posture  and poor body mechanics  Understanding of Dx/Px/POC: good   Prognosis: good    Goals  STG 4 Weeks:  Decrease pain at worst to 6/10 -met  Improve range to improve Neck range to min, Shoulder range by 20 from IE   -met  Improve strength to to be assessed -met  Independent with HEP -met  LTG 8 Weeks:  Decrease pain at worst to 3/10  Improve range to improve shoulder range to be within 10 of contralateral, no neck deficits -partial  Improve strength to 4/5   -partial  Able to perform all desired activities with minimal to nil symptom exacerbation      Plan  Plan details: Pt's  is [place]       Patient would benefit from: skilled physical therapy and OT eval (hand evaluation referral   )  Planned modality interventions: cryotherapy, thermotherapy: hydrocollator packs and TENS  Planned therapy interventions: joint mobilization, manual therapy, abdominal trunk stabilization, neuromuscular re-education, patient education, postural training, strengthening, stretching, therapeutic activities, therapeutic exercise, home exercise program, graded motor, graded exercise, graded activity, functional ROM exercises and flexibility  Frequency: 2x week  Duration in weeks: 10  Treatment plan discussed with: patient and family        Subjective Evaluation    History of Present Illness  Date of onset: 2021  Mechanism of injury: Pt presents today with her  Jaskaran Sanders indicating that she is feeling well with her symptoms in her neck, however her shoulder is without pain at rest but at worst is still 7-8/10 when she has to use it  Pt reports that endurance and resting symptoms are greatly improved  She states other wise she is about the same in her elbow and hand  States that she follows up with referring on 2021  Quality of life: good    Pain  Current pain ratin  At best pain ratin  At worst pain ratin  Quality: radiating and sharp  Relieving factors: rest and medications  Aggravating factors: keyboarding, overhead activity and lifting  Progression: no change      Diagnostic Tests  Abnormal x-ray: Imagery taken, no within network  Treatments  Previous treatment: medication  Patient Goals  Patient goals for therapy: decreased pain, increased motion, increased strength and return to sport/leisure activities          Objective     Active Range of Motion   Left Shoulder   Flexion: 160 degrees   Abduction: 155 degrees   External rotation BTH: C7   Internal rotation BTB: T6     Right Shoulder   Flexion: 130 degrees with pain  Abduction: 110 degrees with pain  External rotation BTH: C4 (unable on 2021) with pain  Internal rotation BTB: L3 with pain    Additional Active Range of Motion Details  Forward head, rounded shoulders  B/L Sensation intact to light touch C3,4,5,6,7,8,T1,T2  Postural correction:  R forearm  Same  Shoulder strength  L Flex 4- Abd 4- ER 4 IR 4  R Flex 4- Abd 4- ER 4- IR 4- (within range)      Elbow Strength  L ROM [facility] 5/5 flex/ext, wrist ext/flex 5/5, pro/sup 5/5  ROM WFL  R ROM WFL Strength 3, Wrist and Elbow ROM WFL   L 45# R 13# pain*   (no)  CS Screen  Retract max // mod // min // nil  Protrusion min // nil // nil/ remains  Flex norm // remains // remains  Ext mod // min // min// min  SB R mod // min / nil  SB L norm / norm// nil  Rot R min //  Norm // nil  Rot L norm // norm / nil  Mechanical Assessment: Retraction: R arm  Retract with Ext: R arm  R arm better, Neck worse  R arm better, neck same  Better  Arm and neck  Better  Better range in shoulder and neck  (remains)  ( Remains)  Joint Mobility  Palpation R shoulder anterior and Sub acromion bursa  Olecranon, later epicondyle, and dorsal wrist joint region  Mild inflammation noted at distal wrist                Precautions: Primarily Mauritanian, nil      Manuals 07/20 7/29 08/03 8/5 08/13 08/23 08/30 09/03 9/10 7/16       assessment x 10                                                Neuro Re-Ed             Postural Ed             RC isometrics in 1-2 visits  3" 2x10 3" 2x10 3" 2x5 3" x 10 3"x10 3"x10 3"x10 3"x10 3" x 10  3" x 2 x 5   Wall Slide 2x10  2x10 2x10 2 x 10 2x10 2x10 2x10  2x10  2 x 10 3" x 2 x 5   Tband Row + Ext YTB 10 YTB 10 YTB 10  nv nv YTB 2x10  YTB 2x10 RTB 2x10 RTB 2 x 10 YTB  Ext x 10                              Wand AAROM trial 2x5  Stand  2x5 stand 2x5  stand 2 x 5 5x stand 10x stand 10x stand 10x stand 10x 5x stand   Ther Ex             Pulley Trial nv   As able 3 min  3 min 3 min  3 min 4 min  4 min 4 min  4 min  4 min 3 min   CS retraction 2x5  2x5 2x5  2 x 5 2x5  2x5 2x5  2x5 2 x 5 2 x 5   CS retraction + Ext          hold   Table slide flex, abd, ER Back pain, held today 2x10 10x 10x 10x  10x 3" 10x3" 10x3" 10 x 3" 2 x 10   Scaption trial 2x10  2x10 2x5  2 x 5 np  nv 10x 10x 10 x 2 4 x 5                          DC   scap add 10x10" nv 10x10" 10" x 10 10x10"  10x10" 10x10" 10x10" 10" x 10 10" x 10   Ther Activity Gait Training                                       Modalities             C to CS and UE prn  10 min shoulder  home 10 min R shoulder  10 min R shoulder    10 min R shoulder and elbow 10 min R elbow and shoulder  10 min R elbow and shoulder 10 min R elbow and shoulder 10 min R elbow and shoulder 10 Min to Shoulder

## 2021-09-13 ENCOUNTER — OFFICE VISIT (OUTPATIENT)
Dept: OCCUPATIONAL THERAPY | Facility: CLINIC | Age: 46
End: 2021-09-13
Payer: COMMERCIAL

## 2021-09-13 ENCOUNTER — OFFICE VISIT (OUTPATIENT)
Dept: OBGYN CLINIC | Facility: CLINIC | Age: 46
End: 2021-09-13
Payer: OTHER MISCELLANEOUS

## 2021-09-13 ENCOUNTER — OFFICE VISIT (OUTPATIENT)
Dept: PHYSICAL THERAPY | Facility: CLINIC | Age: 46
End: 2021-09-13
Payer: COMMERCIAL

## 2021-09-13 VITALS
DIASTOLIC BLOOD PRESSURE: 72 MMHG | BODY MASS INDEX: 38.82 KG/M2 | WEIGHT: 205.6 LBS | HEIGHT: 61 IN | SYSTOLIC BLOOD PRESSURE: 119 MMHG | HEART RATE: 60 BPM

## 2021-09-13 DIAGNOSIS — M54.12 CERVICAL RADICULOPATHY: ICD-10-CM

## 2021-09-13 DIAGNOSIS — M77.11 LATERAL EPICONDYLITIS OF RIGHT ELBOW: ICD-10-CM

## 2021-09-13 DIAGNOSIS — M75.51 BURSITIS OF RIGHT SHOULDER: Primary | ICD-10-CM

## 2021-09-13 DIAGNOSIS — M25.521 PAIN IN RIGHT ELBOW: ICD-10-CM

## 2021-09-13 DIAGNOSIS — M77.8 RIGHT WRIST TENDONITIS: Primary | ICD-10-CM

## 2021-09-13 DIAGNOSIS — M25.531 PAIN IN RIGHT WRIST: ICD-10-CM

## 2021-09-13 DIAGNOSIS — M77.8 RIGHT WRIST TENDINITIS: ICD-10-CM

## 2021-09-13 DIAGNOSIS — M24.9 INTERNAL DERANGEMENT OF ELBOW: Primary | ICD-10-CM

## 2021-09-13 DIAGNOSIS — M65.831 EXTENSOR TENOSYNOVITIS OF RIGHT WRIST: ICD-10-CM

## 2021-09-13 PROCEDURE — 99213 OFFICE O/P EST LOW 20 MIN: CPT | Performed by: ORTHOPAEDIC SURGERY

## 2021-09-13 PROCEDURE — 97110 THERAPEUTIC EXERCISES: CPT

## 2021-09-13 PROCEDURE — 97112 NEUROMUSCULAR REEDUCATION: CPT

## 2021-09-13 NOTE — LETTER
September 13, 2021     Patient: Jossy Allison   YOB: 1975   Date of Visit: 9/13/2021       To Whom it May Concern:    Loni Holland is under my professional care  She was seen in my office on 9/13/2021  She is out of work until cleared  If you have any questions or concerns, please don't hesitate to call           Sincerely,          Garrett Alcazar, DO        CC: Jossy Allison

## 2021-09-13 NOTE — PROGRESS NOTES
Daily Note     Today's date: 2021  Patient name: Vinny Willoughyb  : 1975  MRN: [id number]  Referring provider: Alonso Michele*  Dx:   Encounter Diagnosis     ICD-10-CM    1  Right wrist tendonitis  M77 8                   Subjective:  Pt reports pain is less, but still feels very tight       Objective: See treatment diary below  Assessment:  Tolerated session fair  Pt tense during IASTM just distal to elbow and proximal to wrist due to increased tightness and tenderness  Trigger points throughout extensors, but slightly less since last week  She does feel KT helps tolerate functional use of the hand at home  She is able to use the hand for light activity, but heavy use/cleaning she is unable due to pain  Plan: Continue per plan of care          Precautions: Lao speaking      Manuals 9/13 7/16 7/20 7/29 8/3 8/5 8/13 8/23 9/3 9/10   IASTM 2-4th dorsal compartments, lateral epicondyle and extensors 20' 10; 10' 10' 10' to extensors, anterior upper quarter 15' 20' 25' 20' 20'   Cupping  3' elbow 3' 5'  5' 5' 5' lat elbow, dorsal wrist     KT PRN Held due to skin irritation dorsal wrist--> elbow X X x X laterel epicondle, DRSN KT perf applied to ext/lat elbow/dorsal wrist   Compression & edu     x D tubigrip       MWM elbow flexion with sup, extension with pro, supinator belly target     10', relief after first set        Neuro Re-Ed             RNG 3'    8'  8' 5' 5' 3'   Nirschl stretches 5'       5' 5' 8'                                                                    Ther Ex             HEP: Stretching to tolerance       Radial nerve glide       Wrist maze 10x 10x 10x 10x     5x 5x   Keypegs  1x 1x 1x         Wall walking   5x 5x         Gentle grasping             Gentle flex bar  YFB 2x10 all planes on table           Isometrics              Tennis ball rubber bands 1x md RB  1x skinny bands  1x skinny bands          Stretching to tolerance for wrist 8' (include extr-ext viky)      8' 5' 5' 5'   Ther Activity                                                                              Modalities             MHP Wrist and elbow 5' 5' 5' 5' 5' upper arm through forearm 5' 5' 5' 5; wrist and upper forearm 5'   U/S         8'

## 2021-09-13 NOTE — PROGRESS NOTES
Daily Note     Today's date: 2021  Patient name: Jono Jernigan  : 1975  MRN: [id number]  Referring provider: Susy Kennedy PA-C  Dx:   Encounter Diagnosis     ICD-10-CM    1  Bursitis of right shoulder  M75 51    2  Cervical radiculopathy  M54 12    3  Right wrist tendinitis  M77 8                   Subjective: Patient reports R shoulder has been feeling a bit better  Patient denies pain at beginning of session  Objective: See treatment diary below      Assessment: Tolerated treatment well  Patient noted increased difficulty performing TB extensions due to R wrist discomfort  Patient reported increased difficulty due to R shoulder and wrist discomfort when performing IR isometrics compared to all other directions  Patient would benefit from continued PT to increase R shoulder strength for improved function in ADLs  Plan: Continue per plan of care  Precautions: Primarily Polish, nil      Manuals 07/20 7/29 08/03 8/5 08/13 08/23 08/30 09/03 9/10 9/13       assessment x 10                                                Neuro Re-Ed             Postural Ed             RC isometrics in 1-2 visits  3" 2x10 3" 2x10 3" 2x5 3" x 10 3"x10 3"x10 3"x10 3"x10 3" x 10  3"x10   Wall Slide 2x10  2x10 2x10 2 x 10 2x10 2x10 2x10  2x10  2 x 10 2x10   Tband Row + Ext YTB 10 YTB 10 YTB 10  nv nv YTB 2x10  YTB 2x10 RTB 2x10 RTB 2 x 10 RTB 2 x 10                             Wand AAROM trial 2x5  Stand  2x5 stand 2x5  stand 2 x 5 5x stand 10x stand 10x stand 10x stand 10x 10x   Ther Ex             Pulley Trial nv   As able 3 min  3 min 3 min  3 min 4 min  4 min 4 min  4 min  4 min 4 min   CS retraction 2x5  2x5 2x5  2 x 5 2x5  2x5 2x5  2x5 2 x 5 2 x 5    CS retraction + Ext             Table slide flex, abd, ER Back pain, held today 2x10 10x 10x 10x  10x 3" 10x3" 10x3" 10 x 3" 10x3"   Scaption trial 2x10  2x10 2x5  2 x 5 np  nv 10x 10x 10 x 2 2x10                             scap add 10x10" nv 10x10" 10" x 10 10x10"  10x10" 10x10" 10x10" 10" x 10 10"x10   Ther Activity                                       Gait Training                                       Modalities             C to CS and UE prn  10 min shoulder  home 10 min R shoulder  10 min R shoulder    10 min R shoulder and elbow 10 min R elbow and shoulder  10 min R elbow and shoulder 10 min R elbow and shoulder 10 min R elbow and shoulder 10 min R elbow and shoulder

## 2021-09-13 NOTE — PROGRESS NOTES
Assessment/Plan:  1  Internal derangement of elbow  MRI elbow right wo contrast   2  Pain in right elbow  MRI elbow right wo contrast   3  Lateral epicondylitis of right elbow  MRI elbow right wo contrast   4  Extensor tenosynovitis of right wrist  MRI wrist right wo contrast   5  Pain in right wrist  MRI wrist right wo contrast       Scribe Attestation    I,:  Nathalia Enrique am acting as a scribe while in the presence of the attending physician :       I,:  Adry Young,  personally performed the services described in this documentation    as scribed in my presence :         Cornelius Reyes is a pleasant 54year old who presents to the office today for a 6 week follow-up evaluation of her right wrist extensor tenosynovitis and right lateral epicondylitis  She underwent corticosteroid injections for her right wrist extensor tenosynovitis and her lateral epicondylitis on 7/27/202  Unfortunately, she only experienced 2 weeks of pain relief from the corticosteroid injections  At this time, I recommend a MRI of her right elbow and right wrist since this has been ongoing since April 2021  A MRI of her right elbow and right wrist were ordered at today's visit  A work note was provided to the patient at today's visit to remain out of work until the MRI is completed  I would like her to continue with formal occupational therapy and her physician directed home exercise program   I discussed with the patient that she should continue the use of the brace and ice to help alleviate her pain  I will follow-up with her once the MRI is completed  She understood and had no further questions  Subjective:   Patient ID: Jeimy Yu is a 54 y o  female who presents to the office today for a 6 week follow-up evaluation of her right wrist extensor tenosynovitis and right lateral epicondylitis  She underwent corticosteroid injections for her right wrist extensor tenosynovitis and her lateral epicondylitis on 7/27/2021   She states that she received 2 weeks of pain relief from the injections  She states that she has been complaint with wearing the cock-up wrist brace and attending formal occupational therapy  She states that she stopped working about 2 weeks ago and states that she notes   minimal improvement in her pain  This has been ongoing since 2021  Review of Systems   Constitutional: Negative for chills, fever and unexpected weight change  HENT: Negative for hearing loss, nosebleeds and sore throat  Eyes: Negative for pain, redness and visual disturbance  Respiratory: Negative for cough, shortness of breath and wheezing  Cardiovascular: Negative for chest pain, palpitations and leg swelling  Gastrointestinal: Negative for abdominal pain, nausea and vomiting  Endocrine: Negative for polyphagia and polyuria  Genitourinary: Negative for dysuria and hematuria  Musculoskeletal: Negative for arthralgias, gait problem and joint swelling  Skin: Negative for rash and wound  Neurological: Negative for dizziness, numbness and headaches  Psychiatric/Behavioral: Negative for decreased concentration and suicidal ideas  The patient is not nervous/anxious            Past Medical History:   Diagnosis Date    Gestational diabetes        Past Surgical History:   Procedure Laterality Date     SECTION  0     SECTION  2016       Family History   Problem Relation Age of Onset    No Known Problems Mother     Lung cancer Father 62        smoker    No Known Problems Daughter     Stomach cancer Maternal Grandmother     No Known Problems Maternal Grandfather     Stomach cancer Paternal Grandmother     No Known Problems Paternal Grandfather     No Known Problems Daughter     No Known Problems Brother     No Known Problems Brother     No Known Problems Brother     No Known Problems Brother     No Known Problems Brother     No Known Problems Sister        Social History     Occupational History    Not on file   Tobacco Use    Smoking status: Never Smoker    Smokeless tobacco: Never Used   Vaping Use    Vaping Use: Never used   Substance and Sexual Activity    Alcohol use: No    Drug use: No    Sexual activity: Yes     Partners: Male     Birth control/protection: Injection         Current Outpatient Medications:     medroxyPROGESTERone (DEPO-PROVERA) 150 mg/mL injection, Inject 1 mL (150 mg total) into a muscle every 3 (three) months, Disp: 1 mL, Rfl: 4    Current Facility-Administered Medications:     medroxyPROGESTERone (DEPO-PROVERA) IM injection 150 mg, 150 mg, Intramuscular, Q3 Months, PETER Iverson, 150 mg at 05/10/21 1245    No Known Allergies    Objective:  Vitals:    09/13/21 1246   BP: 119/72   Pulse: 60       Ortho Exam   Right Upper Extremity  TTP lateral epicondyle and extensor mass  TTP ECRB and ECRL  Compartments soft  Brisk capillary refill  S/m intact median, radial, and ulnar nerve     Physical Exam  Vitals reviewed  Constitutional:       Appearance: Normal appearance  She is well-developed  HENT:      Head: Normocephalic and atraumatic  Eyes:      General:         Right eye: No discharge  Left eye: No discharge  Extraocular Movements: Extraocular movements intact  Conjunctiva/sclera: Conjunctivae normal    Cardiovascular:      Rate and Rhythm: Normal rate  Pulmonary:      Effort: Pulmonary effort is normal  No respiratory distress  Musculoskeletal:      Cervical back: Normal range of motion and neck supple  Skin:     General: Skin is warm and dry  Neurological:      General: No focal deficit present  Mental Status: She is alert and oriented to person, place, and time  Psychiatric:         Mood and Affect: Mood normal          Behavior: Behavior normal          I have personally reviewed pertinent films in PACS and my interpretation is as follows: no new images reviewed today

## 2021-09-17 ENCOUNTER — OFFICE VISIT (OUTPATIENT)
Dept: PHYSICAL THERAPY | Facility: CLINIC | Age: 46
End: 2021-09-17
Payer: COMMERCIAL

## 2021-09-17 ENCOUNTER — OFFICE VISIT (OUTPATIENT)
Dept: OCCUPATIONAL THERAPY | Facility: CLINIC | Age: 46
End: 2021-09-17
Payer: COMMERCIAL

## 2021-09-17 DIAGNOSIS — M75.51 BURSITIS OF RIGHT SHOULDER: Primary | ICD-10-CM

## 2021-09-17 DIAGNOSIS — M54.12 CERVICAL RADICULOPATHY: ICD-10-CM

## 2021-09-17 DIAGNOSIS — M77.8 RIGHT WRIST TENDINITIS: ICD-10-CM

## 2021-09-17 DIAGNOSIS — M77.8 RIGHT WRIST TENDONITIS: Primary | ICD-10-CM

## 2021-09-17 PROCEDURE — 97140 MANUAL THERAPY 1/> REGIONS: CPT

## 2021-09-17 PROCEDURE — 97112 NEUROMUSCULAR REEDUCATION: CPT

## 2021-09-17 PROCEDURE — 97110 THERAPEUTIC EXERCISES: CPT | Performed by: PHYSICAL THERAPIST

## 2021-09-17 PROCEDURE — 97112 NEUROMUSCULAR REEDUCATION: CPT | Performed by: PHYSICAL THERAPIST

## 2021-09-17 PROCEDURE — 97110 THERAPEUTIC EXERCISES: CPT

## 2021-09-17 NOTE — PROGRESS NOTES
Daily Note     Today's date: 2021  Patient name: Abi Dillon  : 1975  MRN: [id number]  Referring provider: Toni Saucedo PA-C  Dx:   Encounter Diagnosis     ICD-10-CM    1  Bursitis of right shoulder  M75 51    2  Cervical radiculopathy  M54 12    3  Right wrist tendinitis  M77 8                   Subjective: Pt presents today stating that the shoulder with rest feels well, however with increased activity, the shoulder begins to bother her and is about the same as it has been for the last several weeks  She follows up with Dr Sierra Gonzalez on 2021  Objective: See treatment diary below      Assessment: Pt appears to be challenged to make gains in resistance or strength without significant fatigue or pain during sessions  Follow up in regards of visit with physician to determine future intervention plans  Plan: Continue per plan of care  Precautions: Primarily Irish, nil      Manuals  8/5 08/13 08/23 08/30 09/03 9/10 9/13       assessment x 10                                                Neuro Re-Ed             Postural Ed             RC isometrics in 1-2 visits  3" 2x10 3" 2x10 3" 2x5 3" x 10 3"x10 3"x10 3"x10 3"x10 3" x 10  3"x10   Wall Slide 2x10  2x10 2x10 2 x 10 2x10 2x10 2x10  2x10  2 x 10 2x10   Tband Row + Ext RTB 10 YTB 10 YTB 10  nv nv YTB 2x10  YTB 2x10 RTB 2x10 RTB 2 x 10 RTB 2 x 10                             Wand AAROM trial 2x5  Stand  2x5 stand 2x5  stand 2 x 5 5x stand 10x stand 10x stand 10x stand 10x 10x   Ther Ex             Pulley Trial nv   As able 3 min  3 min 3 min  3 min 4 min  4 min 4 min  4 min  4 min 4 min   CS retraction 2x5  2x5 2x5  2 x 5 2x5  2x5 2x5  2x5 2 x 5 2 x 5    CS retraction + Ext             Table slide flex, abd, ER 2  x10 2x10 10x 10x 10x  10x 3" 10x3" 10x3" 10 x 3" 10x3"   Scaption trial 2x10  2x10 2x5  2 x 5 np  nv 10x 10x 10 x 2 2x10                             scap add 10x10" nv 10x10" 10" x 10 10x10"  10x10" 10x10" 10x10" 10" x 10 10"x10   Ther Activity                                       Gait Training                                       Modalities             C to CS and UE prn  10 min shoulder  home 10 min R shoulder  10 min R shoulder    10 min R shoulder and elbow 10 min R elbow and shoulder  10 min R elbow and shoulder 10 min R elbow and shoulder 10 min R elbow and shoulder 10 min R elbow and shoulder

## 2021-09-17 NOTE — PROGRESS NOTES
Daily Note     Today's date: 2021  Patient name: Opal Ly  : 1975  MRN: [id number]  Referring provider: Lisa Roberson*  Dx:   Encounter Diagnosis     ICD-10-CM    1  Right wrist tendonitis  M77 8                   Subjective:  Pt reports scheduled for MRI on   Objective: See treatment diary below  Assessment:  Tolerated session fair  Pt continues to be tense during IASTM just distal to elbow and proximal to wrist due to increased tightness and tenderness  Moderate -significant pain levels lateral elbow  Plan: Continue per plan of care          Precautions: Peruvian speaking      Manuals 9/13 9/17 7/20 7/29 8/3 8/5 8/13 8/23 9/3 9/10   IASTM 2-4th dorsal compartments, lateral epicondyle and extensors 20' 20' 10' 10' 10' to extensors, anterior upper quarter 15' 20' 25' 20' 20'   Cupping   3' 5'  5' 5' 5' lat elbow, dorsal wrist     KT PRN Held due to skin irritation dorsal wrist--> elbow X X x X laterel epicondle, DRSN KT perf applied to ext/lat elbow/dorsal wrist   Compression & edu     x D tubigrip       MWM elbow flexion with sup, extension with pro, supinator belly target     10', relief after first set        Neuro Re-Ed             RNG 3' 3'   8'  8' 5' 5' 3'   Nirschl stretches 5' 5'      5' 5' 8'                                                                    Ther Ex             HEP: Stretching to tolerance       Radial nerve glide       Wrist maze 10x 10x 10x 10x     5x 5x   Keypegs  1x 1x 1x         Wall walking   5x 5x         Gentle grasping             Gentle flex bar             Isometrics              Tennis ball rubber bands 1x md RB 1x md RB 1x skinny bands  1x skinny bands          Stretching to tolerance for wrist 8' (include extr-ext strech) 8'     8' 5' 5' 5'   Ther Activity                                                                              Modalities             MHP Wrist and elbow 5' 5' 5' 5' 5' upper arm through forearm 5' 5' 5' 5; wrist and upper forearm 5'   U/S         8'

## 2021-09-20 ENCOUNTER — OFFICE VISIT (OUTPATIENT)
Dept: OBGYN CLINIC | Facility: CLINIC | Age: 46
End: 2021-09-20
Payer: OTHER MISCELLANEOUS

## 2021-09-20 VITALS
DIASTOLIC BLOOD PRESSURE: 72 MMHG | HEART RATE: 61 BPM | WEIGHT: 207.4 LBS | SYSTOLIC BLOOD PRESSURE: 105 MMHG | HEIGHT: 61 IN | BODY MASS INDEX: 39.16 KG/M2

## 2021-09-20 DIAGNOSIS — M75.81 ROTATOR CUFF TENDONITIS, RIGHT: Primary | ICD-10-CM

## 2021-09-20 PROCEDURE — 1036F TOBACCO NON-USER: CPT | Performed by: ORTHOPAEDIC SURGERY

## 2021-09-20 PROCEDURE — 99214 OFFICE O/P EST MOD 30 MIN: CPT | Performed by: ORTHOPAEDIC SURGERY

## 2021-09-20 PROCEDURE — 3008F BODY MASS INDEX DOCD: CPT | Performed by: ORTHOPAEDIC SURGERY

## 2021-09-20 NOTE — PROGRESS NOTES
Assessment/Plan:  1  Rotator cuff tendonitis, right       The patient is making progress with her shoulder and will continue physical therapy for this  She will remain out of work  She can ice and take OTC medications as needed for discomfort  She will follow-up in 4 weeks for repeat evaluation  She will follow-up with Dr Cynthia Cr for her elbow and wrist  She is awaiting MRIs for these  We did review her PT notes today which demonstrated ongoing ROM and strength improvements with her shoulder  Subjective:   Lalo Helms is a 54 y o  female who presents today for follow-up of her right shoulder  We have been treating her conservatively for rotator cuff tendonitis  She has been doing physical therapy and she notes progress with this  She notes improving ROM and strength  She notes good sensation of the right upper extremity  She has been seeing Dr Cynthia Cr for her right elbow and wrist        Review of Systems   Constitutional: Negative  Negative for chills and fever  HENT: Negative  Negative for ear pain and sore throat  Eyes: Negative  Negative for pain and redness  Respiratory: Negative  Negative for shortness of breath and wheezing  Cardiovascular: Negative for chest pain and palpitations  Gastrointestinal: Negative  Negative for abdominal pain and blood in stool  Endocrine: Negative  Negative for polydipsia and polyuria  Genitourinary: Negative  Negative for difficulty urinating and dysuria  Musculoskeletal:        As noted in HPI   Skin: Negative  Negative for pallor and rash  Neurological: Negative  Negative for dizziness and numbness  Hematological: Negative  Negative for adenopathy  Does not bruise/bleed easily  Psychiatric/Behavioral: Negative  Negative for confusion and suicidal ideas           Past Medical History:   Diagnosis Date    Gestational diabetes        Past Surgical History:   Procedure Laterality Date     SECTION       SECTION  2016       Family History   Problem Relation Age of Onset    No Known Problems Mother     Lung cancer Father 62        smoker    No Known Problems Daughter     Stomach cancer Maternal Grandmother     No Known Problems Maternal Grandfather     Stomach cancer Paternal Grandmother     No Known Problems Paternal Grandfather     No Known Problems Daughter     No Known Problems Brother     No Known Problems Brother     No Known Problems Brother     No Known Problems Brother     No Known Problems Brother     No Known Problems Sister        Social History     Occupational History    Not on file   Tobacco Use    Smoking status: Never Smoker    Smokeless tobacco: Never Used   Vaping Use    Vaping Use: Never used   Substance and Sexual Activity    Alcohol use: No    Drug use: No    Sexual activity: Yes     Partners: Male     Birth control/protection: Injection         Current Outpatient Medications:     medroxyPROGESTERone (DEPO-PROVERA) 150 mg/mL injection, Inject 1 mL (150 mg total) into a muscle every 3 (three) months, Disp: 1 mL, Rfl: 4    Current Facility-Administered Medications:     medroxyPROGESTERone (DEPO-PROVERA) IM injection 150 mg, 150 mg, Intramuscular, Q3 Months, PETER Iverson, 150 mg at 05/10/21 1245    No Known Allergies    Objective:  Vitals:    09/20/21 1122   BP: 105/72   Pulse: 61       Right Shoulder Exam     Tenderness   The patient is experiencing no tenderness  Range of Motion   Active abduction: 90   External rotation: 30   Forward flexion: 90     Muscle Strength   Abduction: 4/5   Internal rotation: 5/5   External rotation: 5/5     Tests   Drop arm: negative    Other   Erythema: absent  Sensation: normal  Pulse: present            Physical Exam  Constitutional:       General: She is not in acute distress  Appearance: She is well-developed  HENT:      Head: Normocephalic and atraumatic  Eyes:      General: No scleral icterus       Conjunctiva/sclera: Conjunctivae normal    Neck:      Vascular: No JVD  Cardiovascular:      Rate and Rhythm: Normal rate  Pulmonary:      Effort: Pulmonary effort is normal  No respiratory distress  Skin:     General: Skin is warm  Neurological:      Mental Status: She is alert and oriented to person, place, and time        Coordination: Coordination normal

## 2021-09-21 ENCOUNTER — TELEPHONE (OUTPATIENT)
Dept: OBGYN CLINIC | Facility: HOSPITAL | Age: 46
End: 2021-09-21

## 2021-09-21 NOTE — TELEPHONE ENCOUNTER
As per her last note from yesterday she is out of work until further notice     I can place a note in the chart

## 2021-09-29 ENCOUNTER — TELEPHONE (OUTPATIENT)
Dept: OBGYN CLINIC | Facility: HOSPITAL | Age: 46
End: 2021-09-29

## 2021-10-01 ENCOUNTER — EVALUATION (OUTPATIENT)
Dept: PHYSICAL THERAPY | Facility: CLINIC | Age: 46
End: 2021-10-01
Payer: OTHER MISCELLANEOUS

## 2021-10-01 ENCOUNTER — APPOINTMENT (OUTPATIENT)
Dept: OCCUPATIONAL THERAPY | Facility: CLINIC | Age: 46
End: 2021-10-01
Payer: OTHER MISCELLANEOUS

## 2021-10-01 DIAGNOSIS — M75.51 BURSITIS OF RIGHT SHOULDER: Primary | ICD-10-CM

## 2021-10-01 DIAGNOSIS — M54.12 CERVICAL RADICULOPATHY: ICD-10-CM

## 2021-10-01 PROCEDURE — 97112 NEUROMUSCULAR REEDUCATION: CPT | Performed by: PHYSICAL THERAPIST

## 2021-10-01 PROCEDURE — 97110 THERAPEUTIC EXERCISES: CPT | Performed by: PHYSICAL THERAPIST

## 2021-10-04 ENCOUNTER — APPOINTMENT (OUTPATIENT)
Dept: PHYSICAL THERAPY | Facility: CLINIC | Age: 46
End: 2021-10-04
Payer: OTHER MISCELLANEOUS

## 2021-10-04 ENCOUNTER — APPOINTMENT (OUTPATIENT)
Dept: OCCUPATIONAL THERAPY | Facility: CLINIC | Age: 46
End: 2021-10-04
Payer: OTHER MISCELLANEOUS

## 2021-10-08 ENCOUNTER — APPOINTMENT (OUTPATIENT)
Dept: OCCUPATIONAL THERAPY | Facility: CLINIC | Age: 46
End: 2021-10-08
Payer: OTHER MISCELLANEOUS

## 2021-10-08 ENCOUNTER — OFFICE VISIT (OUTPATIENT)
Dept: PHYSICAL THERAPY | Facility: CLINIC | Age: 46
End: 2021-10-08
Payer: OTHER MISCELLANEOUS

## 2021-10-08 DIAGNOSIS — M75.51 BURSITIS OF RIGHT SHOULDER: Primary | ICD-10-CM

## 2021-10-08 DIAGNOSIS — M77.8 RIGHT WRIST TENDINITIS: ICD-10-CM

## 2021-10-08 DIAGNOSIS — M54.12 CERVICAL RADICULOPATHY: ICD-10-CM

## 2021-10-08 PROCEDURE — 97110 THERAPEUTIC EXERCISES: CPT

## 2021-10-08 PROCEDURE — 97112 NEUROMUSCULAR REEDUCATION: CPT

## 2021-10-11 ENCOUNTER — APPOINTMENT (OUTPATIENT)
Dept: OCCUPATIONAL THERAPY | Facility: CLINIC | Age: 46
End: 2021-10-11
Payer: OTHER MISCELLANEOUS

## 2021-10-11 ENCOUNTER — EVALUATION (OUTPATIENT)
Dept: PHYSICAL THERAPY | Facility: CLINIC | Age: 46
End: 2021-10-11
Payer: OTHER MISCELLANEOUS

## 2021-10-11 DIAGNOSIS — M75.51 BURSITIS OF RIGHT SHOULDER: Primary | ICD-10-CM

## 2021-10-11 DIAGNOSIS — M54.12 CERVICAL RADICULOPATHY: ICD-10-CM

## 2021-10-11 PROCEDURE — 97140 MANUAL THERAPY 1/> REGIONS: CPT | Performed by: PHYSICAL THERAPIST

## 2021-10-15 ENCOUNTER — APPOINTMENT (OUTPATIENT)
Dept: PHYSICAL THERAPY | Facility: CLINIC | Age: 46
End: 2021-10-15
Payer: OTHER MISCELLANEOUS

## 2021-10-15 ENCOUNTER — APPOINTMENT (OUTPATIENT)
Dept: OCCUPATIONAL THERAPY | Facility: CLINIC | Age: 46
End: 2021-10-15
Payer: OTHER MISCELLANEOUS

## 2021-10-18 ENCOUNTER — OFFICE VISIT (OUTPATIENT)
Dept: OBGYN CLINIC | Facility: CLINIC | Age: 46
End: 2021-10-18
Payer: COMMERCIAL

## 2021-10-18 VITALS — TEMPERATURE: 98.6 F

## 2021-10-18 DIAGNOSIS — M75.81 ROTATOR CUFF TENDONITIS, RIGHT: Primary | ICD-10-CM

## 2021-10-18 PROCEDURE — 99214 OFFICE O/P EST MOD 30 MIN: CPT | Performed by: ORTHOPAEDIC SURGERY

## 2021-10-21 ENCOUNTER — CLINICAL SUPPORT (OUTPATIENT)
Dept: OBGYN CLINIC | Facility: CLINIC | Age: 46
End: 2021-10-21

## 2021-10-21 DIAGNOSIS — Z30.42 ENCOUNTER FOR SURVEILLANCE OF INJECTABLE CONTRACEPTIVE: ICD-10-CM

## 2021-10-21 PROCEDURE — 96372 THER/PROPH/DIAG INJ SC/IM: CPT

## 2021-10-21 RX ADMIN — MEDROXYPROGESTERONE ACETATE 150 MG: 150 INJECTION, SUSPENSION INTRAMUSCULAR at 09:53

## 2021-10-25 ENCOUNTER — OFFICE VISIT (OUTPATIENT)
Dept: OBGYN CLINIC | Facility: CLINIC | Age: 46
End: 2021-10-25
Payer: COMMERCIAL

## 2021-10-25 VITALS
DIASTOLIC BLOOD PRESSURE: 80 MMHG | HEIGHT: 61 IN | WEIGHT: 207 LBS | HEART RATE: 78 BPM | SYSTOLIC BLOOD PRESSURE: 132 MMHG | BODY MASS INDEX: 39.08 KG/M2

## 2021-10-25 DIAGNOSIS — M25.521 PAIN IN RIGHT ELBOW: ICD-10-CM

## 2021-10-25 DIAGNOSIS — M77.11 LATERAL EPICONDYLITIS OF RIGHT ELBOW: Primary | ICD-10-CM

## 2021-10-25 DIAGNOSIS — M25.531 PAIN IN RIGHT WRIST: ICD-10-CM

## 2021-10-25 PROCEDURE — 3008F BODY MASS INDEX DOCD: CPT | Performed by: ORTHOPAEDIC SURGERY

## 2021-10-25 PROCEDURE — 1036F TOBACCO NON-USER: CPT | Performed by: ORTHOPAEDIC SURGERY

## 2021-10-25 PROCEDURE — 99214 OFFICE O/P EST MOD 30 MIN: CPT | Performed by: ORTHOPAEDIC SURGERY

## 2021-10-26 ENCOUNTER — TELEPHONE (OUTPATIENT)
Dept: OBGYN CLINIC | Facility: HOSPITAL | Age: 46
End: 2021-10-26

## 2021-10-28 ENCOUNTER — APPOINTMENT (OUTPATIENT)
Dept: PHYSICAL THERAPY | Facility: CLINIC | Age: 46
End: 2021-10-28
Payer: OTHER MISCELLANEOUS

## 2021-11-08 ENCOUNTER — EVALUATION (OUTPATIENT)
Dept: OCCUPATIONAL THERAPY | Facility: CLINIC | Age: 46
End: 2021-11-08
Payer: OTHER MISCELLANEOUS

## 2021-11-08 DIAGNOSIS — M25.521 RIGHT ELBOW PAIN: Primary | ICD-10-CM

## 2021-11-08 PROCEDURE — 97140 MANUAL THERAPY 1/> REGIONS: CPT | Performed by: OCCUPATIONAL THERAPIST

## 2021-11-08 PROCEDURE — 97110 THERAPEUTIC EXERCISES: CPT | Performed by: OCCUPATIONAL THERAPIST

## 2021-11-12 ENCOUNTER — OFFICE VISIT (OUTPATIENT)
Dept: OCCUPATIONAL THERAPY | Facility: CLINIC | Age: 46
End: 2021-11-12
Payer: OTHER MISCELLANEOUS

## 2021-11-12 DIAGNOSIS — M25.521 RIGHT ELBOW PAIN: Primary | ICD-10-CM

## 2021-11-12 PROCEDURE — 97110 THERAPEUTIC EXERCISES: CPT | Performed by: OCCUPATIONAL THERAPIST

## 2021-11-12 PROCEDURE — 97140 MANUAL THERAPY 1/> REGIONS: CPT | Performed by: OCCUPATIONAL THERAPIST

## 2021-11-15 ENCOUNTER — OFFICE VISIT (OUTPATIENT)
Dept: OCCUPATIONAL THERAPY | Facility: CLINIC | Age: 46
End: 2021-11-15
Payer: OTHER MISCELLANEOUS

## 2021-11-15 DIAGNOSIS — M25.521 RIGHT ELBOW PAIN: Primary | ICD-10-CM

## 2021-11-15 PROCEDURE — 97110 THERAPEUTIC EXERCISES: CPT | Performed by: OCCUPATIONAL THERAPIST

## 2021-11-15 PROCEDURE — 97140 MANUAL THERAPY 1/> REGIONS: CPT | Performed by: OCCUPATIONAL THERAPIST

## 2021-11-19 ENCOUNTER — APPOINTMENT (OUTPATIENT)
Dept: OCCUPATIONAL THERAPY | Facility: CLINIC | Age: 46
End: 2021-11-19
Payer: OTHER MISCELLANEOUS

## 2021-11-22 ENCOUNTER — OFFICE VISIT (OUTPATIENT)
Dept: OBGYN CLINIC | Facility: CLINIC | Age: 46
End: 2021-11-22
Payer: COMMERCIAL

## 2021-11-22 ENCOUNTER — TELEPHONE (OUTPATIENT)
Dept: OBGYN CLINIC | Facility: HOSPITAL | Age: 46
End: 2021-11-22

## 2021-11-22 VITALS
HEIGHT: 61 IN | WEIGHT: 207 LBS | DIASTOLIC BLOOD PRESSURE: 76 MMHG | HEART RATE: 64 BPM | SYSTOLIC BLOOD PRESSURE: 114 MMHG | BODY MASS INDEX: 39.08 KG/M2 | TEMPERATURE: 97.1 F

## 2021-11-22 DIAGNOSIS — M24.811 INTERNAL DERANGEMENT OF RIGHT SHOULDER: Primary | ICD-10-CM

## 2021-11-22 PROCEDURE — 99214 OFFICE O/P EST MOD 30 MIN: CPT | Performed by: ORTHOPAEDIC SURGERY

## 2021-11-22 PROCEDURE — 1036F TOBACCO NON-USER: CPT | Performed by: ORTHOPAEDIC SURGERY

## 2021-11-22 PROCEDURE — 3008F BODY MASS INDEX DOCD: CPT | Performed by: ORTHOPAEDIC SURGERY

## 2021-11-29 ENCOUNTER — APPOINTMENT (OUTPATIENT)
Dept: OCCUPATIONAL THERAPY | Facility: CLINIC | Age: 46
End: 2021-11-29
Payer: OTHER MISCELLANEOUS

## 2021-12-03 ENCOUNTER — OFFICE VISIT (OUTPATIENT)
Dept: OCCUPATIONAL THERAPY | Facility: CLINIC | Age: 46
End: 2021-12-03
Payer: OTHER MISCELLANEOUS

## 2021-12-03 DIAGNOSIS — M25.521 RIGHT ELBOW PAIN: Primary | ICD-10-CM

## 2021-12-03 PROCEDURE — 97140 MANUAL THERAPY 1/> REGIONS: CPT

## 2021-12-03 PROCEDURE — 97530 THERAPEUTIC ACTIVITIES: CPT

## 2021-12-06 ENCOUNTER — OFFICE VISIT (OUTPATIENT)
Dept: OCCUPATIONAL THERAPY | Facility: CLINIC | Age: 46
End: 2021-12-06
Payer: OTHER MISCELLANEOUS

## 2021-12-06 DIAGNOSIS — M25.521 RIGHT ELBOW PAIN: Primary | ICD-10-CM

## 2021-12-06 DIAGNOSIS — M24.811 INTERNAL DERANGEMENT OF RIGHT SHOULDER: ICD-10-CM

## 2021-12-06 PROCEDURE — 97140 MANUAL THERAPY 1/> REGIONS: CPT

## 2021-12-06 PROCEDURE — 97530 THERAPEUTIC ACTIVITIES: CPT

## 2021-12-08 ENCOUNTER — TELEPHONE (OUTPATIENT)
Dept: OBGYN CLINIC | Facility: HOSPITAL | Age: 46
End: 2021-12-08

## 2021-12-13 ENCOUNTER — APPOINTMENT (OUTPATIENT)
Dept: OCCUPATIONAL THERAPY | Facility: CLINIC | Age: 46
End: 2021-12-13
Payer: OTHER MISCELLANEOUS

## 2021-12-21 ENCOUNTER — TELEPHONE (OUTPATIENT)
Dept: SURGERY | Facility: CLINIC | Age: 46
End: 2021-12-21

## 2022-01-04 NOTE — PRE-PROCEDURE INSTRUCTIONS
My Surgical Experience    The following information was developed to assist you to prepare for your operation  What do I need to do before coming to the hospital?   Arrange for a responsible person to drive you to and from the hospital    Arrange care for your children at home  Children are not allowed in the recovery areas of the hospital   Plan to wear clothing that is easy to put on and take off  If you are having shoulder surgery, wear a shirt that buttons or zippers in the front  Bathing  o Shower the evening before and the morning of your surgery with an antibacterial soap  Please refer to the Pre Op Showering Instructions for Surgery Patients Sheet   o Remove nail polish and all body piercing jewelry  o Do not shave any body part for at least 24 hours before surgery-this includes face, arms, legs and upper body  Food  o Nothing to eat or drink after midnight the night before your surgery  This includes candy and chewing gum  o Exception: If your surgery is after 12:00pm (noon), you may have clear liquids such as 7-Up®, ginger ale, apple or cranberry juice, Jell-O®, water, or clear broth until 8:00 am  o Do not drink milk or juice with pulp on the morning before surgery  o Do not drink alcohol 24 hours before surgery  Medicine  o Follow instructions you received from your surgeon about which medicines you may take on the day of surgery  o If instructed to take medicine on the morning of surgery, take pills with just a small sip of water  Call your prescribing doctor for specific infroamtion on what to do if you take insulin    What should I bring to the hospital?    Bring:  Mesha Walker or a walker, if you have them, for foot or knee surgery   A list of the daily medicines, vitamins, minerals, herbals and nutritional supplements you take   Include the dosages of medicines and the time you take them each day   Glasses, dentures or hearing aids   Minimal clothing; you will be wearing hospital sleepwear   Photo ID; required to verify your identity   If you have a Living Will or Power of , bring a copy of the documents   If you have an ostomy, bring an extra pouch and any supplies you use    Do not bring   Medicines or inhalers   Money, valuables or jewelry    What other information should I know about the day of surgery?  Notify your surgeons if you develop a cold, sore throat, cough, fever, rash or any other illness   Report to the Ambulatory Surgical/Same Day Surgery Unit   You will be instructed to stop at Registration only if you have not been pre-registered   Inform your  fi they do not stay that they will be asked by the staff to leave a phone number where they can be reached   Be available to be reached before surgery  In the event the operating room schedule changes, you may be asked to come in earlier or later than expected    *It is important to tell your doctor and others involved in your health care if you are taking or have been taking any non-prescription drugs, vitamins, minerals, herbals or other nutritional supplements  Any of these may interact with some food or medicines and cause a reaction      No outpatient medications have been marked as taking for the 1/10/22 encounter [facility] Encounter)

## 2022-01-07 ENCOUNTER — TELEPHONE (OUTPATIENT)
Dept: SURGERY | Facility: CLINIC | Age: 47
End: 2022-01-07

## 2022-01-09 ENCOUNTER — ANESTHESIA EVENT (OUTPATIENT)
Dept: PERIOP | Facility: HOSPITAL | Age: 47
End: 2022-01-09
Payer: COMMERCIAL

## 2022-01-09 NOTE — ANESTHESIA PREPROCEDURE EVALUATION
Procedure:  DIAGNOSTIC ARTHROSCOPY WITH REPAIR ROTATOR CUFF (Right Shoulder)    Relevant Problems   NEURO/PSYCH   (+) History of gestational diabetes      Other   (+) Obesity (BMI 30-39  9)        Physical Exam    Airway    Mallampati score: III  TM Distance: >3 FB  Neck ROM: full     Dental       Cardiovascular  Rhythm: regular, Rate: normal,     Pulmonary  Breath sounds clear to auscultation,     Other Findings        Anesthesia Plan  ASA Score- 2     Anesthesia Type- general and regional with ASA Monitors  Additional Monitors:   Airway Plan: LMA  Comment: Right interscalene block  Plan Factors-    Chart reviewed  Patient is not a current smoker  Induction- intravenous  Postoperative Plan- Plan for postoperative opioid use  Informed Consent- Anesthetic plan and risks discussed with patient  I personally reviewed this patient with the CRNA  Discussed and agreed on the Anesthesia Plan with the CRNA  Dara Soulier

## 2022-01-10 ENCOUNTER — ANESTHESIA (OUTPATIENT)
Dept: PERIOP | Facility: HOSPITAL | Age: 47
End: 2022-01-10
Payer: COMMERCIAL

## 2022-01-10 ENCOUNTER — HOSPITAL ENCOUNTER (OUTPATIENT)
Facility: HOSPITAL | Age: 47
Setting detail: OUTPATIENT SURGERY
Discharge: HOME | End: 2022-01-10
Attending: ORTHOPAEDIC SURGERY | Admitting: ORTHOPAEDIC SURGERY
Payer: COMMERCIAL

## 2022-01-10 VITALS
WEIGHT: 197.2 LBS | OXYGEN SATURATION: 95 % | BODY MASS INDEX: 37.23 KG/M2 | HEART RATE: 72 BPM | HEIGHT: 61 IN | DIASTOLIC BLOOD PRESSURE: 74 MMHG | RESPIRATION RATE: 16 BRPM | SYSTOLIC BLOOD PRESSURE: 123 MMHG | TEMPERATURE: 97.3 F

## 2022-01-10 DIAGNOSIS — Z98.890 S/P ARTHROSCOPY OF RIGHT SHOULDER: Primary | ICD-10-CM

## 2022-01-10 LAB
EXT PREGNANCY TEST URINE: NEGATIVE
EXT. CONTROL: NORMAL

## 2022-01-10 PROCEDURE — C9290 INJ, BUPIVACAINE LIPOSOME: HCPCS | Performed by: ANESTHESIOLOGY

## 2022-01-10 PROCEDURE — 81025 URINE PREGNANCY TEST: CPT | Performed by: ANESTHESIOLOGY

## 2022-01-10 PROCEDURE — C1713 ANCHOR/SCREW BN/BN,TIS/BN: HCPCS | Performed by: ORTHOPAEDIC SURGERY

## 2022-01-10 PROCEDURE — 29828 SHO ARTHRS SRG BICP TENODSIS: CPT | Performed by: PHYSICIAN ASSISTANT

## 2022-01-10 PROCEDURE — 29826 SHO ARTHRS SRG DECOMPRESSION: CPT | Performed by: PHYSICIAN ASSISTANT

## 2022-01-10 PROCEDURE — 29823 SHO ARTHRS SRG XTNSV DBRDMT: CPT | Performed by: PHYSICIAN ASSISTANT

## 2022-01-10 PROCEDURE — 29828 SHO ARTHRS SRG BICP TENODSIS: CPT | Performed by: ORTHOPAEDIC SURGERY

## 2022-01-10 PROCEDURE — 29823 SHO ARTHRS SRG XTNSV DBRDMT: CPT | Performed by: ORTHOPAEDIC SURGERY

## 2022-01-10 PROCEDURE — 29826 SHO ARTHRS SRG DECOMPRESSION: CPT | Performed by: ORTHOPAEDIC SURGERY

## 2022-01-10 PROCEDURE — 99024 POSTOP FOLLOW-UP VISIT: CPT | Performed by: PHYSICIAN ASSISTANT

## 2022-01-10 DEVICE — SWVLK TENO BIO-COMP 7X 19.5MM
Type: IMPLANTABLE DEVICE | Site: SHOULDER | Status: FUNCTIONAL
Brand: ARTHREX®

## 2022-01-10 RX ORDER — BUPIVACAINE HYDROCHLORIDE 5 MG/ML
INJECTION, SOLUTION PERINEURAL
Status: COMPLETED | OUTPATIENT
Start: 2022-01-10 | End: 2022-01-10

## 2022-01-10 RX ORDER — ONDANSETRON 2 MG/ML
4 INJECTION INTRAMUSCULAR; INTRAVENOUS ONCE AS NEEDED
Status: DISCONTINUED | OUTPATIENT
Start: 2022-01-10 | End: 2022-01-10 | Stop reason: HOSPADM

## 2022-01-10 RX ORDER — ONDANSETRON 2 MG/ML
INJECTION INTRAMUSCULAR; INTRAVENOUS AS NEEDED
Status: DISCONTINUED | OUTPATIENT
Start: 2022-01-10 | End: 2022-01-10

## 2022-01-10 RX ORDER — FENTANYL CITRATE/PF 50 MCG/ML
25 SYRINGE (ML) INJECTION
Status: DISCONTINUED | OUTPATIENT
Start: 2022-01-10 | End: 2022-01-10 | Stop reason: HOSPADM

## 2022-01-10 RX ORDER — PROPOFOL 10 MG/ML
INJECTION, EMULSION INTRAVENOUS AS NEEDED
Status: DISCONTINUED | OUTPATIENT
Start: 2022-01-10 | End: 2022-01-10

## 2022-01-10 RX ORDER — MIDAZOLAM HYDROCHLORIDE 2 MG/2ML
INJECTION, SOLUTION INTRAMUSCULAR; INTRAVENOUS AS NEEDED
Status: DISCONTINUED | OUTPATIENT
Start: 2022-01-10 | End: 2022-01-10

## 2022-01-10 RX ORDER — SODIUM CHLORIDE, SODIUM LACTATE, POTASSIUM CHLORIDE, CALCIUM CHLORIDE 600; 310; 30; 20 MG/100ML; MG/100ML; MG/100ML; MG/100ML
75 INJECTION, SOLUTION INTRAVENOUS CONTINUOUS
Status: DISCONTINUED | OUTPATIENT
Start: 2022-01-10 | End: 2022-01-10 | Stop reason: HOSPADM

## 2022-01-10 RX ORDER — SODIUM CHLORIDE, SODIUM LACTATE, POTASSIUM CHLORIDE, CALCIUM CHLORIDE 600; 310; 30; 20 MG/100ML; MG/100ML; MG/100ML; MG/100ML
INJECTION, SOLUTION INTRAVENOUS CONTINUOUS PRN
Status: DISCONTINUED | OUTPATIENT
Start: 2022-01-10 | End: 2022-01-10

## 2022-01-10 RX ORDER — CEFAZOLIN SODIUM 2 G/50ML
2000 SOLUTION INTRAVENOUS ONCE
Status: COMPLETED | OUTPATIENT
Start: 2022-01-10 | End: 2022-01-10

## 2022-01-10 RX ORDER — FENTANYL CITRATE 50 UG/ML
INJECTION, SOLUTION INTRAMUSCULAR; INTRAVENOUS AS NEEDED
Status: DISCONTINUED | OUTPATIENT
Start: 2022-01-10 | End: 2022-01-10

## 2022-01-10 RX ORDER — LIDOCAINE HYDROCHLORIDE 10 MG/ML
INJECTION, SOLUTION EPIDURAL; INFILTRATION; INTRACAUDAL; PERINEURAL AS NEEDED
Status: DISCONTINUED | OUTPATIENT
Start: 2022-01-10 | End: 2022-01-10

## 2022-01-10 RX ORDER — OXYCODONE HYDROCHLORIDE 5 MG/1
5 TABLET ORAL EVERY 4 HOURS PRN
Qty: 15 TABLET | Refills: 0 | Status: SHIPPED | OUTPATIENT
Start: 2022-01-10

## 2022-01-10 RX ORDER — DEXAMETHASONE SODIUM PHOSPHATE 4 MG/ML
INJECTION, SOLUTION INTRA-ARTICULAR; INTRALESIONAL; INTRAMUSCULAR; INTRAVENOUS; SOFT TISSUE AS NEEDED
Status: DISCONTINUED | OUTPATIENT
Start: 2022-01-10 | End: 2022-01-10

## 2022-01-10 RX ADMIN — FENTANYL CITRATE 25 MCG: 50 INJECTION INTRAMUSCULAR; INTRAVENOUS at 16:52

## 2022-01-10 RX ADMIN — CEFAZOLIN SODIUM 2000 MG: 2 SOLUTION INTRAVENOUS at 15:22

## 2022-01-10 RX ADMIN — FENTANYL CITRATE 25 MCG: 50 INJECTION INTRAMUSCULAR; INTRAVENOUS at 17:07

## 2022-01-10 RX ADMIN — LIDOCAINE HYDROCHLORIDE 50 MG: 10 INJECTION, SOLUTION EPIDURAL; INFILTRATION; INTRACAUDAL; PERINEURAL at 15:20

## 2022-01-10 RX ADMIN — ONDANSETRON 4 MG: 2 INJECTION INTRAMUSCULAR; INTRAVENOUS at 15:24

## 2022-01-10 RX ADMIN — MIDAZOLAM 2 MG: 1 INJECTION INTRAMUSCULAR; INTRAVENOUS at 14:26

## 2022-01-10 RX ADMIN — DEXAMETHASONE SODIUM PHOSPHATE 4 MG: 4 INJECTION, SOLUTION INTRA-ARTICULAR; INTRALESIONAL; INTRAMUSCULAR; INTRAVENOUS; SOFT TISSUE at 15:24

## 2022-01-10 RX ADMIN — FENTANYL CITRATE 50 MCG: 50 INJECTION, SOLUTION INTRAMUSCULAR; INTRAVENOUS at 14:26

## 2022-01-10 RX ADMIN — FENTANYL CITRATE 50 MCG: 50 INJECTION, SOLUTION INTRAMUSCULAR; INTRAVENOUS at 15:20

## 2022-01-10 RX ADMIN — BUPIVACAINE HYDROCHLORIDE 5 ML: 5 INJECTION, SOLUTION PERINEURAL at 14:38

## 2022-01-10 RX ADMIN — PROPOFOL 200 MG: 10 INJECTION, EMULSION INTRAVENOUS at 15:20

## 2022-01-10 RX ADMIN — SODIUM CHLORIDE, SODIUM LACTATE, POTASSIUM CHLORIDE, AND CALCIUM CHLORIDE: .6; .31; .03; .02 INJECTION, SOLUTION INTRAVENOUS at 15:14

## 2022-01-10 RX ADMIN — BUPIVACAINE 20 ML: 13.3 INJECTION, SUSPENSION, LIPOSOMAL INFILTRATION at 14:34

## 2022-01-10 NOTE — DISCHARGE INSTRUCTIONS
POST-OP VISIT    Lianne Rothman  1970      Subjective: Patient here for post-op appointment following Fusion of her left 1st metatarsophalangeal joint with removal of screws from the great toe    Patient denies significant pain at the surgical site, active strikethrough drainage, fevers, chills, nightsweats, SOB, chest pain, nor calf pain  The patient is in good spirits  Patient relates compliance with post-op instructions  Patient is   Five days post-op  Objective: The patient appears in NAD / non-toxic  Primary dressing and splint/cast taken down for wound inspection  VSS  No signs of infection  No active drainage  Normal post-op edema  No necrosis, dehiscence  both incisions over the 5th metatarsal and 1st ray are well coapted with no sign of infection  Amputation site stable  No range of motion of the 1st metatarsophalangeal joint  Tenderness to the surgical sites  Capillary refill brisk to toes  No calf pain with compression  Sensation intact all digits  Assessment/Plan:     There are no diagnoses linked to this encounter  1  Patient is stable post-op  2  Discussed compliance with weight bearing instructions, incision care, and rest  Call if any increase in pain, fevers, calf pain, shortness of breath, or general distress is noted  Patient instructed to go to ER if call is not returned immediately  3    I was unable to remove the wire from the 5th metatarsal shaft as it was buried and scarred bone and would have caused more trauma to remove the to just leave it  Most likely the patient's pain is not due to the wire suspected but do more to the fact she was walking on the outside of her foot due to the pain in the 1st ray  This should resolve once she heals from surgery  4  First ray fusion site appears stable  She is pleased with the physical appearance of her foot  Plan for suture removal 1 week  Sling:   Wear your sling at all times after your surgery (this includes sleeping), except for when you are doing pendulum exercises, showering, or physical therapy  Additionally, you should not carry anything heavier than a pencil in your hand  Dressing:   Remove all cotton and yellow gauze 48 hours after your surgery  You do not need to put a new dressing on your wound; place Band-Aids on your wound  Showering: You may shower 48 hours after surgery  Please use CAUTION!! Be careful not to slip and fall  The effects of anesthesia and/or medication may make you drowsy or light-headed  Do not soak in a bathtub, hot tub, or pool until the doctor tells you it is O K , to do so  Once you are done showering pat the wound dry and apply a Band-Aid  While in the shower you must keep the arm across the front of the body as if it were still in the sling  Sleeping:   You will most likely have difficulty sleeping in the first few weeks after surgery  Most people find it more comfortable to sleep in a reclining position  You can either sleep in a recliner chair or create this position with pillows  Ice:   You can ice the shoulder to reduce swelling and discomfort  Do not ice the shoulder more than 20 minutes at a time  Let the shoulder warm up before reapplication  Avoid getting you wound wet  If you have a Cryocuff you may keep this on continuously  Follow-up visit:   You need to see the doctor about one week following surgery for your first post-op visit  At that time your sutures (stitches) will be removed  You will be given a prescription to begin physical therapy if you were not already given one  Common concerns:   Bruising and/or swelling of the shoulder, arm, or hand are common after surgery  To relieve this discomfort it is best to ice the shoulder  Please call if:   1  Any oozing or redness of the wound, fevers (>101 3°F), or chills       2  Any difficulty breathing or heaviness in the chest      REMEMBER - these are only guidelines for what to expect  If you have any questions or concerns, please do not hesitate to call the office  [phone]

## 2022-01-10 NOTE — OP NOTE
PERATIVE REPORT  PATIENT NAME: Jael Dillard    :  1975  MRN: [id number]  Pt Location: [facility] OR ROOM 01    SURGERY DATE: 1/10/2022    Surgeon(s) and Role:     * Abraham Choe MD - Primary     * Meg Mcbride PA-C - Assisting necessary for the procedure for improved visualization due to the minimally invasive arthroscopic techniques utilized for this operation versus radiation of the camera and shaver and bur was assistance with biceps tenodesis techniques for assistance with suture management and placement the anchor  Peter WALLACE  And [facility] 2nd assistant    Preop Diagnosis:  Internal derangement of right shoulder [M24 811]    Post-Op Diagnosis Codes:     * Internal derangement of right shoulder [M24 811] with highly unstable slap tear and partial-thickness long head of biceps tendon tear and subacromial impingement and partial-thickness bursal sided rotator cuff tear    Procedure:  Right shoulder arthroscopy with biceps tenodesis utilizing Arthrex 7 mm tenodesis SwiveLock suture anchor BioComposite as well as subacromial decompression and limited debridement of rotator cuff tear    Specimen(s):  * No specimens in log *    Estimated Blood Loss:   Minimal    Drains:  * No LDAs found *    Anesthesia Type:   Regional with general    Operative Indications:  Internal derangement of right shoulder [M24 811]  Mak Vasquez is a 79-year-old female who has been suffering long-term with right shoulder pain  She had failed non operative measures such as physical therapy and anti-inflammatories and an MRI demonstrated what appeared to be a partial rotator cuff tear and a possible SLAP tear  She understood the risks and benefits of right shoulder arthroscopy with possible rotator cuff repair and wished to go ahead    The risks are inclusive of but not limited to infection, stiffness, nerve injury causing numbness pain and weakness, failure to achieve anticipated results, failure to regain full strength and ability, worsening of symptoms, failure to alleviate discomfort, worsening of symptoms, and need for further surgery  Operative Findings:  Right shoulder exam under anesthesia demonstrated excellent range of motion achieving forward flexion and abduction each to 160° with external rotation to 80° internal rotation is 70°  Intra-articular findings demonstrated good appearance of articular cartilage in the glenohumeral joint and no loose bodies in axillary pouch an intact subscapularis tendon as well as an intact articular side of the supraspinatus tendon  Long head of biceps tendon had a highly unstable anchor on the superior labrum where there was a fairly some substantial tear  Anterior labrum posterior labrum were intact  The long head of biceps tendon also had a partial tear along its course  We thus decided to perform a tenodesis due to the instability at the long head of biceps anchor  We had a stable tenodesis at the end of the procedure  We also demonstrated on the bursal side that there was partial-thickness tearing of the supraspinatus tendon upon which we performed a debridement with a shaver  There was also significant bursitis in this region that was debrided as well  We also demonstrated subacromial impingement with a type 3 subacromial spur and upon which we performed acromioplasty for subacromial decompression  No rotator cuff repair was warranted as partial tears were longitudinal in nature and fairly shallow  Complications:   None    Procedure and Technique:  Francse was taken to the operating room and placed supine on the OR table  She was given preoperative IV antibiotics as well as preoperative by attending anesthesiologist   General anesthesia was induced and she was brought comfortably safely into the beach chair position with all parts well padded and the head neutral in the head espinoza  Right shoulder was taken through exam under anesthesia as described above  Right upper extremity was then prepped and draped in usual sterile fashion  We took a surgical time-out  We then created a posterior portal 11 blade  Diagnostic arthroscopy begun an anterior portal was made in the rotator interval with 11 blade  We demonstrated good appearance of articular cartilage in the glenohumeral joint with no loose bodies in axillary pouch  There was an obvious large superior labral tear causing a highly unstable anchor of the long head of biceps tendon  We demonstrated intact subscapularis tendon  There was also a partial tear to the long head of biceps tendon along its course  We demonstrated intact articular surface of the supraspinatus tendon  We thus created an accessory anterolateral portal over the long head of biceps tendon with 11 blade  We began our tenodesis technique  We did tag and whipstitched the long head of biceps tendon utilizing FiberWire suture  We then tenotomized tendon use of the Wand as there was a large superior labral tear  We debrided the superior labrum back to a stable rim of tissue with use of a shaver  We then drilled for the 7 mm tenodesis BioComposite SwiveLock implant  We were then able to place the implant and dunked the long head of biceps tendon into the socket that was created and tied the FiberWire sutures from the implant to the FiberWire sutures from the whipstitch of the biceps tendon for additional fixation  We then went to the subacromial space we demonstrated dense bursitis and significant impingement  We created a lateral portal 11 blade  We did demonstrate partial-thickness tearing to the supraspinatus tendon that was debrided with a shaver  These were mostly shallow longitudinal tears  We also demonstrated significant bursitis that we debrided with a shaver  We also demonstrated type 3 subacromial spur upon which we performed acromioplasty for subacromial decompression with use of a bur    We did not demonstrate any signs of rotator cuff tear that required repair as these were relatively shallow tears with a completely intact articular surface of the supraspinatus tendon  Thus, removed arthroscopic equipment and closed the portals with 4-0 nylon suture  Dry, sterile dressings were applied with a sling  She tolerated procedure well and transferred to recovery room stable condition  She will follow up in 1 week for suture removal   She will be on the biceps tenodesis rehabilitation protocol     I was present for the entire procedure and A qualified resident physician was not available    Patient Disposition:  PACU       SIGNATURE: Florina Balderas MD  DATE: January 10, 2022  TIME: 4:19 PM

## 2022-01-10 NOTE — ANESTHESIA PROCEDURE NOTES
Peripheral Block    Patient location during procedure: holding area  Start time: 1/10/2022 2:34 PM  Reason for block: procedure for pain, at surgeon's request and post-op pain management  Staffing  Anesthesiologist: Felisha Crawford MD  Resident/CRNA: Rolando Doan CRNA  Preanesthetic Checklist  Completed: patient identified, IV checked, site marked, risks and benefits discussed, surgical consent, monitors and equipment checked, pre-op evaluation and timeout performed  Peripheral Block  Patient position: supine  Prep: ChloraPrep  Patient monitoring: heart rate, cardiac monitor, continuous pulse ox and frequent blood pressure checks  Block type: interscalene  Laterality: right  Injection technique: single-shot  Procedures: ultrasound guided, Ultrasound guidance required for the procedure to increase accuracy and safety of medication placement and decrease risk of complications    Ultrasound permanent image savedbupivacaine (MARCAINE) 0 5 % perineural infiltration, 5 mL (with 20 ml of exparel)  Needle  Needle type: Stimuplex   Needle gauge: 22 G  Needle length: 10 cm  Needle localization: ultrasound guidance  Assessment  Injection assessment: incremental injection, local visualized surrounding nerve on ultrasound, negative aspiration for CSF, negative aspiration for heme and no paresthesia on injection  Paresthesia pain: none  Heart rate change: no  Slow fractionated injection: yes  Post-procedure:  site cleaned  patient tolerated the procedure well with no immediate complications

## 2022-01-10 NOTE — H&P
Assessment/Plan:  The patient would like to proceed with right shoulder diagnostic arthroscopy with possible rotator cuff repair  We discussed the procedure and risks at length, including but not limited to, infection, bleeding, wound issues, nerve injury, blood clot, stiffness, failure of procedure, and need for additional surgery  We will see the patient back at the time of surgery  Subjective:   Carmela Paez is a 51 y o  female with right shoulder rotator cuff tendonitis who notes ongoing pain and weakness of the right shoulder despite conservative treatment thus far  Review of Systems   Constitutional: Negative  Negative for chills and fever  HENT: Negative  Negative for ear pain and sore throat  Eyes: Negative  Negative for pain and redness  Respiratory: Negative  Negative for shortness of breath and wheezing  Cardiovascular: Negative for chest pain and palpitations  Gastrointestinal: Negative  Negative for abdominal pain and blood in stool  Endocrine: Negative  Negative for polydipsia and polyuria  Genitourinary: Negative  Negative for difficulty urinating and dysuria  Musculoskeletal:        As noted in HPI   Skin: Negative  Negative for pallor and rash  Neurological: Negative  Negative for dizziness and numbness  Hematological: Negative  Negative for adenopathy  Does not bruise/bleed easily  Psychiatric/Behavioral: Negative  Negative for confusion and suicidal ideas           Past Medical History:   Diagnosis Date    Gestational diabetes        Past Surgical History:   Procedure Laterality Date     SECTION  0     SECTION  2016       Family History   Problem Relation Age of Onset    No Known Problems Mother     Lung cancer Father 62        smoker    No Known Problems Daughter     Stomach cancer Maternal Grandmother     No Known Problems Maternal Grandfather     Stomach cancer Paternal Grandmother     No Known Problems Paternal Grandfather     No Known Problems Daughter     No Known Problems Brother     No Known Problems Brother     No Known Problems Brother     No Known Problems Brother     No Known Problems Brother     No Known Problems Sister        Social History     Occupational History    Not on file   Tobacco Use    Smoking status: Never Smoker    Smokeless tobacco: Never Used   Vaping Use    Vaping Use: Never used   Substance and Sexual Activity    Alcohol use: No    Drug use: No    Sexual activity: Yes     Partners: Male     Birth control/protection: Injection         Current Facility-Administered Medications:     bupivacaine liposomal (EXPAREL) 1 3 % injection 20 mL, 20 mL, Infiltration, Once, Sharri Rosen MD    lactated ringers infusion, 75 mL/hr, Intravenous, Continuous, Sharri Rosen MD    No Known Allergies    Objective:  Vitals:    01/10/22 1239   BP: 139/66   Pulse: 74   Resp: 16   Temp: 97 9 °F (36 6 °C)   SpO2: 99%       Ortho Exam    Physical Exam  Constitutional:       General: She is not in acute distress  Appearance: She is well-developed  HENT:      Head: Normocephalic and atraumatic  Eyes:      General: No scleral icterus  Conjunctiva/sclera: Conjunctivae normal    Neck:      Vascular: No JVD  Cardiovascular:      Rate and Rhythm: Normal rate  Pulmonary:      Effort: Pulmonary effort is normal  No respiratory distress  Skin:     General: Skin is warm  Neurological:      Mental Status: She is alert and oriented to person, place, and time        Coordination: Coordination normal        Right shoulder: Positive empty can test

## 2022-01-10 NOTE — ANESTHESIA POSTPROCEDURE EVALUATION
Post-Op Assessment Note    CV Status:  Stable  Pain Score: 0    Pain management: adequate     Mental Status:  Alert and awake   Hydration Status:  Euvolemic   PONV Controlled:  Controlled   Airway Patency:  Patent      Post Op Vitals Reviewed: Yes      Staff: CRNA         No complications documented      BP   138/73   Temp   97 3   Pulse 82   Resp 16   SpO2 100%

## 2022-01-19 ENCOUNTER — OFFICE VISIT (OUTPATIENT)
Dept: OBGYN CLINIC | Facility: CLINIC | Age: 47
End: 2022-01-19

## 2022-01-19 VITALS — HEIGHT: 61 IN | BODY MASS INDEX: 37.19 KG/M2 | WEIGHT: 197 LBS

## 2022-01-19 DIAGNOSIS — M24.811 INTERNAL DERANGEMENT OF RIGHT SHOULDER: Primary | ICD-10-CM

## 2022-01-19 DIAGNOSIS — Z98.890 S/P ARTHROSCOPY OF RIGHT SHOULDER: ICD-10-CM

## 2022-01-19 PROCEDURE — 99024 POSTOP FOLLOW-UP VISIT: CPT | Performed by: PHYSICIAN ASSISTANT

## 2022-01-19 NOTE — PROGRESS NOTES
Assessment/Plan:  1  Internal derangement of right shoulder  Ambulatory Referral to Physical Therapy   2  S/P arthroscopy of right shoulder       Patient is doing well, and we did discuss that her pain should continue to improve  She will start physical therapy next week on the biceps tenodesis protocol  She will remain in her sling except for therapy, showering, dressing  This will be for 4 weeks postoperatively  She will follow up in 5 weeks for repeat evaluation  Subjective:   Maria Victoria Rodriguez is a 51 y o  female who presents today for follow-up of her right shoulder, now 9 days status post arthroscopic biceps tenodesis  She does note moderate discomfort about the shoulder still  She has been compliant with wearing her sling  She notes good sensation of the right upper extremity        Review of Systems      Past Medical History:   Diagnosis Date    Gestational diabetes        Past Surgical History:   Procedure Laterality Date     SECTION       SECTION  2016    OK SHLDR ARTHROSCOP,SURG,W/ROTAT CUFF REPR Right 1/10/2022    Procedure: DIAGNOSTIC ARTHROSCOPY WITH BICEPS TENODESIS, SUBACROMIAL DECOMPRESSION, AND LIMITED DEBRIDEMENT;  Surgeon: Michelle Canseco MD;  Location: [facility];  Service: Orthopedics       Family History   Problem Relation Age of Onset    No Known Problems Mother     Lung cancer Father 62        smoker    No Known Problems Daughter     Stomach cancer Maternal Grandmother     No Known Problems Maternal Grandfather     Stomach cancer Paternal Grandmother     No Known Problems Paternal Grandfather     No Known Problems Daughter     No Known Problems Brother     No Known Problems Brother     No Known Problems Brother     No Known Problems Brother     No Known Problems Brother     No Known Problems Sister        Social History     Occupational History    Not on file   Tobacco Use    Smoking status: Never Smoker    Smokeless tobacco: Never Used   Vaping Use    Vaping Use: Never used   Substance and Sexual Activity    Alcohol use: No    Drug use: No    Sexual activity: Yes     Partners: Male     Birth control/protection: Injection         Current Outpatient Medications:     medroxyPROGESTERone (DEPO-PROVERA) 150 mg/mL injection, Inject 1 mL (150 mg total) into a muscle every 3 (three) months, Disp: 1 mL, Rfl: 4    oxyCODONE (Roxicodone) 5 immediate release tablet, Take 1 tablet (5 mg total) by mouth every 4 (four) hours as needed for moderate pain for up to 15 doses Max Daily Amount: 30 mg (Patient not taking: Reported on 1/19/2022 ), Disp: 15 tablet, Rfl: 0    Current Facility-Administered Medications:     medroxyPROGESTERone (DEPO-PROVERA) IM injection 150 mg, 150 mg, Intramuscular, Q3 Months, PETER Iverson, 150 mg at 10/21/21 0953    No Known Allergies    Objective:  Vitals:       Ortho Exam    Physical Exam    Right shoulder:  Sutures removed  Incisions clean dry and intact  No erythema appreciated  Mild swelling right shoulder  Sensation intact right upper extremity  Shoulder range of motion strength testing deferred  Patient moves fingers and wrist freely  2+ radial pulse

## 2022-01-20 ENCOUNTER — TELEPHONE (OUTPATIENT)
Dept: OBGYN CLINIC | Facility: HOSPITAL | Age: 47
End: 2022-01-20

## 2022-01-27 ENCOUNTER — TELEPHONE (OUTPATIENT)
Dept: OBGYN CLINIC | Facility: OTHER | Age: 47
End: 2022-01-27

## 2022-01-27 NOTE — TELEPHONE ENCOUNTER
Tamia @ [facility] called to see if patient was seen by Dr Leyda Mei yesterday  I advised no      She then asked when Dr Mayuri Lopez next appointment is

## 2022-01-31 ENCOUNTER — CLINICAL SUPPORT (OUTPATIENT)
Dept: OBGYN CLINIC | Facility: CLINIC | Age: 47
End: 2022-01-31

## 2022-01-31 ENCOUNTER — EVALUATION (OUTPATIENT)
Dept: PHYSICAL THERAPY | Facility: CLINIC | Age: 47
End: 2022-01-31
Payer: COMMERCIAL

## 2022-01-31 DIAGNOSIS — M67.813 BICEPS TENDONOSIS OF RIGHT SHOULDER: Primary | ICD-10-CM

## 2022-01-31 DIAGNOSIS — Z30.42 ENCOUNTER FOR SURVEILLANCE OF INJECTABLE CONTRACEPTIVE: Primary | ICD-10-CM

## 2022-01-31 DIAGNOSIS — Z98.890 STATUS POST SUBACROMIAL DECOMPRESSION: ICD-10-CM

## 2022-01-31 LAB — SL AMB POCT URINE HCG: NEGATIVE

## 2022-01-31 PROCEDURE — 97110 THERAPEUTIC EXERCISES: CPT | Performed by: PHYSICAL THERAPIST

## 2022-01-31 PROCEDURE — 97161 PT EVAL LOW COMPLEX 20 MIN: CPT | Performed by: PHYSICAL THERAPIST

## 2022-01-31 PROCEDURE — 96372 THER/PROPH/DIAG INJ SC/IM: CPT

## 2022-01-31 RX ADMIN — MEDROXYPROGESTERONE ACETATE 150 MG: 150 INJECTION, SUSPENSION INTRAMUSCULAR at 13:36

## 2022-01-31 NOTE — PROGRESS NOTES
PT Evaluation    Today's date: 2022  Patient name: Owen Anglin  : 1975  MRN: [id number]  Referring provider: Bria Parra PA-C  Dx: No diagnosis found  Subjective Evaluation     History of Present Illness    Pt is a 50 yo female who presents with her  Jim Garcia s/p subacromial decompression and LHBT tenodesis arthroscopically on 1/10/22  She had pain for 1 year shoulder pain from repetitive lifting and twisting at her work at a [employer]  She first went to [facility] in 2021  She denies electrical symptoms but the fingers do swell  Patient presents in sling and states MD mentioned not to actively raise it for 5 weeks  She has done this in the shower and she feels as if it gets stuck and has trouble bringing it back down  This sensation also happens when she sleeps and the arm falls in front of her or behind her in the sling  She has not yet transitioned to the bed  This has been tough for her because she is used to doing 10 things at one time but now is not very independent  Surgical note: "Long head of biceps tendon had a highly unstable anchor on the superior labrum where there was a fairly some substantial tear  Anterior labrum posterior labrum were intact  The long head of biceps tendon also had a partial tear along its course  We thus decided to perform a tenodesis due to the instability at the long head of biceps anchor  We had a stable tenodesis at the end of the procedure  We also demonstrated on the bursal side that there was partial-thickness tearing of the supraspinatus tendon upon which we performed a debridement with a shaver    acromioplasty for subacromial decompression         Neuro signs: none  Red flags: none  Occupation: - disability      Pain  At best pain ratin  At worst pain ratin  Location: ant shoulder and scapular pain  Quality: burning  Relieving factors: tylenol, ibuprofen, ice  Aggravating factors: lifting arm,     Social Support  Lives with 2 kids at home and her  who helps quite a bit      Patient Goals  Patient goals for therapy: Get back to what she was normally doing    STGs  1  Decrease pain by 20% in 2-4 weeks  2  Improve shoulder ROM to protocol standards in 2-4 weeks  3  Improve isometric strength s pain in 2-4 weeks  LTGs  1  Decrease pain by 60% in 6-8 weeks  2  Improve overhead reaching tolerance to cabinet height in 10-12 weeks  3  Perform job activities without pain in 6-8 weeks  Objective Measurements:  Observation:  Incision:   Sensation:WFL      Alar lig:  Transverse lig:  Spurlings:  Compression: Distraction: VBI:       DEIDRE:  Palpation:    Cervical ROM L R Strength L R   Flex   WFL   Mid trap     Ext  painful in R UT   Low trap     SB        Rotation   [facility]      Shoulder A/PROM        Flex  / 35p / Flex     Abd  /  25p/ Abd     ER  /  20p/ ER     IR scratch   IR     ER scratch        Wrist flex   flex     ext  20p/50 ext     Elbow ext  Lacking 10 deg      supination  Lacking 20 deg              Assessment:    Barak Zhou is a pleasant 51 y o  female who presents with referring diagnosis limiting her ability to perform home tasks and return to work  The patient's greatest concern is improving her activity level  No further referral appears necessary at this time based upon examination results  Impairments include:  1) Decreased shoulder ROM  2) Decreased elbow ROM   3) Hypersensitivity to touch     Etiologic factors include postsurgical     Negative prognostic indicators: hypersensitivity to light touch  Positive prognostic indicators: good family support  Please contact me if you have any further questions or recommendations   Thank you very much for the kind referral         Plan  Patient would benefit from:Skilled physical therapy  Planned therapy interventions: manual therapy, neuromuscular re-education, stretching, strengthening, therapeutic activities, therapeutic exercise, patient education, home exercise program, and activity modification      Frequency: 2x week  Duration in weeks:12  Treatment plan discussed with: patient         Goal: 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  Precautions: Greek speaking, chronic pain, central sensitization, see protocol  Dx:  R biceps tenodesis and SAD- 1/10     Daily Treatment Diary     Manuals 1/30/2022        PROM as eloina         Scap PROM in L s/l  prn                           Ther Ex         Pendulums 10x        Putty squeeze         Elbow AAROM standing extension on table 10x :05        Cervical ext AROM 10x        C- SB AROM 5x        Elbow supination 20x        Wrist ext/flex 20x                          Neuro Re-ed         Shoulder iso ER/IR         scap retraction         scap clock/                           Ther Activity                                    Gait Training                           Modalities

## 2022-01-31 NOTE — PROGRESS NOTES
RN offered  - pt declined   Depo-Provera      [x]   Patient provided box Yes   o 1 Refills remain  o Refills submitted no   Last  Annual Date / Birth control check : 7/29/2021   Last Depo date: 10/21/2021   Side effects: no   HCG: yes  o if applicable: negative   Given by: Sophy Garcia RN    Site: left deltoid    o Calcium supplement daily teaching  o Condoms for 2 weeks following first injection dose

## 2022-02-04 ENCOUNTER — OFFICE VISIT (OUTPATIENT)
Dept: PHYSICAL THERAPY | Facility: CLINIC | Age: 47
End: 2022-02-04
Payer: COMMERCIAL

## 2022-02-04 DIAGNOSIS — Z98.890 STATUS POST SUBACROMIAL DECOMPRESSION: ICD-10-CM

## 2022-02-04 DIAGNOSIS — M67.813 BICEPS TENDONOSIS OF RIGHT SHOULDER: Primary | ICD-10-CM

## 2022-02-04 PROCEDURE — 97140 MANUAL THERAPY 1/> REGIONS: CPT

## 2022-02-04 PROCEDURE — 97110 THERAPEUTIC EXERCISES: CPT

## 2022-02-04 NOTE — PROGRESS NOTES
Daily Note     Today's date: 2022  Patient name: Oh Yap  : 1975  MRN: [id number]  Referring provider: Rafiq Oliveria  Dx:   Encounter Diagnosis     ICD-10-CM    1  Biceps tendonosis of right shoulder  M67 813    2  Status post subacromial decompression  Z98 890                   Subjective: Patient reports some pain but states compliance with HEP and that her exercises are going well  On occasion she feels that her shoulder gets "stuck" and she has to wait to be able to move it  Patient  Manuel Pearson was present for entirety of session and translated for patient  Objective: See treatment diary below      Assessment: Tolerated treatment fair  Patient had some pain and exhibited a fair amount of apprehension and was muscle guarding during PROM  Due to patient muscle guarding response, PROM was deferred for the session  Reviewed protocol as patient was performing some AROM after she took off the sling and was positioning herself on table  Pain was decreased with ice at end of session  Patient would benefit from continued PT      Plan: Continue per plan of care        Goal: 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  Precautions: Tajik speaking, chronic pain, central sensitization, see protocol  Dx:  R biceps tenodesis and SAD- 1/10     Daily Treatment Diary     Manuals 2022       PROM as eloina  Not eloina        Scap PROM in L s/l  prn                           Ther Ex         Pendulums 10x 20x ea way cw,ccw,a/p, m/l       Putty squeeze         Elbow AAROM standing extension on table 10x :05 10x:05       Cervical ext AROM 10x 10x         C- SB AROM 5x 10x :10       Elbow supination 20x 20x       Wrist ext/flex 20x 20x                         Neuro Re-ed         Shoulder iso ER/IR         scap retraction         scap clock/                           Ther Activity                                    Gait Training                           Modalities

## 2022-02-07 ENCOUNTER — OFFICE VISIT (OUTPATIENT)
Dept: PHYSICAL THERAPY | Facility: CLINIC | Age: 47
End: 2022-02-07
Payer: COMMERCIAL

## 2022-02-07 DIAGNOSIS — Z98.890 STATUS POST SUBACROMIAL DECOMPRESSION: ICD-10-CM

## 2022-02-07 DIAGNOSIS — M67.813 BICEPS TENDONOSIS OF RIGHT SHOULDER: Primary | ICD-10-CM

## 2022-02-07 PROCEDURE — 97110 THERAPEUTIC EXERCISES: CPT | Performed by: PHYSICAL THERAPIST

## 2022-02-07 PROCEDURE — 97112 NEUROMUSCULAR REEDUCATION: CPT | Performed by: PHYSICAL THERAPIST

## 2022-02-07 PROCEDURE — 97140 MANUAL THERAPY 1/> REGIONS: CPT | Performed by: PHYSICAL THERAPIST

## 2022-02-07 NOTE — PROGRESS NOTES
Daily Note     Today's date: 2022  Patient name: Aura Meza  : 1975  MRN: [id number]  Referring provider: Ayana Faustin  Dx:   Encounter Diagnosis     ICD-10-CM    1  Biceps tendonosis of right shoulder  M67 813    2  Status post subacromial decompression  Z98 890                   Subjective: Patient reports she still has trouble lying flat  Objective: See treatment diary below      Assessment: Tolerated treatment fair  Patient had pain c elbow AROM  Shoulder AAROM given today  She was given weaning schedule for brace 2 hours to start for 2 days then adding an hour  Patient would benefit from continued PT      Plan: Continue per plan of care        Goal: 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  Precautions: Panamanian speaking, chronic pain, central sensitization, see protocol  Dx:  R biceps tenodesis and SAD- 1/10     Daily Treatment Diary     Manuals 2022      [facility] PROM as eloina HOB elevated   Not eloina  10'      Scap PROM in L s/l  prn                           Ther Ex         Pendulums 10x 20x ea way cw,ccw,a/p, m/l 20x      Putty squeeze   nv      Pulleys standing         Cervical ext AROM 10x 10x   10x      C- SB AROM 5x 10x :10 10x :10      ER wand   10x :05      Wrist ext/flex 20x 20x 20x      Seated flex/abd slides   20x               Neuro Re-ed         Shoulder iso ER/IR         scap retraction   10x      scap clock/                           Ther Activity                                    Gait Training                           Modalities

## 2022-02-11 ENCOUNTER — OFFICE VISIT (OUTPATIENT)
Dept: PHYSICAL THERAPY | Facility: CLINIC | Age: 47
End: 2022-02-11
Payer: COMMERCIAL

## 2022-02-11 DIAGNOSIS — Z98.890 STATUS POST SUBACROMIAL DECOMPRESSION: ICD-10-CM

## 2022-02-11 DIAGNOSIS — M67.813 BICEPS TENDONOSIS OF RIGHT SHOULDER: Primary | ICD-10-CM

## 2022-02-11 PROCEDURE — 97140 MANUAL THERAPY 1/> REGIONS: CPT | Performed by: PHYSICAL THERAPIST

## 2022-02-11 PROCEDURE — 97112 NEUROMUSCULAR REEDUCATION: CPT | Performed by: PHYSICAL THERAPIST

## 2022-02-11 PROCEDURE — 97110 THERAPEUTIC EXERCISES: CPT | Performed by: PHYSICAL THERAPIST

## 2022-02-11 NOTE — PROGRESS NOTES
Daily Note     Today's date: 2022  Patient name: Yue Phillips  : 1975  MRN: [id number]  Referring provider: Cindi Huerta  Dx:   Encounter Diagnosis     ICD-10-CM    1  Biceps tendonosis of right shoulder  M67 813    2  Status post subacromial decompression  Z98 890                   Subjective: Patient reports she has been feeling better since gradually coming out of the sling and UT stretch      Objective: See treatment diary below      Assessment: Tolerated treatment fair  Patient had improved shoulder PROM to abd 100 and flexion to 110  ER/IR is still quite limited  Will progress as tolerable  Patient would benefit from continued PT      Plan: Continue per plan of care        Goal: 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  Precautions: Georgian speaking, chronic pain, central sensitization, see protocol  Dx:  R biceps tenodesis and SAD- 1/10     Daily Treatment Diary     Manuals 2022     [address] PROM as eloina HOB elevated   Not eloina  10' 10'     Scap PROM in L s/l  prn    nv                       Ther Ex         Pendulums 10x 20x ea way cw,ccw,a/p, m/l 20x 20x     Putty squeeze   nv 3'     Pulleys standing    2 min pain     Cervical ext AROM 10x 10x   10x 20x     C- SB AROM 5x 10x :10 10x :10 10x :10     ER wand   10x :05 10x :05     Wrist ext/flex 20x 20x 20x 20x     Seated flex/abd slides   20x 20x     IR strap st         Wall walks when eloina         Neuro Re-ed         Shoulder iso ER/IR         scap retraction   10x 20x     scap clock/                           Ther Activity                                    Gait Training                           Modalities

## 2022-02-14 ENCOUNTER — OFFICE VISIT (OUTPATIENT)
Dept: PHYSICAL THERAPY | Facility: CLINIC | Age: 47
End: 2022-02-14
Payer: COMMERCIAL

## 2022-02-14 DIAGNOSIS — M67.813 BICEPS TENDONOSIS OF RIGHT SHOULDER: Primary | ICD-10-CM

## 2022-02-14 DIAGNOSIS — Z98.890 STATUS POST SUBACROMIAL DECOMPRESSION: ICD-10-CM

## 2022-02-14 PROCEDURE — 97140 MANUAL THERAPY 1/> REGIONS: CPT | Performed by: PHYSICAL THERAPIST

## 2022-02-14 PROCEDURE — 97110 THERAPEUTIC EXERCISES: CPT | Performed by: PHYSICAL THERAPIST

## 2022-02-14 PROCEDURE — 97112 NEUROMUSCULAR REEDUCATION: CPT | Performed by: PHYSICAL THERAPIST

## 2022-02-14 NOTE — PROGRESS NOTES
Daily Note     Today's date: 2022  Patient name: Yue Phillips  : 1975  MRN: [id number]  Referring provider: Cindi Huerta  Dx:   Encounter Diagnosis     ICD-10-CM    1  Biceps tendonosis of right shoulder  M67 813    2  Status post subacromial decompression  Z98 890                   Subjective: Patient reports she was worse since last visit and more sore for the last 2-3 days  She has not been sleeping well  Objective: See treatment diary below      Assessment: Tolerated treatment poor  Patient had improved shoulder PROM to abd 100 and flexion to 100  Scap PROM performed and she had palpable tenderness to rhomboids  She also did have limitation in PROM of scap  Patient would benefit from continued PT      Plan: Continue per plan of care        Goal: 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  Precautions: Liberian speaking, chronic pain, central sensitization, see protocol  Dx:  R biceps tenodesis and SAD- 1/10     Daily Treatment Diary     Manuals 2022    [address] PROM as eloina HOB elevated   Not eloina  10' 10' 8    Scap PROM in L s/l  prn    nv 4'                      Ther Ex         Pendulums 10x 20x ea way cw,ccw,a/p, m/l 20x 20x 20x    Putty squeeze   nv 3'     Pulleys standing    2 min pain hold    Cervical ext AROM 10x 10x   10x 20x     C- SB AROM 5x 10x :10 10x :10 10x :10 10x :10    ER wand   10x :05 10x :05 10x :05    Wrist ext/flex 20x 20x 20x 20x     Seated flex/abd slides   20x 20x 20x    IR strap st         Wall walks when eloina         Neuro Re-ed         Shoulder iso ER/IR         scap retraction   10x 20x nv    scap clock/                           Ther Activity                                    Gait Training                           Modalities         CP      10'

## 2022-02-18 ENCOUNTER — APPOINTMENT (OUTPATIENT)
Dept: PHYSICAL THERAPY | Facility: CLINIC | Age: 47
End: 2022-02-18
Payer: COMMERCIAL

## 2022-02-21 ENCOUNTER — APPOINTMENT (OUTPATIENT)
Dept: PHYSICAL THERAPY | Facility: CLINIC | Age: 47
End: 2022-02-21
Payer: COMMERCIAL

## 2022-02-25 ENCOUNTER — APPOINTMENT (OUTPATIENT)
Dept: PHYSICAL THERAPY | Facility: CLINIC | Age: 47
End: 2022-02-25
Payer: COMMERCIAL

## 2022-02-28 ENCOUNTER — OFFICE VISIT (OUTPATIENT)
Dept: OBGYN CLINIC | Facility: CLINIC | Age: 47
End: 2022-02-28

## 2022-02-28 VITALS — HEIGHT: 61 IN | BODY MASS INDEX: 37.19 KG/M2 | WEIGHT: 197 LBS

## 2022-02-28 DIAGNOSIS — Z98.890 S/P ARTHROSCOPY OF RIGHT SHOULDER: Primary | ICD-10-CM

## 2022-02-28 PROCEDURE — 99024 POSTOP FOLLOW-UP VISIT: CPT | Performed by: ORTHOPAEDIC SURGERY

## 2022-02-28 NOTE — PROGRESS NOTES
Assessment/Plan:  1  S/P arthroscopy of right shoulder       Scribe Attestation    I,:  Felix Stone am acting as a scribe while in the presence of the attending physician :       I,:  Kassandra Wilson MD personally performed the services described in this documentation    as scribed in my presence :         Mahi is a pleasant 51year old who presents to the office today 7 weeks s/p right shoulder diagnostic arthroscopy with biceps tenodesis and subacromial decompression, performed on 01/10/2022  Formal physical therapy notes were reviewed with the patient at today's visit  I discussed with the patient as time she should continue her efforts with formal physical therapy  At this time, she may discontinue the use of the sling  She may use a body pillow to help with sleeping  She was instructed to take Tylenol Arthritis 2 tablets and 3 IBU at nighttime to help with sleeping  I will follow-up with her in 6 weeks for a clinical re-evaluation  She understood and had no further questions  Subjective:   Patient ID: Larnell Dubin is a 51 y o  female who presents to the office today 7 weeks s/p right shoulder diagnostic arthroscopy with biceps tenodesis and subacromial decompression, performed on 01/10/2022  She has been compliant with the use of sling  She has been compliant with the attending formal physical therapy  She states that she is experiencing pain throughout the day and night but denies taking anything for pain  She states that she notes difficulty sleeping  She denies any distal paresthesia of her right upper extremity  Review of Systems   Constitutional: Negative for chills, fever and unexpected weight change  HENT: Negative for hearing loss, nosebleeds and sore throat  Eyes: Negative for pain, redness and visual disturbance  Respiratory: Negative for cough, shortness of breath and wheezing  Cardiovascular: Negative for chest pain, palpitations and leg swelling  Gastrointestinal: Negative for abdominal pain, nausea and vomiting  Endocrine: Negative for polyphagia and polyuria  Genitourinary: Negative for dysuria and hematuria  Musculoskeletal:        As noted in HPI   Skin: Negative for rash and wound  Neurological: Negative for dizziness, numbness and headaches  Psychiatric/Behavioral: Negative for confusion and suicidal ideas  The patient is not nervous/anxious            Past Medical History:   Diagnosis Date    Gestational diabetes        Past Surgical History:   Procedure Laterality Date     SECTION       SECTION  2016    IA SHLDR ARTHROSCOP,SURG,W/ROTAT CUFF REPR Right 1/10/2022    Procedure: DIAGNOSTIC ARTHROSCOPY WITH BICEPS TENODESIS, SUBACROMIAL DECOMPRESSION, AND LIMITED DEBRIDEMENT;  Surgeon: Soni Tripp MD;  Location: [facility];  Service: Orthopedics       Family History   Problem Relation Age of Onset    No Known Problems Mother     Lung cancer Father 62        smoker    No Known Problems Daughter     Stomach cancer Maternal Grandmother     No Known Problems Maternal Grandfather     Stomach cancer Paternal Grandmother     No Known Problems Paternal Grandfather     No Known Problems Daughter     No Known Problems Brother     No Known Problems Brother     No Known Problems Brother     No Known Problems Brother     No Known Problems Brother     No Known Problems Sister        Social History     Occupational History    Not on file   Tobacco Use    Smoking status: Never Smoker    Smokeless tobacco: Never Used   Vaping Use    Vaping Use: Never used   Substance and Sexual Activity    Alcohol use: No    Drug use: No    Sexual activity: Yes     Partners: Male     Birth control/protection: Injection         Current Outpatient Medications:     medroxyPROGESTERone (DEPO-PROVERA) 150 mg/mL injection, Inject 1 mL (150 mg total) into a muscle every 3 (three) months, Disp: 1 mL, Rfl: 4    oxyCODONE (Roxicodone) 5 immediate release tablet, Take 1 tablet (5 mg total) by mouth every 4 (four) hours as needed for moderate pain for up to 15 doses Max Daily Amount: 30 mg (Patient not taking: Reported on 1/19/2022 ), Disp: 15 tablet, Rfl: 0    Current Facility-Administered Medications:     medroxyPROGESTERone (DEPO-PROVERA) IM injection 150 mg, 150 mg, Intramuscular, Q3 Months, PETER Iverson, 150 mg at 01/31/22 1336    No Known Allergies    Objective: There were no vitals filed for this visit  Right Shoulder Exam     Tenderness   The patient is experiencing no tenderness  Range of Motion   Forward flexion: 70     Other   Erythema: absent  Scars: present (Well-healed arthroscopic portals)  Sensation: normal  Pulse: present    Comments:  Neurovascularly intact            Physical Exam  Vitals reviewed  Constitutional:       Appearance: Normal appearance  She is well-developed  HENT:      Head: Normocephalic and atraumatic  Eyes:      General:         Right eye: No discharge  Left eye: No discharge  Extraocular Movements: Extraocular movements intact  Conjunctiva/sclera: Conjunctivae normal    Cardiovascular:      Rate and Rhythm: Normal rate  Pulmonary:      Effort: Pulmonary effort is normal  No respiratory distress  Musculoskeletal:      Cervical back: Normal range of motion and neck supple  Skin:     General: Skin is warm and dry  Neurological:      General: No focal deficit present  Mental Status: She is alert and oriented to person, place, and time  Psychiatric:         Mood and Affect: Mood normal          Behavior: Behavior normal          I have personally reviewed pertinent films in PACS and my interpretation is as follows: No new images reviewed today

## 2022-03-01 ENCOUNTER — TELEPHONE (OUTPATIENT)
Dept: OBGYN CLINIC | Facility: HOSPITAL | Age: 47
End: 2022-03-01

## 2022-03-01 NOTE — TELEPHONE ENCOUNTER
DR Ilya Wall  RE: Bonita Moya for 2/28       Mrs Jessica Cervantes from [address] called asking for OVN for 2?28/22  to be faxed to [address]      I e-faxed documents to:      Glen Bañuelos at [address]  FAX# [phone]  REF #:  [id number]

## 2022-03-04 ENCOUNTER — APPOINTMENT (OUTPATIENT)
Dept: PHYSICAL THERAPY | Facility: CLINIC | Age: 47
End: 2022-03-04
Payer: COMMERCIAL

## 2022-03-07 ENCOUNTER — OFFICE VISIT (OUTPATIENT)
Dept: PHYSICAL THERAPY | Facility: CLINIC | Age: 47
End: 2022-03-07
Payer: COMMERCIAL

## 2022-03-07 DIAGNOSIS — M67.813 BICEPS TENDONOSIS OF RIGHT SHOULDER: Primary | ICD-10-CM

## 2022-03-07 DIAGNOSIS — Z98.890 STATUS POST SUBACROMIAL DECOMPRESSION: ICD-10-CM

## 2022-03-07 PROCEDURE — 97140 MANUAL THERAPY 1/> REGIONS: CPT | Performed by: PHYSICAL THERAPIST

## 2022-03-07 PROCEDURE — 97112 NEUROMUSCULAR REEDUCATION: CPT | Performed by: PHYSICAL THERAPIST

## 2022-03-07 PROCEDURE — 97110 THERAPEUTIC EXERCISES: CPT | Performed by: PHYSICAL THERAPIST

## 2022-03-07 NOTE — PROGRESS NOTES
PT Re-Evaluation    Today's date: 3/7/2022  Patient name: Amanda Hassan  : 1975  MRN: [id number]  Referring provider: Candace Carter  Dx:   Encounter Diagnosis     ICD-10-CM    1  Biceps tendonosis of right shoulder  M67 813    2  Status post subacromial decompression  Z98 890            Subjective Evaluation     History of Present Illness    She states she still has trouble sleeping  She is still quite limited by the pain  She is cooking a little but it hurts to cut items  She does need help with dressing and is sleeping in the recliner  She states her pain has been feeling worse since the surgery  The arm pulls a lot  Hx of injury:  Pt is a 52 yo female who presents with her  Carla Fowler s/p subacromial decompression and LHBT tenodesis arthroscopically on 1/10/22  She had pain for 1 year shoulder pain from repetitive lifting and twisting at her work at a [employer]  She first went to [facility] in 2021  She denies electrical symptoms but the fingers do swell  Patient presents in sling and states MD mentioned not to actively raise it for 5 weeks  She has done this in the shower and she feels as if it gets stuck and has trouble bringing it back down  This sensation also happens when she sleeps and the arm falls in front of her or behind her in the sling  She has not yet transitioned to the bed  This has been tough for her because she is used to doing 10 things at one time but now is not very independent  Surgical note: "Long head of biceps tendon had a highly unstable anchor on the superior labrum where there was a fairly some substantial tear  Anterior labrum posterior labrum were intact  The long head of biceps tendon also had a partial tear along its course  We thus decided to perform a tenodesis due to the instability at the long head of biceps anchor  We had a stable tenodesis at the end of the procedure    We also demonstrated on the bursal side that there was partial-thickness tearing of the supraspinatus tendon upon which we performed a debridement with a shaver    acromioplasty for subacromial decompression         Neuro signs: none  Red flags: none  Occupation: - disability      Pain  At best pain ratin  At worst pain ratin  Location: ant shoulder and scapular pain  Quality: burning  Relieving factors: tylenol, ibuprofen, ice  Aggravating factors: lifting arm,     Social Support  Lives with 2 kids at home and her  who helps quite a bit      Patient Goals  Patient goals for therapy: Get back to what she was normally doing    STGs  1  Decrease pain by 20% in 2-4 weeks  - not met  2  Improve shoulder ROM to protocol standards in 2-4 weeks  - not met  3  Improve isometric strength s pain in 2-4 weeks  - met      LTGs  1  Decrease pain by 60% in 6-8 weeks  2  Improve overhead reaching tolerance to cabinet height in 10-12 weeks  3  Perform job activities without pain in 6-8 weeks  Objective Measurements:  Observation:  Incision:   Sensation:WFL          DEIDRE: unable to tolerate [address] mobs  Palpation: TTP to infraspinatus and teres major hypersensitivity to touch    Cervical ROM L R Strength L R   Flex   WFL   Mid trap     Ext  painful in R UT   Low trap     SB        Rotation   [facility]      Shoulder A/PROM        Flex  / 85p /120p Flex     Abd  /  62p/85p Abd     ER  / 34p/20p ER  4   IR scratch  C3/ IR  4   ER scratch  R glut      Wrist flex   flex     ext  20p/50 NT ext     Elbow ext        supination  Lacking 10 deg              Assessment:  Owen Anglin has demonstrated overall improvement in ROM, strength, function, and a reduction in pain  Currently, she has made steady progress towards her goals, but continues to remain limited with Decreased shoulder strength/ROM, Hypersensitivity to touch, poor scapular control, postural deficits   At this time, skilled physical therapy is warranted to address the remaining impairments and aide in return to dressing, driving, work, cooking, and sleeping in bed s pain  Limiting factors to therapy central sensitization and chronicity of sxs and she  demonstrates good motivation  Plan  Patient would benefit from:Skilled physical therapy  Planned therapy interventions: manual therapy, neuromuscular re-education, stretching, strengthening, therapeutic activities, therapeutic exercise, patient education, home exercise program, and activity modification      Frequency: 2x week  Duration in weeks:12  Treatment plan discussed with: patient       Goal: 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, sleeping in bed, shoulder not feeling out of place  Precautions: Thai speaking, chronic pain, central sensitization, see protocol  Dx:  R biceps tenodesis and SAD- 1/10     Daily Treatment Diary     Manuals 1/30/2022 2/4 2/7 2/11 2/14 3/7   [facility] PROM as eloina HOB elevated   Not eloina  10' 10' 8 10'   Scap PROM in L s/l  prn    nv 4' nv                     Ther Ex         Pendulums 10x 20x ea way cw,ccw,a/p, m/l 20x 20x 20x    Wand ext         Pulleys standing    2 min pain hold declined   Wall slides      20x   C- SB AROM 5x 10x :10 10x :10 10x :10 10x :10    ER wand   10x :05 10x :05 10x :05 10x :05   Wrist ext/flex 20x 20x 20x 20x     Seated flex/abd slides   20x 20x 20x    IR strap st      10x :05   Elbow AROM flex      20x   Neuro Re-ed         Shoulder iso ER/IR         scap retraction   10x 20x nv    scap clock/         AROM scaption B                  Ther Activity                                    Gait Training                           Modalities         CP      10' 10'

## 2022-03-11 ENCOUNTER — OFFICE VISIT (OUTPATIENT)
Dept: PHYSICAL THERAPY | Facility: CLINIC | Age: 47
End: 2022-03-11
Payer: COMMERCIAL

## 2022-03-11 DIAGNOSIS — Z98.890 STATUS POST SUBACROMIAL DECOMPRESSION: ICD-10-CM

## 2022-03-11 DIAGNOSIS — M67.813 BICEPS TENDONOSIS OF RIGHT SHOULDER: Primary | ICD-10-CM

## 2022-03-11 PROCEDURE — 97112 NEUROMUSCULAR REEDUCATION: CPT | Performed by: PHYSICAL THERAPIST

## 2022-03-11 PROCEDURE — 97140 MANUAL THERAPY 1/> REGIONS: CPT | Performed by: PHYSICAL THERAPIST

## 2022-03-11 PROCEDURE — 97110 THERAPEUTIC EXERCISES: CPT | Performed by: PHYSICAL THERAPIST

## 2022-03-11 NOTE — PROGRESS NOTES
Daily Note     Today's date: 3/11/2022  Patient name: Amanda Hassan  : 1975  MRN: [id number]  Referring provider: Candace Carter  Dx:   Encounter Diagnosis     ICD-10-CM    1  Biceps tendonosis of right shoulder  M67 813    2  Status post subacromial decompression  Z98 890                   Subjective: She states she does continue c pain but overall is improving  Objective: See treatment diary below      Assessment: Tolerated treatment fair  She had poor tolerance to scap PROM so AROM was shown c use of 3 pillows under neck  Her  was shown how to help her PROM  She was not able to get past 90 deg elevation today  She did get 120 PROM at lv  This was probably due to her lying fully in supine  Patient would benefit from continued PT      Plan: Continue per plan of care        Goal: 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, sleeping in bed, shoulder not feeling out of place  Precautions: [place]araguan speaking, chronic pain, central sensitization, see protocol  Dx:  R biceps tenodesis and SAD- 1/10     Daily Treatment Diary     Manuals 3/11 2/4 2/7 2/11 2/14 3/7   [address] PROM as eloina HOB elevated  10' Not eloina  10' 10' 8 10'   Scap PROM in L s/l  prn 6'- hold nv   nv 4' nv                     Ther Ex         Table slides flex/abd 20x pbal nv        Wand ext 10x        Pulleys standing    2 min pain hold declined   Wall slides 20x     20x   C- SB AROM  10x :10 10x :10 10x :10 10x :10    ER wand   10x :05 10x :05 10x :05 10x :05                     IR strap st 10x :05     10x :05   Elbow AROM flex nv     20x   Neuro Re-ed         Shoulder iso ER/IR         scap retraction and depression in s/l  20x  10x 20x nv             AROM scaption B 10x                 Ther Activity                                    Gait Training                           Modalities         CP  5'    10' 10'

## 2022-03-14 ENCOUNTER — OFFICE VISIT (OUTPATIENT)
Dept: PHYSICAL THERAPY | Facility: CLINIC | Age: 47
End: 2022-03-14
Payer: COMMERCIAL

## 2022-03-14 DIAGNOSIS — M67.813 BICEPS TENDONOSIS OF RIGHT SHOULDER: Primary | ICD-10-CM

## 2022-03-14 DIAGNOSIS — Z98.890 STATUS POST SUBACROMIAL DECOMPRESSION: ICD-10-CM

## 2022-03-14 PROCEDURE — 97110 THERAPEUTIC EXERCISES: CPT | Performed by: PHYSICAL THERAPIST

## 2022-03-14 PROCEDURE — 97112 NEUROMUSCULAR REEDUCATION: CPT | Performed by: PHYSICAL THERAPIST

## 2022-03-14 PROCEDURE — 97140 MANUAL THERAPY 1/> REGIONS: CPT | Performed by: PHYSICAL THERAPIST

## 2022-03-14 NOTE — PROGRESS NOTES
Daily Note     Today's date: 3/13/2022  Patient name: Aura Meza  : 1975  MRN: [id number]  Referring provider: Ayana Faustin  Dx:   Encounter Diagnosis     ICD-10-CM    1  Biceps tendonosis of right shoulder  M67 813    2  Status post subacromial decompression  Z98 890                   Subjective: She states she did have 1 day of soreness following last session  Objective: See treatment diary below      Assessment: Tolerated treatment fair  PROM into flexion was improved to about 110  She may benefit from self stm for charlie mm at nv  Patient would benefit from continued PT      Plan: Continue per plan of care        Goal: 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, sleeping in bed, shoulder not feeling out of place  Precautions: Tamazight speaking, chronic pain, central sensitization, see protocol  Dx:  R biceps tenodesis and SAD- 1/10     Daily Treatment Diary     Manuals 3/11 3/14 2/7 2/11 2/14 3/7   [address] PROM as eloina HOB elevated  10' 10' 10' 10' 8 10'   Scap PROM in L s/l  prn 6'- hold nv   nv 4' nv                     Ther Ex         Table slides flex/abd 20x pbal nv nv       Wand ext 10x nv       Pulleys standing    2 min pain hold declined   Wall slides 20x 20x    20x   C- SB AROM   10x :10 10x :10 10x :10    ER wand  20x :05 10x :05 10x :05 10x :05 10x :05                     IR strap st 10x :05 10x :05    10x :05   Elbow AROM flex nv 20x    20x   Neuro Re-ed         Shoulder iso ER/IR         scap retraction and depression in s/l  20x 20x 10x 20x nv             AROM scaption B 10x 10x                Ther Activity                                    Gait Training                           Modalities         CP  5' 8'   10' 10'

## 2022-03-17 ENCOUNTER — TELEPHONE (OUTPATIENT)
Dept: OBGYN CLINIC | Facility: MEDICAL CENTER | Age: 47
End: 2022-03-17

## 2022-03-17 NOTE — TELEPHONE ENCOUNTER
Patient see Dr Matheus Quiroz at [address] [place] [address] calling to request an updated work note, please fax to [phone]

## 2022-03-18 ENCOUNTER — OFFICE VISIT (OUTPATIENT)
Dept: PHYSICAL THERAPY | Facility: CLINIC | Age: 47
End: 2022-03-18
Payer: COMMERCIAL

## 2022-03-18 DIAGNOSIS — M67.813 BICEPS TENDONOSIS OF RIGHT SHOULDER: ICD-10-CM

## 2022-03-18 DIAGNOSIS — Z98.890 STATUS POST SUBACROMIAL DECOMPRESSION: Primary | ICD-10-CM

## 2022-03-18 PROCEDURE — 97112 NEUROMUSCULAR REEDUCATION: CPT | Performed by: PHYSICAL THERAPIST

## 2022-03-18 PROCEDURE — 97110 THERAPEUTIC EXERCISES: CPT | Performed by: PHYSICAL THERAPIST

## 2022-03-18 PROCEDURE — 97140 MANUAL THERAPY 1/> REGIONS: CPT | Performed by: PHYSICAL THERAPIST

## 2022-03-18 NOTE — TELEPHONE ENCOUNTER
It does not look like this was discussed at the last office visit  I did review Dr Ricketts Milling note  Could you please reach out to the patient and see what she does and if she is currently working

## 2022-03-18 NOTE — PROGRESS NOTES
Daily Note      Today's date: 3/18/2022  Patient name: Bella Duvall  : 1975  MRN: [id number]  Referring provider: Bria Green  Dx:   Encounter Diagnosis     ICD-10-CM    1  Status post subacromial decompression  Z98 890    2  Biceps tendonosis of right shoulder  M67 813                   Subjective: She states she is still having trouble sleeping but is trying to do a bit more  Objective: See treatment diary below      Assessment: Tolerated treatment fair  PROM into flexion was improved to about 120  She felt looser after  self stm for rhomobid mm   Patient would benefit from continued PT  She did have to leave early today due to an appt  Plan: Continue per plan of care        Goal: 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, sleeping in bed, shoulder not feeling out of place  Precautions: [place] speaking, chronic pain, central sensitization, see protocol  Dx:  R biceps tenodesis and SAD- 1/10     Daily Treatment Diary     Manuals 3/11 3/14 3/18 2/11 2/14 3/7   [address] PROM as eloina HOB elevated  10' 10' 13' 10' 8 10'   Scap PROM in L s/l  prn 6'- hold nv   nv 4' nv                     Ther Ex         Table slides flex/abd 20x pbal nv nv       Wand ext 10x nv 10x      Pulleys standing   Consider nv 2 min pain hold declined   Wall slides 20x 20x    20x   C- SB AROM   10x :10 10x :10 10x :10    ER wand  20x :05 10x :05 10x :05 10x :05 10x :05   Supine tennis ball stm rhomboid   2'               IR strap st 10x :05 10x :05    10x :05   Elbow AROM flex nv 20x 20x   20x   Neuro Re-ed         Shoulder iso ER/IR         scap retraction and depression in s/l  20x 20x 10x 20x nv    Finger walk   5x      AROM scaption B 10x 10x 10x               Ther Activity                                    Gait Training                           Modalities         CP  5' 8' 5'  10' 10'

## 2022-03-21 ENCOUNTER — APPOINTMENT (OUTPATIENT)
Dept: PHYSICAL THERAPY | Facility: CLINIC | Age: 47
End: 2022-03-21
Payer: COMMERCIAL

## 2022-03-21 NOTE — TELEPHONE ENCOUNTER
I called patient and LMOM  Patient is Latvian speaking, not sure if she will understand message  I did leave our phone number to call us back

## 2022-03-25 ENCOUNTER — TELEPHONE (OUTPATIENT)
Dept: OBGYN CLINIC | Facility: HOSPITAL | Age: 47
End: 2022-03-25

## 2022-03-25 ENCOUNTER — OFFICE VISIT (OUTPATIENT)
Dept: PHYSICAL THERAPY | Facility: CLINIC | Age: 47
End: 2022-03-25
Payer: COMMERCIAL

## 2022-03-25 DIAGNOSIS — M67.813 BICEPS TENDONOSIS OF RIGHT SHOULDER: Primary | ICD-10-CM

## 2022-03-25 DIAGNOSIS — Z98.890 STATUS POST SUBACROMIAL DECOMPRESSION: ICD-10-CM

## 2022-03-25 PROCEDURE — 97140 MANUAL THERAPY 1/> REGIONS: CPT | Performed by: PHYSICAL THERAPIST

## 2022-03-25 PROCEDURE — 97110 THERAPEUTIC EXERCISES: CPT | Performed by: PHYSICAL THERAPIST

## 2022-03-25 PROCEDURE — 97112 NEUROMUSCULAR REEDUCATION: CPT | Performed by: PHYSICAL THERAPIST

## 2022-03-25 NOTE — TELEPHONE ENCOUNTER
Carmelo Moy  called for a work note for this patient    When completed, please fax to [phone] Attn: Carmelo Moy

## 2022-03-25 NOTE — TELEPHONE ENCOUNTER
As stated in previous task we are trying to get a hold of the patient  Her voicemail is in [address] I have left messages  I also sent a MyChart message to try to have her call the office  I need to speak to the patient before we can fax anything to Work Comp

## 2022-03-25 NOTE — PROGRESS NOTES
Daily Note      Today's date: 3/25/2022  Patient name: Raquel Mario  : 1975  MRN: [id number]  Referring provider: Chava Found  Dx:   Encounter Diagnosis     ICD-10-CM    1  Biceps tendonosis of right shoulder  M67 813    2  Status post subacromial decompression  Z98 890                   Subjective: She states she is using 2 pillows for her head and one for her arm  Objective: See treatment diary below      Assessment: Tolerated treatment fair  trialed neck stretching today to see if it could limit her pain  Patient would benefit from continued PT  Will progress as tolerable  Plan: Continue per plan of care        Goal: 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, sleeping in bed, shoulder not feeling out of place  Precautions: Northern Irish speaking, chronic pain, central sensitization, see protocol  Dx:  R biceps tenodesis and SAD- 1/10     Daily Treatment Diary     Manuals 3/11 3/14 3/18 3/25 2/14 3/7   [address] PROM as eloina HOB elevated  10' 10' 13' 7' 8 10'   Scap PROM in L s/l  prn 6'- hold nv    4' nv   C- PROM L SB and L rot    8' c Rib 3-4 post glide gr2              Ther Ex         Table slides flex/abd 20x pbal nv nv  10x     Wand ext 10x nv 10x 10x     Pulleys standing   Consider nv 3' standing hold declined   Wall slides 20x 20x  20x  20x   C- SB AROM   10x :10  10x :10    ER wand  20x :05 10x :05 10x :05 10x :05 10x :05   Supine tennis ball stm rhomboid   2' 3'              IR strap st 10x :05 10x :05  10x :05  10x :05   Elbow AROM flex nv 20x 20x   20x   Neuro Re-ed         Shoulder iso ER/IR         scap retraction and depression in s/l  20x 20x 10x  nv    Finger walk   5x      AROM scaption B 10x 10x 10x 10x              Ther Activity                                    Gait Training                           Modalities         CP  5' 8' 5' 10' 10' 10'

## 2022-03-28 ENCOUNTER — OFFICE VISIT (OUTPATIENT)
Dept: PHYSICAL THERAPY | Facility: CLINIC | Age: 47
End: 2022-03-28
Payer: COMMERCIAL

## 2022-03-28 DIAGNOSIS — M67.813 BICEPS TENDONOSIS OF RIGHT SHOULDER: Primary | ICD-10-CM

## 2022-03-28 DIAGNOSIS — Z98.890 STATUS POST SUBACROMIAL DECOMPRESSION: ICD-10-CM

## 2022-03-28 PROCEDURE — 97110 THERAPEUTIC EXERCISES: CPT

## 2022-03-28 PROCEDURE — 97112 NEUROMUSCULAR REEDUCATION: CPT

## 2022-03-28 PROCEDURE — 97140 MANUAL THERAPY 1/> REGIONS: CPT

## 2022-03-28 NOTE — PROGRESS NOTES
Daily Note      Today's date: 3/28/2022  Patient name: Mary Ann Alvarez  : 1975  MRN: [id number]  Referring provider: Mila Interiano  Dx:   Encounter Diagnosis     ICD-10-CM    1  Biceps tendonosis of right shoulder  M67 813    2  Status post subacromial decompression  Z98 890                   Subjective: Pt states her shoulder is feeling good today  She is trying to sleep flat in bed and using 1 pillow to support R arm  Objective: See treatment diary below      Assessment: Tolerated treatment fair  Difficulty with ext and IR, small ROM  Mild pain reported t/o treatment which does not prevent pt from completing exercises  Notes pain relief following ice at end of tx  Will continue to progress as tolerated  Patient would benefit from continued PT  Plan: Continue per plan of care        Goal: 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, sleeping in bed, shoulder not feeling out of place  Precautions: Icelandic speaking, chronic pain, central sensitization, see protocol  Dx:  R biceps tenodesis and SAD- 1/10     Daily Treatment Diary     Manuals 3/11 3/14 3/18 3/25 3/28 3/7   [facility] PROM as eloina HOB elevated  10' 10' 13' 7' 8' 10'   Scap PROM in L s/l  prn 6'- hold nv     nv   C- PROM L SB and L rot    8' c Rib 3-4 post glide gr2              Ther Ex         Table slides flex/abd 20x pbal nv nv  10x 10x    Wand ext 10x nv 10x 10x 10x    Pulleys standing   Consider nv 3' standing 3' standing declined   Wall slides 20x 20x  20x 20x 20x   C- SB AROM   10x :10      ER wand  20x :05 10x :05 10x :05 10x :05 10x :05   Supine tennis ball stm rhomboid   2' 3'              IR strap st 10x :05 10x :05  10x :05 10x :05 10x :05   Elbow AROM flex nv 20x 20x   20x   Neuro Re-ed         Shoulder iso ER/IR         scap retraction and depression in s/l  20x 20x 10x  10x    Finger walk   5x      AROM scaption B 10x 10x 10x 10x 10x             Ther Activity                                    Gait Training                           Modalities         CP  5' 8' 5' 10'  10'

## 2022-03-30 NOTE — TELEPHONE ENCOUNTER
Attempted to contact patient again  LVM  Patient is Turkmen speaking  We have made multiple calls at this point  Patient is scheduled for apt on 04/11

## 2022-04-01 ENCOUNTER — APPOINTMENT (OUTPATIENT)
Dept: PHYSICAL THERAPY | Facility: CLINIC | Age: 47
End: 2022-04-01
Payer: COMMERCIAL

## 2022-04-04 ENCOUNTER — APPOINTMENT (OUTPATIENT)
Dept: PHYSICAL THERAPY | Facility: CLINIC | Age: 47
End: 2022-04-04
Payer: COMMERCIAL

## 2022-04-04 NOTE — PROGRESS NOTES
Daily Note      Today's date: 4/3/2022  Patient name: Robin Hartley  : 1975  MRN: [id number]  Referring provider: Krysten Li  Dx:   Encounter Diagnosis     ICD-10-CM    1  Biceps tendonosis of right shoulder  M67 813    2  Status post subacromial decompression  Z98 890                   Subjective: Pt states her shoulder is feeling good today  She is trying to sleep flat in bed and using 1 pillow to support R arm  Objective: See treatment diary below      Assessment: Tolerated treatment fair  Difficulty with ext and IR, small ROM  Mild pain reported t/o treatment which does not prevent pt from completing exercises  Notes pain relief following ice at end of tx  Will continue to progress as tolerated  Patient would benefit from continued PT  Plan: Continue per plan of care        Goal: 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, sleeping in bed, shoulder not feeling out of place  Precautions: Turkmen speaking, chronic pain, central sensitization, see protocol  Dx:  R biceps tenodesis and SAD- 1/10     Daily Treatment Diary     Manuals 3/11 3/14 3/18 3/25 3/28 3/7   [facility] PROM as eloina HOB elevated  10' 10' 13' 7' 8' 10'   Scap PROM in L s/l  prn 6'- hold nv     nv   C- PROM L SB and L rot    8' c Rib 3-4 post glide gr2              Ther Ex         Table slides flex/abd 20x pbal nv nv  10x 10x    Wand ext 10x nv 10x 10x 10x    Pulleys standing   Consider nv 3' standing 3' standing declined   Wall slides 20x 20x  20x 20x 20x   C- SB AROM   10x :10      ER wand  20x :05 10x :05 10x :05 10x :05 10x :05   Supine tennis ball stm rhomboid   2' 3'              IR strap st 10x :05 10x :05  10x :05 10x :05 10x :05   Elbow AROM flex nv 20x 20x   20x   Neuro Re-ed         Shoulder iso ER/IR         scap retraction and depression in s/l  20x 20x 10x  10x    Finger walk   5x      AROM scaption B 10x 10x 10x 10x 10x             Ther Activity                                    Gait Training                           Modalities         CP  5' 8' 5' 10'  10'

## 2022-04-07 NOTE — TELEPHONE ENCOUNTER
If work comp calls back please advise we are trying to call the patient with questions before we do a work status note we are unable to communicate with the patient due to patient being Kyrgyz speaking  I would have called Aliya Foreman back but in prior phone messages there is no phone number to call her  I saw one phone number in a telephone message a while ago different agent  I was unable to tell Aliya Foreman that we will have to wait till Gilma Vidal comes into the office to discuss this

## 2022-04-08 ENCOUNTER — OFFICE VISIT (OUTPATIENT)
Dept: PHYSICAL THERAPY | Facility: CLINIC | Age: 47
End: 2022-04-08
Payer: COMMERCIAL

## 2022-04-08 DIAGNOSIS — Z98.890 STATUS POST SUBACROMIAL DECOMPRESSION: Primary | ICD-10-CM

## 2022-04-08 DIAGNOSIS — M67.813 BICEPS TENDONOSIS OF RIGHT SHOULDER: ICD-10-CM

## 2022-04-08 PROCEDURE — 97112 NEUROMUSCULAR REEDUCATION: CPT | Performed by: PHYSICAL THERAPIST

## 2022-04-08 PROCEDURE — 97110 THERAPEUTIC EXERCISES: CPT | Performed by: PHYSICAL THERAPIST

## 2022-04-08 PROCEDURE — 97140 MANUAL THERAPY 1/> REGIONS: CPT | Performed by: PHYSICAL THERAPIST

## 2022-04-08 NOTE — PROGRESS NOTES
PT Re-Evaluation    Today's date: 2022  Patient name: Kelli Jeffers  : 1975  MRN: [id number]  Referring provider: Kristen Duran  Dx:   Encounter Diagnosis     ICD-10-CM    1  Status post subacromial decompression  Z98 890    2  Biceps tendonosis of right shoulder  M67 813            Subjective Evaluation     History of Present Illness    She states she is doing better sleeping as she sleeps 8 hours but does wake up  She still uses 2 pillows and 1 underneath her arm  She is trying to cut things in the kitchen  She is slowly progressing c her activity  Hx of injury:  Pt is a 52 yo female who presents with her  Yokasta Burton s/p subacromial decompression and LHBT tenodesis arthroscopically on 1/10/22  She had pain for 1 year shoulder pain from repetitive lifting and twisting at her work at a [employer]  She first went to [facility] in 2021  She denies electrical symptoms but the fingers do swell  Patient presents in sling and states MD mentioned not to actively raise it for 5 weeks  She has done this in the shower and she feels as if it gets stuck and has trouble bringing it back down  This sensation also happens when she sleeps and the arm falls in front of her or behind her in the sling  She has not yet transitioned to the bed  This has been tough for her because she is used to doing 10 things at one time but now is not very independent  Surgical note: "Long head of biceps tendon had a highly unstable anchor on the superior labrum where there was a fairly some substantial tear  Anterior labrum posterior labrum were intact  The long head of biceps tendon also had a partial tear along its course  We thus decided to perform a tenodesis due to the instability at the long head of biceps anchor  We had a stable tenodesis at the end of the procedure    We also demonstrated on the bursal side that there was partial-thickness tearing of the supraspinatus tendon upon which we performed a debridement with a shaver    acromioplasty for subacromial decompression          Neuro signs: none  Red flags: none  Occupation: - disability      Pain  At best pain ratin  At worst pain ratin  Location: ant shoulder and scapular pain  Quality: burning  Relieving factors: tylenol, ibuprofen, ice  Aggravating factors: lifting arm,     Social Support  Lives with 2 kids at home and her  who helps quite a bit      Patient Goals  Patient goals for therapy: Get back to what she was normally doing    STGs  1  Decrease pain by 20% in 2-4 weeks  -  met  2  Improve shoulder ROM to protocol standards in 2-4 weeks  - not met  3  Improve isometric strength s pain in 2-4 weeks  - met      LTGs  1  Decrease pain by 60% in 6-8 weeks  2  Improve overhead reaching tolerance to cabinet height in 10-12 weeks  3  Perform job activities without pain in 6-8 weeks  Objective Measurements:  Observation: arm in guarded position    Sensation: NT today    GH ext c supination neutral  GH ext c pronation 10 deg lacking elbow ext      DEIDRE: unable to tolerate [facility] mobs  Palpation: TTP to infraspinatus and teres major hypersensitivity to touch    Cervical ROM L R Strength L R   Flex   WFL   Mid trap     Ext  painful in R UT   Low trap     SB        Rotation   [facility]      Shoulder A/PROM        Flex  / 101p /125p Flex     Abd  /  73p/105p Abd     ER  / 43p/44p 2 pillows ER  4   IR scratch  C3/0 IR  4   ER scratch  R glut/65      Wrist flex  65 flex  4p   ext  65 ext  4+   Elbow ext  5 lacking      supination  Lacking 10 deg              Assessment:  Arianna Rosenthal has demonstrated overall improvement in ROM, strength, function, and a reduction in pain  Currently, she has made progress towards her goals, but continues to remain limited with Decreased shoulder strength/ROM, Hypersensitivity to touch, poor scapular control, postural deficits   Her progress has been slow compared to protocol standards due to her hypersensitivity and chronic pain although she does demonstrate good motivation  She will need more visits compared to someone with a similar diagnosis due to this aspect but she has done a good job with goal setting  At this time, skilled physical therapy is warranted to address the remaining impairments and aide in return to dressing, driving, work, cooking, and sleeping in bed s pain  Perhaps more pain science education could compliment her progressive ROM to help reduce hypersensitivity  It does not seem likely for her to return to work at this phase within the next month due to significant ROM/strength limitation and hypersensitivity  However, we will still aim to restore best functional status possible  Limiting factors to therapy central sensitization and chronicity of sxs and she  demonstrates good motivation  Plan  Patient would benefit from:Skilled physical therapy  Planned therapy interventions: manual therapy, neuromuscular re-education, stretching, strengthening, therapeutic activities, therapeutic exercise, patient education, home exercise program, and activity modification      Frequency: 2x week  Duration in weeks:12  Treatment plan discussed with: patient       Goal: 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, sleeping in bed, shoulder not feeling out of place  Precautions: Kyrgyz speaking, chronic pain, central sensitization, see protocol  Dx:  R biceps tenodesis and SAD- 1/10     Daily Treatment Diary     Manuals 3/11 3/14 3/18 3/25 3/28 4/8   [address] PROM as eloina HOB elevated  10' 10' 13' 7' 8' 10'   Scap PROM in L s/l  prn 6'- hold nv     nv   C- PROM L SB and L rot    8' c Rib 3-4 post glide gr2              Ther Ex         Table slides flex/abd 20x pbal nv nv  10x 10x pball nv   Wand ext 10x nv 10x 10x 10x 10x   Pulleys standing   Consider nv 3' standing 3' standing 3'   Wall slides 20x 20x  20x 20x 20x   C- SB AROM   10x :10      ER wand  20x :05 10x :05 10x :05 10x :05 10x :05   Supine tennis ball stm rhomboid   2' 3'     Elbow ext c arm in supination       10x :05   IR strap st 10x :05 10x :05  10x :05 10x :05 10x :05   Elbow AROM flex nv 20x 20x   20x #1   Neuro Re-ed         Shoulder iso ER/IR         scap retraction and depression in s/l  20x 20x 10x  10x    Finger walk   5x      AROM scaption B 10x 10x 10x 10x 10x 10x   Radial nerve st      10x :05   Ther Activity                                    Gait Training                           Modalities         CP  c ER st 5' 8' 5' 10'  10'

## 2022-04-11 ENCOUNTER — OFFICE VISIT (OUTPATIENT)
Dept: OBGYN CLINIC | Facility: CLINIC | Age: 47
End: 2022-04-11
Payer: OTHER MISCELLANEOUS

## 2022-04-11 VITALS — HEIGHT: 61 IN | BODY MASS INDEX: 37.19 KG/M2 | WEIGHT: 197 LBS

## 2022-04-11 DIAGNOSIS — Z98.890 S/P ARTHROSCOPY OF RIGHT SHOULDER: Primary | ICD-10-CM

## 2022-04-11 PROCEDURE — 99213 OFFICE O/P EST LOW 20 MIN: CPT | Performed by: ORTHOPAEDIC SURGERY

## 2022-04-11 NOTE — PROGRESS NOTES
Assessment/Plan:  1  S/P arthroscopy of right shoulder  Ambulatory Referral to Physical Therapy       Scribe Attestation    I,:  Joanna McDuffie Call am acting as a scribe while in the presence of the attending physician :       I,:  Nakita Pichardo MD personally performed the services described in this documentation    as scribed in my presence :             [facility] physical therapy reports were reviewed  She is showing slow improvement  In therapy they will address strengthening and functional range of motion  She will remain on the biceps tenodesis protocol  In my opinion she cannot return to work at this time due to the demands of her occupation  She will be out of work for another 6 weeks  She will follow up with me in 6 weeks  Subjective:   Kylie Shannon is a 51 y o  female who presents to the office today for postop evaluation of the right shoulder biceps tenodesis, limited debridement of rotator cuff and subacromial decompression performed 3 months ago  She states she is doing fairly well  She still is having some pain located about the anterior aspect of the right shoulder that will radiate into the right elbow region  She has been attending physical therapy  Increase activity will exacerbate her symptoms  She is sleeping somewhat better than originally  She is better with rest and the use of Tylenol  She denies distal paresthesias      Review of Systems   Constitutional: Negative for chills, fever and unexpected weight change  HENT: Negative for hearing loss, nosebleeds and sore throat  Eyes: Negative for pain, redness and visual disturbance  Respiratory: Negative for cough, shortness of breath and wheezing  Cardiovascular: Negative for chest pain, palpitations and leg swelling  Gastrointestinal: Negative for abdominal pain, nausea and vomiting  Endocrine: Negative for polydipsia and polyuria  Genitourinary: Negative for dysuria and hematuria     Musculoskeletal: See HPI   Skin: Negative for rash and wound  Neurological: Negative for dizziness, numbness and headaches  Psychiatric/Behavioral: Negative for decreased concentration and suicidal ideas  The patient is not nervous/anxious            Past Medical History:   Diagnosis Date    Gestational diabetes        Past Surgical History:   Procedure Laterality Date     SECTION       SECTION      MT SHLDR ARTHROSCOP,SURG,W/ROTAT CUFF REPR Right 1/10/2022    Procedure: DIAGNOSTIC ARTHROSCOPY WITH BICEPS TENODESIS, SUBACROMIAL DECOMPRESSION, AND LIMITED DEBRIDEMENT;  Surgeon: Prudence Ybarra MD;  Location: [facility];  Service: Orthopedics       Family History   Problem Relation Age of Onset    No Known Problems Mother     Lung cancer Father 62        smoker    No Known Problems Daughter     Stomach cancer Maternal Grandmother     No Known Problems Maternal Grandfather     Stomach cancer Paternal Grandmother     No Known Problems Paternal Grandfather     No Known Problems Daughter     No Known Problems Brother     No Known Problems Brother     No Known Problems Brother     No Known Problems Brother     No Known Problems Brother     No Known Problems Sister        Social History     Occupational History    Not on file   Tobacco Use    Smoking status: Never Smoker    Smokeless tobacco: Never Used   Vaping Use    Vaping Use: Never used   Substance and Sexual Activity    Alcohol use: No    Drug use: No    Sexual activity: Yes     Partners: Male     Birth control/protection: Injection         Current Outpatient Medications:     medroxyPROGESTERone (DEPO-PROVERA) 150 mg/mL injection, Inject 1 mL (150 mg total) into a muscle every 3 (three) months, Disp: 1 mL, Rfl: 4    oxyCODONE (Roxicodone) 5 immediate release tablet, Take 1 tablet (5 mg total) by mouth every 4 (four) hours as needed for moderate pain for up to 15 doses Max Daily Amount: 30 mg (Patient not taking: Reported on 1/19/2022 ), Disp: 15 tablet, Rfl: 0    Current Facility-Administered Medications:     medroxyPROGESTERone (DEPO-PROVERA) IM injection 150 mg, 150 mg, Intramuscular, Q3 Months, PETER Iverson, 150 mg at 01/31/22 1336    No Known Allergies    Objective: There were no vitals filed for this visit  Right Shoulder Exam     Range of Motion   Active abduction: 90   External rotation: 40   Forward flexion: 90   Internal rotation 90 degrees: 30     Muscle Strength   Abduction: 3/5   Internal rotation: 5/5   External rotation: 4/5     Other   Sensation: normal  Pulse: present (2+ radial)    Comments:  Surgical incisions have healed well  Physical Exam  Vitals reviewed  Constitutional:       Appearance: She is well-developed  HENT:      Head: Normocephalic and atraumatic  Eyes:      General:         Right eye: No discharge  Left eye: No discharge  Conjunctiva/sclera: Conjunctivae normal    Cardiovascular:      Rate and Rhythm: Regular rhythm  Pulmonary:      Effort: Pulmonary effort is normal  No respiratory distress  Breath sounds: No stridor  Musculoskeletal:      Cervical back: Normal range of motion and neck supple  Skin:     General: Skin is warm and dry  Neurological:      Mental Status: She is alert and oriented to person, place, and time     Psychiatric:         Behavior: Behavior normal

## 2022-04-11 NOTE — LETTER
April 11, 2022     Patient: Donnell Stein   YOB: 1975   Date of Visit: 4/11/2022       To Whom it May Concern:    Donnell Holley is under my professional care  She was seen in my office on 4/11/2022  She remains restricted from work  Next visit in 6 weeks  If you have any questions or concerns, please don't hesitate to call           Sincerely,          Emily Mcdermott MD        CC: No Recipients

## 2022-04-14 ENCOUNTER — TELEPHONE (OUTPATIENT)
Dept: OBGYN CLINIC | Facility: HOSPITAL | Age: 47
End: 2022-04-14

## 2022-04-14 NOTE — TELEPHONE ENCOUNTER
Tanya Vasques from [address] [place] [address] w/c called to request 4/11/22 ovn and w/s letters  Faxed to #[phone]  Tanya Vasques states they can accommodate patient for light duty work  She's asking if you can estimate when patient will be light duty/full duty      Callback #[phone]

## 2022-04-15 ENCOUNTER — APPOINTMENT (OUTPATIENT)
Dept: PHYSICAL THERAPY | Facility: CLINIC | Age: 47
End: 2022-04-15
Payer: COMMERCIAL

## 2022-04-15 NOTE — TELEPHONE ENCOUNTER
I was able to speak to the patients  slowly to advise we will be writing a letter to return desk duty only and to allow her to attend her PT visits twice a week  He verbalized understanding

## 2022-04-15 NOTE — TELEPHONE ENCOUNTER
According to Dr Crystal Connolly he stated she is restricted from work for the next 4 weeks until follow up  This is under the letters tab from 04/11/2022

## 2022-04-18 ENCOUNTER — OFFICE VISIT (OUTPATIENT)
Dept: PHYSICAL THERAPY | Facility: CLINIC | Age: 47
End: 2022-04-18
Payer: COMMERCIAL

## 2022-04-18 DIAGNOSIS — Z98.890 STATUS POST SUBACROMIAL DECOMPRESSION: ICD-10-CM

## 2022-04-18 DIAGNOSIS — M67.813 BICEPS TENDONOSIS OF RIGHT SHOULDER: Primary | ICD-10-CM

## 2022-04-18 PROCEDURE — 97140 MANUAL THERAPY 1/> REGIONS: CPT | Performed by: PHYSICAL THERAPIST

## 2022-04-18 PROCEDURE — 97112 NEUROMUSCULAR REEDUCATION: CPT | Performed by: PHYSICAL THERAPIST

## 2022-04-18 PROCEDURE — 97110 THERAPEUTIC EXERCISES: CPT | Performed by: PHYSICAL THERAPIST

## 2022-04-18 NOTE — PROGRESS NOTES
Daily Note     Today's date: 2022  Patient name: Sebastian Terrell  : 1975  MRN: [id number]  Referring provider: Romeo Kruse  Dx:   Encounter Diagnosis     ICD-10-CM    1  Biceps tendonosis of right shoulder  M67 813    2  Status post subacromial decompression  Z98 890                   Subjective: She states she saw MD and will be out of work for some time, however her  was confused about possibly needing to return to work as the work note was different from the one prior  Objective: See treatment diary below      Assessment: Tolerated treatment well  She still is quite limited c IR PROM lacking even neutral  Patient would benefit from continued PT      Plan: Continue per plan of care        Goal: 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, sleeping in bed, shoulder not feeling out of place  Precautions: English speaking, chronic pain, central sensitization, see protocol  Dx:  R biceps tenodesis and SAD- 1/10     Daily Treatment Diary     Manuals 4/18 3/14 3/18 3/25 3/28 4/8   [address] PROM as eloina HOB elevated  10' 10' 13' 7' 8' 10'   Scap PROM in L s/l  prn nv     nv   C- PROM L SB and L rot    8' c Rib 3-4 post glide gr2              Ther Ex         Table slides flex/abd  nv  10x 10x pball nv   Wand ext 10x nv 10x 10x 10x 10x   Pulleys standing 3'/ sitting  Consider nv 3' standing 3' standing 3'   Wall slides 20x 20x  20x 20x 20x   C- SB AROM   10x :10      ER wand 20x :05 20x :05 10x :05 10x :05 10x :05 10x :05   Supine tennis ball stm rhomboid 2'  2' 3'     Elbow ext c arm in supination       10x :05   IR strap st 10x :05 10x :05  10x :05 10x :05 10x :05   Elbow AROM flex nv 20x 20x   20x #1   Neuro Re-ed         Shoulder iso ER/IR         scap retraction and depression in s/l   20x 10x  10x    Finger walk 10x  5x      AROM scaption B 10x 10x 10x 10x 10x 10x   Radial nerve st nv     10x :05   Ther Activity                                    Gait Training Modalities         CP  c ER st 10' 8' 5' 10'  10'

## 2022-04-22 ENCOUNTER — CLINICAL SUPPORT (OUTPATIENT)
Dept: OBGYN CLINIC | Facility: CLINIC | Age: 47
End: 2022-04-22

## 2022-04-22 ENCOUNTER — OFFICE VISIT (OUTPATIENT)
Dept: PHYSICAL THERAPY | Facility: CLINIC | Age: 47
End: 2022-04-22
Payer: COMMERCIAL

## 2022-04-22 DIAGNOSIS — Z98.890 STATUS POST SUBACROMIAL DECOMPRESSION: ICD-10-CM

## 2022-04-22 DIAGNOSIS — M67.813 BICEPS TENDONOSIS OF RIGHT SHOULDER: Primary | ICD-10-CM

## 2022-04-22 DIAGNOSIS — Z30.42 ENCOUNTER FOR DEPO-PROVERA CONTRACEPTION: ICD-10-CM

## 2022-04-22 PROCEDURE — 97110 THERAPEUTIC EXERCISES: CPT | Performed by: PHYSICAL THERAPIST

## 2022-04-22 PROCEDURE — 96372 THER/PROPH/DIAG INJ SC/IM: CPT

## 2022-04-22 PROCEDURE — 97140 MANUAL THERAPY 1/> REGIONS: CPT | Performed by: PHYSICAL THERAPIST

## 2022-04-22 RX ADMIN — MEDROXYPROGESTERONE ACETATE 150 MG: 150 INJECTION, SUSPENSION INTRAMUSCULAR at 11:51

## 2022-04-22 NOTE — PROGRESS NOTES
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, sleeping in bed, shoulder not feeling out of place  Precautions: Japanese speaking, chronic pain, central sensitization, see protocol  Dx:  R biceps tenodesis and SAD- 1/10     Daily Treatment Diary     Manuals 4/18 4/22 3/18 3/25 3/28 4/8   [facility] PROM as eloina HOB elevated  10' 10' 13' 7' 8' 10'   Scap PROM in L s/l  prn nv 5'    nv   C- PROM L SB and L rot    8' c Rib 3-4 post glide gr2              Ther Ex                  Wand ext 10x nv 10x 10x 10x 10x   Pulleys standing 3'/ sitting 3/3 Consider nv 3' standing 3' standing 3'   Wall slides 20x 20x  20x 20x 20x   C- SB AROM   10x :10      ER wand 20x :05 20x :05 10x :05 10x :05 10x :05 10x :05   Supine tennis ball stm rhomboid 2'  2' 3'     Elbow ext c arm in supination       10x :05   IR strap st 10x :05 10x :05  10x :05 10x :05 10x :05   Elbow AROM flex nv 20x 20x   20x #1   Neuro Re-ed         Shoulder iso ER/IR         scap retraction and depression in s/l   20x 10x  10x    Finger walk 10x 10x  5x      AROM scaption B 10x 10x  L 16 10x 10x 10x 10x   Radial nerve st nv nv    10x :05   Ther Activity                                    Gait Training                           Modalities         CP  c ER st 10' 5' 5' 10'  10'

## 2022-04-22 NOTE — PROGRESS NOTES
Depo-Provera      [x]   Patient provided box    o 2 Refills remain  o Refills submitted no   Last  Annual Date / Birth control check : 07/29/2021   Last Depo date: 01/31/2022   Side effects: no   HCG: no     Given by: Kiara Cruz MA   Site: Left Deltoid    o Calcium supplement daily teaching  o Condoms for 2 weeks following first injection dose

## 2022-04-29 ENCOUNTER — OFFICE VISIT (OUTPATIENT)
Dept: PHYSICAL THERAPY | Facility: CLINIC | Age: 47
End: 2022-04-29
Payer: COMMERCIAL

## 2022-04-29 DIAGNOSIS — M67.813 BICEPS TENDONOSIS OF RIGHT SHOULDER: Primary | ICD-10-CM

## 2022-04-29 DIAGNOSIS — Z98.890 STATUS POST SUBACROMIAL DECOMPRESSION: ICD-10-CM

## 2022-04-29 PROCEDURE — 97140 MANUAL THERAPY 1/> REGIONS: CPT | Performed by: PHYSICAL THERAPIST

## 2022-04-29 PROCEDURE — 97110 THERAPEUTIC EXERCISES: CPT | Performed by: PHYSICAL THERAPIST

## 2022-04-29 PROCEDURE — 97112 NEUROMUSCULAR REEDUCATION: CPT | Performed by: PHYSICAL THERAPIST

## 2022-04-29 NOTE — PROGRESS NOTES
Daily Note     Today's date: 2022  Patient name: Gladys Alpers  : 1975  MRN: [id number]  Referring provider: Mallika Gupta  Dx:   Encounter Diagnosis     ICD-10-CM    1  Biceps tendonosis of right shoulder  M67 813    2  Status post subacromial decompression  Z98 890                   Subjective: She states she recently got a dog  She has not been called from the work so she is thinking this means there is nothing for her to do at her work  She finds in the Am she wakes up and raises it to abduction c IR/ER  Objective: See treatment diary below      Assessment: Tolerated treatment well  [address] IR progressed today with difficulty and she did get shoulder locking(getting stuck) and then needed assistance to get it back to where it was comfortable  She did have to leave early today to attend another appt  Patient would benefit from continued PT      Plan: Continue per plan of care        Goal: 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, sleeping in bed, shoulder not feeling out of place  Precautions: Vietnamese speaking, chronic pain, central sensitization, see protocol  Dx:  R biceps tenodesis and SAD- 1/10     Daily Treatment Diary     Manuals 4/18 4/22 4/29 3/25 3/28 4/8   [facility] PROM as eloina HOB elevated  10' 10' 10' 7' 8' 10'   Scap PROM in L s/l  prn nv 5'    nv   C- PROM L SB and L rot    8' c Rib 3-4 post glide gr2              Ther Ex         S/l ER   2x10      Wand ext 10x nv 10x 10x 10x 10x   Pulleys standing 3'/ sitting 3/3 3/3 3' standing 3' standing 3'   Wall slides 20x 20x  20x 20x 20x   C- SB AROM   10x :10      ER wand 20x :05 20x :05 10x :05 10x :05 10x :05 10x :05   Supine tennis ball stm rhomboid 2'  2' 3'     Elbow ext c arm in supination       10x :05   IR strap st 10x :05 10x :05 10x :05 10x :05 10x :05 10x :05   Elbow AROM flex nv 20x 20x   20x #1   Neuro Re-ed         Shoulder iso ER/IR         scap retraction and depression in s/l   20x 10x  10x Finger walk 10x 10x  5x      AROM scaption B 10x 10x  L 16 10x 10x 10x 10x   Radial nerve st nv nv nv   10x :05   Ther Activity                                    Gait Training                           Modalities         CP  c ER st 10' 5'  10'  10'

## 2022-05-06 ENCOUNTER — OFFICE VISIT (OUTPATIENT)
Dept: PHYSICAL THERAPY | Facility: CLINIC | Age: 47
End: 2022-05-06
Payer: COMMERCIAL

## 2022-05-06 DIAGNOSIS — M67.813 BICEPS TENDONOSIS OF RIGHT SHOULDER: Primary | ICD-10-CM

## 2022-05-06 DIAGNOSIS — Z98.890 STATUS POST SUBACROMIAL DECOMPRESSION: ICD-10-CM

## 2022-05-06 PROCEDURE — 97112 NEUROMUSCULAR REEDUCATION: CPT | Performed by: PHYSICAL THERAPIST

## 2022-05-06 PROCEDURE — 97110 THERAPEUTIC EXERCISES: CPT | Performed by: PHYSICAL THERAPIST

## 2022-05-06 PROCEDURE — 97140 MANUAL THERAPY 1/> REGIONS: CPT | Performed by: PHYSICAL THERAPIST

## 2022-05-06 NOTE — PROGRESS NOTES
Daily Note     Today's date: 2022  Patient name: Donnell Stein  : 1975  MRN: [id number]  Referring provider: Leonarda Bolaños  Dx:   Encounter Diagnosis     ICD-10-CM    1  Biceps tendonosis of right shoulder  M67 813    2  Status post subacromial decompression  Z98 890                   Subjective: She states she is still sleeping c 3 pillows behind head and 1 pillow underneath elbow      Objective: See treatment diary below      Assessment: Tolerated treatment well  [address] ER was about 80 deg but then she had difficulty c IR PROM  Focused intervention on nerve stretching and IR AAROM  Patient would benefit from continued PT      Plan: Continue per plan of care        Goal: 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, sleeping in bed, shoulder not feeling out of place  Precautions: Eritrean speaking, chronic pain, central sensitization, see protocol  Dx:  R biceps tenodesis and SAD- 1/10     Daily Treatment Diary     Manuals 4/18 4/22 4/29 5/6 3/28 4/8   [facility] PROM as eloina HOB elevated  10' 10' 10' 10 8' 10'   Scap PROM in L s/l  prn nv 5'    nv   C- PROM L SB and L rot                  Ther Ex         Sleeper wall st    3x :10              S/l ER and abd   2x10 10x     Wand ext 10x nv 10x 10x 10x 10x   Pulleys standing 3'/ sitting 3/3 3/3 3' standing 3' standing 3'   Wall slides 20x 20x  20x 20x 20x   C- SB AROM   10x :10 nv     ER wand 20x :05 20x :05 10x :05 10x :05 10x :05 10x :05   Supine tennis ball stm rhomboid 2'  2' 2'     Elbow ext c arm in supination       10x :05   IR strap st 10x :05 10x :05 10x :05 10x :05 10x :05 10x :05   Elbow AROM flex nv 20x 20x nv  20x #1   Neuro Re-ed         Shoulder iso ER/IR         scap retraction and depression in s/l   20x 10x  10x    Finger walk 10x 10x  5x      AROM scaption B 10x 10x  L 16 10x 10x 10x 10x   Radial nerve st nv nv nv 10x  10x :05   Ther Activity                                    Gait Training Modalities         CP  c ER st 10' 5'  10'  10'

## 2022-05-09 ENCOUNTER — OFFICE VISIT (OUTPATIENT)
Dept: PHYSICAL THERAPY | Facility: CLINIC | Age: 47
End: 2022-05-09
Payer: COMMERCIAL

## 2022-05-09 DIAGNOSIS — M67.813 BICEPS TENDONOSIS OF RIGHT SHOULDER: ICD-10-CM

## 2022-05-09 DIAGNOSIS — Z98.890 STATUS POST SUBACROMIAL DECOMPRESSION: Primary | ICD-10-CM

## 2022-05-09 PROCEDURE — 97112 NEUROMUSCULAR REEDUCATION: CPT | Performed by: PHYSICAL THERAPIST

## 2022-05-09 PROCEDURE — 97140 MANUAL THERAPY 1/> REGIONS: CPT | Performed by: PHYSICAL THERAPIST

## 2022-05-09 PROCEDURE — 97110 THERAPEUTIC EXERCISES: CPT | Performed by: PHYSICAL THERAPIST

## 2022-05-09 NOTE — PROGRESS NOTES
Daily Note     Today's date: 2022  Patient name: Nancy Pickering  : 1975  MRN: [id number]  Referring provider: Venkat Carson PA-C  Dx:   No diagnosis found  Subjective: She states she had a tough night on Saturday as she did not sleep in her bed  She did not feel worse after last session  Objective: See treatment diary below      Assessment: Tolerated treatment well  IR PROM at 90 still lacking about 30 deg from neutral  Discussed progression of IR stretching c sleeping c her hand not guarded by her other hand  Noticed roll had to be supported at scapula so scapular protrusion was present  Patient would benefit from continued PT      Plan: Continue per plan of care        Goal: 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, sleeping in bed, shoulder not feeling out of place  Precautions: Martiniquais speaking, chronic pain, central sensitization, see protocol  Dx:  R biceps tenodesis and SAD- 1/10     Daily Treatment Diary     Manuals    [facility] PROM as eloina HOB elevated  10' 10' 10' 10 10' 10'   Scap PROM in L s/l  prn nv 5'   nv nv   C- PROM L SB and L rot                  Ther Ex         Sleeper wall st    3x :10 3x :10             S/l ER and abd   2x10 10x 10x    Wand ext 10x nv 10x 10x 10x 10x   Pulleys standing 3'/ sitting 3/3 3/3 3' standing 3' standing 3'   Wall slides 20x 20x  20x 20x 20x   C- SB AROM   10x :10 nv 10x :10    ER wand 20x :05 20x :05 10x :05 10x :05 10x :05 10x :05   Supine tennis ball stm rhomboid 2'  2' 2'     Elbow ext c arm in supination       10x :05   IR strap st 10x :05 10x :05 10x :05 10x :05 10x :05 10x :05   Elbow AROM flex nv 20x 20x nv  20x #1   Neuro Re-ed         Shoulder iso ER/IR         scap retraction and depression in s/l   20x 10x      Finger walk 10x 10x  5x      AROM scaption B 10x 10x  L 16 10x 10x 10x 10x   Radial nerve st nv nv nv 10x 10x 10x :05   Ther Activity Gait Training                           Modalities         CP  c ER st 10' 5'  10' 10' 10'

## 2022-05-13 ENCOUNTER — APPOINTMENT (OUTPATIENT)
Dept: PHYSICAL THERAPY | Facility: CLINIC | Age: 47
End: 2022-05-13
Payer: COMMERCIAL

## 2022-05-16 ENCOUNTER — OFFICE VISIT (OUTPATIENT)
Dept: PHYSICAL THERAPY | Facility: CLINIC | Age: 47
End: 2022-05-16
Payer: COMMERCIAL

## 2022-05-16 DIAGNOSIS — M67.813 BICEPS TENDONOSIS OF RIGHT SHOULDER: ICD-10-CM

## 2022-05-16 DIAGNOSIS — Z98.890 STATUS POST SUBACROMIAL DECOMPRESSION: Primary | ICD-10-CM

## 2022-05-16 PROCEDURE — 97140 MANUAL THERAPY 1/> REGIONS: CPT | Performed by: PHYSICAL THERAPIST

## 2022-05-16 PROCEDURE — 97112 NEUROMUSCULAR REEDUCATION: CPT | Performed by: PHYSICAL THERAPIST

## 2022-05-16 PROCEDURE — 97110 THERAPEUTIC EXERCISES: CPT | Performed by: PHYSICAL THERAPIST

## 2022-05-16 NOTE — PROGRESS NOTES
PT Re-Evaluation    Today's date: 2022  Patient name: Wanda Fierro  : 1975  MRN: [id number]  Referring provider: Cloyce Kayser  Dx:   Encounter Diagnosis     ICD-10-CM    1  Status post subacromial decompression  Z98 890    2  Biceps tendonosis of right shoulder  M67 813            Subjective Evaluation     History of Present Illness    She is able to cut a bit better  She still has trouble c sleeping and is using a pillow underneath her R elbow and hand  She does have trouble putting on clothes and reaching overhead  Hx of injury:  Pt is a 50 yo female who presents with her  Jude Campuzano s/p subacromial decompression and LHBT tenodesis arthroscopically on 1/10/22  She had pain for 1 year shoulder pain from repetitive lifting and twisting at her work at a [employer]  She first went to MD in 2021  She denies electrical symptoms but the fingers do swell  Patient presents in sling and states MD mentioned not to actively raise it for 5 weeks  She has done this in the shower and she feels as if it gets stuck and has trouble bringing it back down  This sensation also happens when she sleeps and the arm falls in front of her or behind her in the sling  She has not yet transitioned to the bed  This has been tough for her because she is used to doing 10 things at one time but now is not very independent  Surgical note: "Long head of biceps tendon had a highly unstable anchor on the superior labrum where there was a fairly some substantial tear  Anterior labrum posterior labrum were intact  The long head of biceps tendon also had a partial tear along its course  We thus decided to perform a tenodesis due to the instability at the long head of biceps anchor  We had a stable tenodesis at the end of the procedure  We also demonstrated on the bursal side that there was partial-thickness tearing of the supraspinatus tendon upon which we performed a debridement with a shaver    acromioplasty for subacromial decompression          Neuro signs: none  Red flags: none  Occupation: - disability      Pain  At best pain ratin  At worst pain ratin  Location: ant shoulder and scapular pain  Quality: burning  Relieving factors: tylenol, ibuprofen, ice  Aggravating factors: lifting arm,     Social Support  Lives with 2 kids at home and her  who helps quite a bit      Patient Goals  Patient goals for therapy: Get back to what she was normally doing    STGs  1  Decrease pain by 20% in 2-4 weeks  -  met  2  Improve shoulder ROM to protocol standards in 2-4 weeks  - not met  3  Improve isometric strength s pain in 2-4 weeks  - met      LTGs  1  Decrease pain by 60% in 6-8 weeks  2  Improve overhead reaching tolerance to cabinet height in 10-12 weeks  -not met  3  Perform job activities without pain in 6-8 weeks  - not met      Objective Measurements:  Observation: arm in guarded position    Sensation: NT today    GH ext c supination neutral  GH ext c pronation 10 deg lacking elbow ext      DEIDRE: unable to tolerate [address] mobs  Palpation: TTP to infraspinatus and teres major hypersensitivity to touch    Cervical ROM L R Strength L R   Flex   WFL   Mid trap     Ext  painful in R UT   Low trap     SB        Rotation   [facility]      Shoulder A/PROM        Flex  / 99p/125p Flex     Abd  /  78p/105p Abd     ER  / 47p/44p 2 pillows ER  4   IR scratch  R glut/0 IR  4   ER scratch  T1/65      Wrist flex  65 flex  3+   ext  65 ext  3+p   Elbow ext  5 lacking      supination  Lacking 10 deg              Assessment:  Shaina Scott has demonstrated overall minimal improvement in ROM, strength, function, and a reduction in pain  Currently, she has made progress towards her goals, but continues to remain limited with Decreased shoulder strength/ROM, Hypersensitivity to touch, poor scapular control, elbow strength, postural deficits   Her progress has been slow compared to protocol standards due to her hypersensitivity and chronic pain although she does demonstrate good motivation  She may benefit from negative pressure MFD to help with her nerve irritability  At this time, skilled physical therapy is warranted to address the remaining impairments and aide in return to dressing, driving, work, cooking, and sleeping in bed s pain  We did trial repeated C retraction to see if this could affect her elbow/arm strength which it did not but she did feel a bit better  It does not seem likely for her to return to work at this phase due to significant ROM/strength limitation and hypersensitivity  However, we will still aim to restore best functional status possible  Limiting factors to therapy central sensitization and chronicity of sxs and she  demonstrates good motivation  Plan  Patient would benefit from:Skilled physical therapy  Planned therapy interventions: manual therapy, neuromuscular re-education, stretching, strengthening, therapeutic activities, therapeutic exercise, patient education, home exercise program, and activity modification      Frequency: 2x week  Duration in weeks:8  Treatment plan discussed with: patient       Goal: 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, sleeping in bed, shoulder not feeling out of place  Precautions: Libyan speaking, chronic pain, central sensitization, see protocol  Dx:  R biceps tenodesis and SAD- 1/10     Daily Treatment Diary   Daily Treatment Diary     Manuals 4/18 4/22 4/29 5/6 5/9 5/16   [address] PROM as eloina HOB elevated  10' 10' 10' 10 10' 10'   Scap PROM in L s/l  prn nv 5'   nv    C- PROM L SB and L rot                  Ther Ex         Sleeper wall st    3x :10 3x :10             S/l ER and abd   2x10 10x 10x    Wand ext 10x nv 10x 10x 10x 10x   Pulleys standing 3'/ sitting 3/3 3/3 3' standing 3' standing 3'/3   Wall slides 20x 20x  20x 20x 20x   C- SB AROM   10x :10 nv 10x :10    ER wand 20x :05 20x :05 10x :05 10x :05 10x :05 10x :05   Supine tennis ball stm rhomboid 2'  2' 2'     Elbow ext c arm in supination       10x :05   IR strap st 10x :05 10x :05 10x :05 10x :05 10x :05 10x :05   Elbow AROM flex nv 20x 20x nv  nv   Neuro Re-ed         Shoulder iso ER/IR      nv   scap retraction and depression in s/l   20x 10x      Finger walk 10x 10x  5x      AROM scaption B 10x 10x  L 16 10x 10x 10x 10x   Radial nerve st nv nv nv 10x 10x 10x :05   Ther Activity                                    Gait Training                           Modalities         CP  c ER st 10' 5'  10' 10' At home

## 2022-05-20 ENCOUNTER — APPOINTMENT (OUTPATIENT)
Dept: PHYSICAL THERAPY | Facility: CLINIC | Age: 47
End: 2022-05-20
Payer: COMMERCIAL

## 2022-05-23 ENCOUNTER — OFFICE VISIT (OUTPATIENT)
Dept: OBGYN CLINIC | Facility: CLINIC | Age: 47
End: 2022-05-23
Payer: OTHER MISCELLANEOUS

## 2022-05-23 ENCOUNTER — APPOINTMENT (OUTPATIENT)
Dept: PHYSICAL THERAPY | Facility: CLINIC | Age: 47
End: 2022-05-23
Payer: COMMERCIAL

## 2022-05-23 VITALS
SYSTOLIC BLOOD PRESSURE: 106 MMHG | HEART RATE: 69 BPM | WEIGHT: 201 LBS | DIASTOLIC BLOOD PRESSURE: 71 MMHG | BODY MASS INDEX: 37.95 KG/M2 | HEIGHT: 61 IN

## 2022-05-23 DIAGNOSIS — Z98.890 S/P ARTHROSCOPY OF RIGHT SHOULDER: Primary | ICD-10-CM

## 2022-05-23 PROCEDURE — 99213 OFFICE O/P EST LOW 20 MIN: CPT | Performed by: ORTHOPAEDIC SURGERY

## 2022-05-23 NOTE — PROGRESS NOTES
Assessment/Plan:  1  S/P arthroscopy of right shoulder         Scribe Attestation    I,:  Catherine Canchola am acting as a scribe while in the presence of the attending physician :       I,:  Swathi Little MD personally performed the services described in this documentation    as scribed in my presence :             Upon repeat evaluation and review of the physical therapy notes, Kory Kay continues to experience decreased right shoulder function 4 months and 13 days status post right shoulder arthroscopy biceps tenodesis, subacromial decompression, and limited debridement  I did communicate with her and her  in [facility] [place] today  Her physical therapy notes describe minimal improvement in her right shoulder range of motion and pain  I discussed with Kory Kay that at this time, I do not believe that she is ready to return to work full duty  I would like for her to continue with formal physical therapy at this time which I provided her with a new order for today  I also discussed with Kory Kay that she is to still be out of work at this time and I provided her with a note supporting this today  I will follow up with Kory Kay again in 6 weeks  At that time I will re-evaluate her right shoulder and her ability to return to work  Subjective:   Jaydon Howard is a 51 y o  female who presents to the office today for repeat clinical evaluation of her right shoulder 4 months and 13 days status post right shoulder arthroscopy biceps tenodesis, subacromial decompression, and limited debridement  Kory Kay states that since her last appointment, she continues to experience pain and decreased range of motion about her right shoulder  She also describes swelling about her right forearm when compared to her left  She denies any distal paresthesias about the right upper extremity  She describes continued difficulty with sleeping at night and states that she needs a pillow under arm    She also has difficulty with reaching behind her back  Merline Amanda states that she has been out of work at this time at her job as a   Review of Systems   Constitutional: Negative for chills and fever  HENT: Negative for ear pain and sore throat  Eyes: Negative for pain and visual disturbance  Respiratory: Negative for cough and shortness of breath  Cardiovascular: Negative for chest pain and palpitations  Gastrointestinal: Negative for abdominal pain and vomiting  Genitourinary: Negative for dysuria and hematuria  Musculoskeletal: Positive for arthralgias  Negative for back pain  Skin: Negative for color change and rash  Neurological: Negative for seizures and syncope  All other systems reviewed and are negative          Past Medical History:   Diagnosis Date    Gestational diabetes        Past Surgical History:   Procedure Laterality Date     SECTION       SECTION      IA SHLDR ARTHROSCOP,SURG,W/ROTAT CUFF REPR Right 1/10/2022    Procedure: DIAGNOSTIC ARTHROSCOPY WITH BICEPS TENODESIS, SUBACROMIAL DECOMPRESSION, AND LIMITED DEBRIDEMENT;  Surgeon: Prudence Ybarra MD;  Location: [facility];  Service: Orthopedics       Family History   Problem Relation Age of Onset    No Known Problems Mother     Lung cancer Father 62        smoker    No Known Problems Daughter     Stomach cancer Maternal Grandmother     No Known Problems Maternal Grandfather     Stomach cancer Paternal Grandmother     No Known Problems Paternal Grandfather     No Known Problems Daughter     No Known Problems Brother     No Known Problems Brother     No Known Problems Brother     No Known Problems Brother     No Known Problems Brother     No Known Problems Sister        Social History     Occupational History    Not on file   Tobacco Use    Smoking status: Never Smoker    Smokeless tobacco: Never Used   Vaping Use    Vaping Use: Never used   Substance and Sexual Activity    Alcohol use: No    Drug use: No    Sexual activity: Yes     Partners: Male     Birth control/protection: Injection         Current Outpatient Medications:     medroxyPROGESTERone (DEPO-PROVERA) 150 mg/mL injection, Inject 1 mL (150 mg total) into a muscle every 3 (three) months (Patient not taking: Reported on 5/23/2022), Disp: 1 mL, Rfl: 4    oxyCODONE (Roxicodone) 5 immediate release tablet, Take 1 tablet (5 mg total) by mouth every 4 (four) hours as needed for moderate pain for up to 15 doses Max Daily Amount: 30 mg (Patient not taking: No sig reported), Disp: 15 tablet, Rfl: 0    Current Facility-Administered Medications:     medroxyPROGESTERone (DEPO-PROVERA) IM injection 150 mg, 150 mg, Intramuscular, Q3 Months, PETER Iverson, 150 mg at 04/22/22 1151    No Known Allergies    Objective:  Vitals:    05/23/22 0933   BP: 106/71   Pulse: 69       Right Shoulder Exam     Range of Motion   External rotation: 40   Forward flexion: 90   Internal rotation 90 degrees: 20     Muscle Strength   Right shoulder normal muscle strength: Abduction: 4-/5  Internal rotation: 5/5   External rotation: 5/5   Biceps: 4/5     Other   Erythema: absent  Scars: present  Sensation: normal            Physical Exam  HENT:      Head: Normocephalic and atraumatic  Eyes:      General:         Right eye: No discharge  Left eye: No discharge  Extraocular Movements: Extraocular movements intact  Conjunctiva/sclera: Conjunctivae normal    Cardiovascular:      Rate and Rhythm: Normal rate  Pulmonary:      Effort: Pulmonary effort is normal  No respiratory distress  Musculoskeletal:         General: Tenderness present  Cervical back: Normal range of motion and neck supple  Skin:     General: Skin is warm and dry  Neurological:      Mental Status: She is alert and oriented to person, place, and time     Psychiatric:         Mood and Affect: Mood normal          Behavior: Behavior normal

## 2022-05-23 NOTE — LETTER
May 23, 2022     Patient: Kelli Jeffers  YOB: 1975  Date of Visit: 5/23/2022      To Whom it May Concern:    Lluvia Antonio is under my professional care  Keri Orosco was seen in my office on 5/23/2022  Keri Orosco is not to return to work until cleared by a physician  If you have any questions or concerns, please don't hesitate to call           Sincerely,          Krishna Ulloa MD        CC: No Recipients

## 2022-05-27 ENCOUNTER — OFFICE VISIT (OUTPATIENT)
Dept: PHYSICAL THERAPY | Facility: CLINIC | Age: 47
End: 2022-05-27
Payer: COMMERCIAL

## 2022-05-27 ENCOUNTER — TELEPHONE (OUTPATIENT)
Dept: OBGYN CLINIC | Facility: HOSPITAL | Age: 47
End: 2022-05-27

## 2022-05-27 DIAGNOSIS — Z98.890 STATUS POST SUBACROMIAL DECOMPRESSION: Primary | ICD-10-CM

## 2022-05-27 DIAGNOSIS — M67.813 BICEPS TENDONOSIS OF RIGHT SHOULDER: ICD-10-CM

## 2022-05-27 PROCEDURE — 97112 NEUROMUSCULAR REEDUCATION: CPT | Performed by: PHYSICAL THERAPIST

## 2022-05-27 PROCEDURE — 97110 THERAPEUTIC EXERCISES: CPT | Performed by: PHYSICAL THERAPIST

## 2022-05-27 PROCEDURE — 97140 MANUAL THERAPY 1/> REGIONS: CPT | Performed by: PHYSICAL THERAPIST

## 2022-05-27 NOTE — TELEPHONE ENCOUNTER
Earnstine Clause from [address] is calling requesting OVN and work letter be faxed from [phone]    Faxed

## 2022-05-27 NOTE — PROGRESS NOTES
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, sleeping in bed, shoulder not feeling out of place  Precautions: Bengali speaking, chronic pain, central sensitization, see protocol  Dx:  R biceps tenodesis and SAD- 1/10     Daily Treatment Diary     Manuals    [address] PROM as eloina HOB elevated  10' 10' 10' 10 10' 10'   Scap PROM in L s/l  prn 4' 5'   nv    C- PROM L SB and L rot         - pressure MFD to rhomboids 5'        Ther Ex         Sleeper wall st    3x :10 3x :10             S/l ER and abd   2x10 10x 10x    Wand ext 10x nv 10x 10x 10x 10x   Pulleys standing 3/3 sitting today 3/3 3/3 3' standing 3' standing 3'/3   Wall slides 20x 20x  20x 20x 20x   C- SB AROM   10x :10 nv 10x :10    ER wand  20x :05 10x :05 10x :05 10x :05 10x :05   Supine tennis ball stm rhomboid   2' 2'     Elbow ext c arm in supination       10x :05   IR strap st 10x :05 10x :05 10x :05 10x :05 10x :05 10x :05   Elbow AROM flex c wt nv 20x 20x nv  nv   Neuro Re-ed         Shoulder iso ER/IR      nv   scap retraction and depression in s/l   20x 10x      Finger walk 10x 10x  5x      AROM scaption B 10x 10x  L 16 10x 10x 10x 10x   Radial nerve st  nv nv 10x 10x 10x :05   Ther Activity                                    Gait Training                           Modalities         CP  c ER st 8' 5'  10' 10' At home

## 2022-06-03 ENCOUNTER — OFFICE VISIT (OUTPATIENT)
Dept: PHYSICAL THERAPY | Facility: CLINIC | Age: 47
End: 2022-06-03
Payer: COMMERCIAL

## 2022-06-03 DIAGNOSIS — M67.813 BICEPS TENDONOSIS OF RIGHT SHOULDER: ICD-10-CM

## 2022-06-03 DIAGNOSIS — Z98.890 STATUS POST SUBACROMIAL DECOMPRESSION: Primary | ICD-10-CM

## 2022-06-03 PROCEDURE — 97140 MANUAL THERAPY 1/> REGIONS: CPT | Performed by: PHYSICAL THERAPIST

## 2022-06-03 PROCEDURE — 97530 THERAPEUTIC ACTIVITIES: CPT | Performed by: PHYSICAL THERAPIST

## 2022-06-03 PROCEDURE — 97110 THERAPEUTIC EXERCISES: CPT | Performed by: PHYSICAL THERAPIST

## 2022-06-03 PROCEDURE — 97112 NEUROMUSCULAR REEDUCATION: CPT | Performed by: PHYSICAL THERAPIST

## 2022-06-03 NOTE — PROGRESS NOTES
Daily Note     Today's date: 6/3/2022  Patient name: Mario Morales  : 1975  MRN: [id number]  Referring provider: Enrique Manzanares  Dx:   Encounter Diagnosis     ICD-10-CM    1  Status post subacromial decompression  Z98 890    2  Biceps tendonosis of right shoulder  M67 813                   Subjective: She states her forearm was getting harder over the weekend  Objective: See treatment diary below      Assessment: Tolerated treatment well  Focused on more strengthening this visit  Will progress as tolerable  Patient would benefit from continued PT       Plan: Continue per plan of care        Goal: 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, sleeping in bed, shoulder not feeling out of place  Precautions: Puerto Rican speaking, chronic pain, central sensitization, see protocol  Dx:  R biceps tenodesis and SAD- 1/10     Daily Treatment Diary     Manuals 5/27 6/3 4/29 5/6 5/9 5/16   [address] [place] PROM as eloina HOB elevated  10' 7' 10' 10 10' 10'   Scap PROM in L s/l  prn 4' 4'   nv    MRE ER and IR to [address]  10x :05       - pressure MFD to rhomboids 5' 5'       Ther Ex         Sleeper wall st    3x :10 3x :10             S/l ER and abd   2x10 10x 10x    Wand ext 10x nv 10x 10x 10x 10x   Pulleys standing 3/3 sitting today 3/3 3/3 3' standing 3' standing 3'/3            C- SB AROM   10x :10 nv 10x :10    ER wand  20x :05 10x :05 10x :05 10x :05 10x :05   Supine tennis ball stm rhomboid   2' 2'     Elbow ext c arm in supination       10x :05   IR strap st 10x :05 10x :05 10x :05 10x :05 10x :05 10x :05   Elbow AROM flex c wt nv  20x nv  nv   Neuro Re-ed         Shoulder iso ER/IR  10x :05 10x :05   nv   scap retraction and depression in s/l   20x 10x      Finger walk and abd 10x 10x  10x      AROM scaption B 10x 10x  10x 10x 10x 10x   Radial nerve st  nv nv 10x 10x 10x :05   Ther Activity                                    Gait Training                           Modalities         CP  c ER st 10' 5'  10' 10' At home

## 2022-06-06 ENCOUNTER — APPOINTMENT (OUTPATIENT)
Dept: PHYSICAL THERAPY | Facility: CLINIC | Age: 47
End: 2022-06-06
Payer: COMMERCIAL

## 2022-06-10 ENCOUNTER — APPOINTMENT (OUTPATIENT)
Dept: PHYSICAL THERAPY | Facility: CLINIC | Age: 47
End: 2022-06-10
Payer: COMMERCIAL

## 2022-06-13 ENCOUNTER — OFFICE VISIT (OUTPATIENT)
Dept: PHYSICAL THERAPY | Facility: CLINIC | Age: 47
End: 2022-06-13
Payer: COMMERCIAL

## 2022-06-13 DIAGNOSIS — M67.813 BICEPS TENDONOSIS OF RIGHT SHOULDER: ICD-10-CM

## 2022-06-13 DIAGNOSIS — Z98.890 STATUS POST SUBACROMIAL DECOMPRESSION: Primary | ICD-10-CM

## 2022-06-13 PROCEDURE — 97112 NEUROMUSCULAR REEDUCATION: CPT

## 2022-06-13 PROCEDURE — 97110 THERAPEUTIC EXERCISES: CPT

## 2022-06-13 PROCEDURE — 97140 MANUAL THERAPY 1/> REGIONS: CPT

## 2022-06-13 NOTE — PROGRESS NOTES
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, sleeping in bed, shoulder not feeling out of place  Precautions: Burmese speaking, chronic pain, central sensitization, see protocol  Dx:  R biceps tenodesis and SAD- 1/10     Daily Treatment Diary     Manuals 5/27 6/3 6/13      [address] PROM as eloina HOB elevated  10' 7' 8      Scap PROM in L s/l  prn 4' 4' 4      MRE ER and IR to [address]  10x :05 5" x 10       - pressure MFD to rhomboids 5' 5'       Ther Ex         Sleeper wall st                  S/l ER and abd         Wand ext 10x nv 10" x 10       Pulleys  3/3 sitting today 3/3 3/3               C- SB AROM         ER wand  20x :05 5" x 20       Supine tennis ball stm rhomboid         Elbow ext c arm in supination          IR strap st 10x :05 10x :05 10" x 10      Elbow AROM flex c wt nv        Neuro Re-ed         Shoulder iso ER/IR  10x :05 5" x 10       scap retraction and depression in s/l   20x 5" x 10       Finger walk and abd 10x 10x  10" x 10       AROM scaption B 10x 10x  10x      Radial nerve st  nv       Ther Activity                                    Gait Training                           Modalities         CP  c ER st 10' 5' 5
Walk in

## 2022-06-17 ENCOUNTER — APPOINTMENT (OUTPATIENT)
Dept: PHYSICAL THERAPY | Facility: CLINIC | Age: 47
End: 2022-06-17
Payer: COMMERCIAL

## 2022-06-20 ENCOUNTER — OFFICE VISIT (OUTPATIENT)
Dept: PHYSICAL THERAPY | Facility: CLINIC | Age: 47
End: 2022-06-20
Payer: COMMERCIAL

## 2022-06-20 DIAGNOSIS — M67.813 BICEPS TENDONOSIS OF RIGHT SHOULDER: ICD-10-CM

## 2022-06-20 DIAGNOSIS — Z98.890 STATUS POST SUBACROMIAL DECOMPRESSION: Primary | ICD-10-CM

## 2022-06-20 PROCEDURE — 97110 THERAPEUTIC EXERCISES: CPT | Performed by: PHYSICAL THERAPIST

## 2022-06-20 PROCEDURE — 97140 MANUAL THERAPY 1/> REGIONS: CPT | Performed by: PHYSICAL THERAPIST

## 2022-06-20 PROCEDURE — 97112 NEUROMUSCULAR REEDUCATION: CPT | Performed by: PHYSICAL THERAPIST

## 2022-06-20 NOTE — PROGRESS NOTES
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, sleeping in bed, shoulder not feeling out of place  Precautions: German speaking, chronic pain, central sensitization, see protocol  Dx:  R biceps tenodesis and SAD- 1/10     Daily Treatment Diary     Manuals 5/27 6/3 6/13 6/20     [address] [place] PROM as eloina HOB elevated  10' 7' 8 KK     Scap PROM in L s/l  prn 4' 4' 4 KK     MRE ER and IR to  [address] [place]  10x :05 5" x 10  5" x 10      - pressure MFD to rhomboids 5' 5'       Ther Ex         Sleeper wall st                  S/l ER and abd         Wand ext 10x nv 10" x 10  10" x 10      Pulleys  3/3 sitting today 3/3 3/3 3/3              C- SB AROM         ER wand  20x :05 5" x 20  5" x 20      Supine tennis ball stm rhomboid         Elbow ext c arm in supination          IR strap st 10x :05 10x :05 10" x 10 10" x 10     Elbow AROM flex c wt nv        Neuro Re-ed         Shoulder iso ER/IR  10x :05 5" x 10  5" x 10      scap retraction and depression in s/l   20x 5" x 10  5" x 10      Finger walk and abd 10x 10x  10" x 10  10" x 10      AROM scaption B 10x 10x  10x 10x     Radial nerve st  nv       Ther Activity                                    Gait Training                           Modalities         CP  c ER st 10' 5' 5 10'

## 2022-06-24 ENCOUNTER — OFFICE VISIT (OUTPATIENT)
Dept: PHYSICAL THERAPY | Facility: CLINIC | Age: 47
End: 2022-06-24
Payer: COMMERCIAL

## 2022-06-24 DIAGNOSIS — M67.813 BICEPS TENDONOSIS OF RIGHT SHOULDER: ICD-10-CM

## 2022-06-24 DIAGNOSIS — Z98.890 STATUS POST SUBACROMIAL DECOMPRESSION: Primary | ICD-10-CM

## 2022-06-24 PROCEDURE — 97110 THERAPEUTIC EXERCISES: CPT | Performed by: PHYSICAL MEDICINE & REHABILITATION

## 2022-06-24 PROCEDURE — 97140 MANUAL THERAPY 1/> REGIONS: CPT | Performed by: PHYSICAL MEDICINE & REHABILITATION

## 2022-06-24 PROCEDURE — 97112 NEUROMUSCULAR REEDUCATION: CPT | Performed by: PHYSICAL MEDICINE & REHABILITATION

## 2022-06-24 NOTE — PROGRESS NOTES
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, sleeping in bed, shoulder not feeling out of place  Precautions: Beninese speaking, chronic pain, central sensitization, see protocol  Dx:  R biceps tenodesis and SAD- 1/10     Daily Treatment Diary     Manuals 5/27 6/3 6/13 6/20 6/24    [address] [place] PROM as eloina HOB elevated  10' 7' 8 KK LH    Scap PROM in L s/l  prn 4' 4' 4 KK LH    MRE ER and IR to [address] [place]  10x :05 5" x 10  5" x 10      - pressure MFD to rhomboids 5' 5'       Ther Ex         Sleeper wall st                  S/l ER and abd         Wand ext 10x nv 10" x 10  10" x 10  10x10"    Pulleys  3/3 sitting today 3/3 3/3 3/3 3'/3'             C- SB AROM         ER wand  20x :05 5" x 20  5" x 20  10x10"    Supine tennis ball stm rhomboid         Elbow ext c arm in supination          IR strap st 10x :05 10x :05 10" x 10 10" x 10 Wand 10x10"    Elbow AROM flex c wt nv        Neuro Re-ed         Shoulder iso ER/IR  10x :05 5" x 10  5" x 10      scap retraction and depression in s/l   20x 5" x 10  5" x 10  10x ea    Finger walk and abd 10x 10x  10" x 10  10" x 10  10x10" ea    AROM scaption B 10x 10x  10x 10x 2x5    Radial nerve st  nv       Ther Activity                                    Gait Training                           Modalities         CP  c ER st 10' 5' 5 10' 10' post

## 2022-06-27 ENCOUNTER — EVALUATION (OUTPATIENT)
Dept: PHYSICAL THERAPY | Facility: CLINIC | Age: 47
End: 2022-06-27
Payer: COMMERCIAL

## 2022-06-27 DIAGNOSIS — Z98.890 STATUS POST SUBACROMIAL DECOMPRESSION: Primary | ICD-10-CM

## 2022-06-27 PROCEDURE — 97112 NEUROMUSCULAR REEDUCATION: CPT | Performed by: PHYSICAL THERAPIST

## 2022-06-27 PROCEDURE — 97110 THERAPEUTIC EXERCISES: CPT | Performed by: PHYSICAL THERAPIST

## 2022-06-27 PROCEDURE — 97530 THERAPEUTIC ACTIVITIES: CPT | Performed by: PHYSICAL THERAPIST

## 2022-06-27 PROCEDURE — 97140 MANUAL THERAPY 1/> REGIONS: CPT | Performed by: PHYSICAL THERAPIST

## 2022-06-27 NOTE — PROGRESS NOTES
PT Re-Evaluation    Today's date: 2022  Patient name: Herb Isaac  : 1975  MRN: [id number]  Referring provider: Tera Jj  Dx:   Encounter Diagnosis     ICD-10-CM    1  Status post subacromial decompression  A75 [id number]            Subjective Evaluation     History of Present Illness    She states she does have more time at home and is able to help her family more  She states she is using the pillow when sleeping to support her elbow  She will be having an appt tomorrow regarding her work insurance  She trialed swimming yesterday but was unable to do so  Hx of injury:  Pt is a 50 yo female who presents with her  Milana De La Cruz s/p subacromial decompression and LHBT tenodesis arthroscopically on 1/10/22  She had pain for 1 year shoulder pain from repetitive lifting and twisting at her work at a [employer]  She first went to MD in 2021  She denies electrical symptoms but the fingers do swell  Patient presents in sling and states MD mentioned not to actively raise it for 5 weeks  She has done this in the shower and she feels as if it gets stuck and has trouble bringing it back down  This sensation also happens when she sleeps and the arm falls in front of her or behind her in the sling  She has not yet transitioned to the bed  This has been tough for her because she is used to doing 10 things at one time but now is not very independent  Surgical note: "Long head of biceps tendon had a highly unstable anchor on the superior labrum where there was a fairly some substantial tear  Anterior labrum posterior labrum were intact  The long head of biceps tendon also had a partial tear along its course  We thus decided to perform a tenodesis due to the instability at the long head of biceps anchor  We had a stable tenodesis at the end of the procedure    We also demonstrated on the bursal side that there was partial-thickness tearing of the supraspinatus tendon upon which we performed a debridement with a shaver    acromioplasty for subacromial decompression          Neuro signs: none  Red flags: none  Occupation: - disability      Pain  At best pain ratin  At worst pain ratin  Location: ant shoulder and scapular pain  Quality: burning  Relieving factors: tylenol, ibuprofen, ice  Aggravating factors: lifting arm,     Social Support  Lives with 2 kids at home and her  who helps quite a bit      Patient Goals  Patient goals for therapy: Get back to what she was normally doing    STGs  1  Decrease pain by 20% in 2-4 weeks  -  met  2  Improve shoulder ROM to protocol standards in 2-4 weeks  - not met  3  Improve isometric strength s pain in 2-4 weeks  - met      LTGs  1  Decrease pain by 60% in 6-8 weeks  2  Improve overhead reaching tolerance to cabinet height in 10-12 weeks  -not met  3  Perform job activities without pain in 6-8 weeks  - not met      Objective Measurements:  Observation: arm in guarded position    Sensation: NT today    GH ext c supination neutral  GH ext c pronation 10 deg lacking elbow ext      DEIDRE: unable to tolerate [address] mobs  Palpation: TTP to infraspinatus and teres major hypersensitivity to touch    Cervical ROM L R Strength L R   Flex   WFL   Mid trap     Ext  painful in R UT   Low trap     SB        Rotation   [facility]      Shoulder A/PROM        Flex  / 110p/118p Flex     Abd  /  88p/112p Abd     ER  / 42p/47p 2 pillows ER  4   IR scratch  R glut/13 IR  4   ER scratch  C7/62p      Wrist flex  65 flex  3+   ext  65 ext  3+p   Elbow ext  5 lacking      supination  Lacking 10 deg              Assessment:  Arlette Jackson has demonstrated overall minimal improvement in ROM, strength, function, and a reduction in pain  Currently, she has made progress towards her goals, but continues to remain limited with Decreased shoulder strength/ROM, Hypersensitivity to touch, poor scapular control, elbow strength, postural deficits   She is still making improvements but her ROM is slowly improving  At this time, skilled physical therapy is warranted to address the remaining impairments and aide in return to dressing, driving, work, cooking, and sleeping in bed s pain  She has been trying to do more at home and dealing with the pain  It does not seem likely for her to return to work at this phase due to significant ROM/strength limitation and hypersensitivity  However, we will still aim to restore best functional status possible  Limiting factors to therapy central sensitization and chronicity of sxs and she  demonstrates good motivation  FOTO: 39 still limited and will work on more cervical extension and retraction to help ROM      Plan  Patient would benefit from:Skilled physical therapy  Planned therapy interventions: manual therapy, neuromuscular re-education, stretching, strengthening, therapeutic activities, therapeutic exercise, patient education, home exercise program, and activity modification      Frequency: 2x week  Duration in weeks:8  Treatment plan discussed with: patient       Goal: 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, sleeping in bed, shoulder not feeling out of place  Precautions: Italian speaking, chronic pain, central sensitization, see protocol  Dx:  R biceps tenodesis and SAD- 1/10     Daily Treatment Diary     Manuals 5/27 6/3 6/13 6/20 6/24 6/27   [facility] PROM as eloina HOB elevated  10' 7' 8 KK LH 10'   Scap PROM in L s/l  prn 4' 4' 4 KK LH    MRE ER and IR to [facility]  10x :05 5" x 10  5" x 10   nv   - pressure MFD to rhomboids 5' 5'       Ther Ex         Sleeper wall st                  S/l ER and abd         Wand ext 10x nv 10" x 10  10" x 10  10x10"    Pulleys  3/3 sitting today 3/3 3/3 3/3 3'/3' 3/3            C- SB AROM         ER wall st      nv            Elbow ext c arm in supination          IR strap st 10x :05 10x :05 10" x 10 10" x 10 Wand 10x10"    Elbow AROM flex c wt nv     nv   Neuro Re-ed     6/24 Shoulder iso ER/IR  10x :05 5" x 10  5" x 10      scap retraction and depression in s/l   20x 5" x 10  5" x 10  10x ea    Finger walk and abd 10x 10x  10" x 10  10" x 10  10x10" ea    AROM scaption B 10x 10x  10x 10x 2x5 10x   Radial nerve st  nv       Ther Activity                                    Gait Training                           Modalities     6/24    CP  c ER st 10' 5' 5 10' 10' post 5'

## 2022-07-01 ENCOUNTER — OFFICE VISIT (OUTPATIENT)
Dept: PHYSICAL THERAPY | Facility: CLINIC | Age: 47
End: 2022-07-01
Payer: COMMERCIAL

## 2022-07-01 DIAGNOSIS — M67.813 BICEPS TENDONOSIS OF RIGHT SHOULDER: ICD-10-CM

## 2022-07-01 DIAGNOSIS — Z98.890 S/P ARTHROSCOPY OF RIGHT SHOULDER: ICD-10-CM

## 2022-07-01 DIAGNOSIS — Z98.890 STATUS POST SUBACROMIAL DECOMPRESSION: Primary | ICD-10-CM

## 2022-07-01 PROCEDURE — 97110 THERAPEUTIC EXERCISES: CPT | Performed by: PHYSICAL THERAPIST

## 2022-07-01 PROCEDURE — 97112 NEUROMUSCULAR REEDUCATION: CPT | Performed by: PHYSICAL THERAPIST

## 2022-07-01 PROCEDURE — 97140 MANUAL THERAPY 1/> REGIONS: CPT | Performed by: PHYSICAL THERAPIST

## 2022-07-01 NOTE — PROGRESS NOTES
Daily Note     Today's date: 2022  Patient name: Amanda Hassan  : 1975  MRN: [id number]  Referring provider: Candace Carter  Dx:   Encounter Diagnosis     ICD-10-CM    1  Status post subacromial decompression  Z98 890    2  Biceps tendonosis of right shoulder  M67 813                   Subjective: She states she is continuing to have overall discomfort in shoulder  Objective: See treatment diary below  Chin retraction performed c abduction c improved mobility to about 100  Assessment: Tolerated treatment well  Will continue c C-retraction and extension mobility  Patient would benefit from continued PT      Plan: Continue per plan of care        Goal: 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, sleeping in bed, shoulder not feeling out of place  Precautions: [place] speaking, chronic pain, central sensitization, see protocol  Dx:  R biceps tenodesis and SAD- 1/10     Daily Treatment Diary     Manuals 7/1 6/3 6/13 6/20 6/24 6/27   [address] PROM as eloina HOB elevated  10' 7' 8 KK LH 10'   Chin retraction manual OP 10x :03        MRE ER and IR to [address]  10x :05 5" x 10  5" x 10   nv   Chin retraction and ext in supine 5x        - pressure MFD to rhomboids  5'       Ther Ex         Sleeper wall st                  S/l ER and abd         Wand ext 10x nv 10" x 10  10" x 10  10x10"    Pulleys  3/3 sitting today 33 3/3 3/3 3'/3' 33            C- SB AROM         ER wall st      nv            Elbow ext c arm in supination          IR strap st 10x :05 10x :05 10" x 10 10" x 10 Wand 10x10"    Elbow AROM flex c wt nv     nv   Neuro Re-ed         Chin retraction c op 2x10        Chin retraction and ext c op 10x        scap retraction and depression in s/l   20x 5" x 10  5" x 10  10x ea    Finger walk and abd 10x 10x  10" x 10  10" x 10  10x10" ea    AROM scaption B 10x 10x  10x 10x 2x5 10x   Radial nerve st  nv       Ther Activity         Wall slides c circles 20x- perform c ball nv                          Gait Training                           Modalities     6/24    CP  c ER st 5' 5' 5 10' 10' post 5'

## 2022-07-08 ENCOUNTER — OFFICE VISIT (OUTPATIENT)
Dept: PHYSICAL THERAPY | Facility: CLINIC | Age: 47
End: 2022-07-08
Payer: COMMERCIAL

## 2022-07-08 DIAGNOSIS — Z98.890 STATUS POST SUBACROMIAL DECOMPRESSION: Primary | ICD-10-CM

## 2022-07-08 DIAGNOSIS — Z98.890 S/P ARTHROSCOPY OF RIGHT SHOULDER: ICD-10-CM

## 2022-07-08 DIAGNOSIS — M67.813 BICEPS TENDONOSIS OF RIGHT SHOULDER: ICD-10-CM

## 2022-07-08 PROCEDURE — 97112 NEUROMUSCULAR REEDUCATION: CPT | Performed by: PHYSICAL THERAPIST

## 2022-07-08 PROCEDURE — 97110 THERAPEUTIC EXERCISES: CPT | Performed by: PHYSICAL THERAPIST

## 2022-07-08 PROCEDURE — 97140 MANUAL THERAPY 1/> REGIONS: CPT | Performed by: PHYSICAL THERAPIST

## 2022-07-08 NOTE — PROGRESS NOTES
Daily Note     Today's date: 2022  Patient name: Med Lunsford  : 1975  MRN: [id number]  Referring provider: Bautista Nicolas  Dx:   Encounter Diagnosis     ICD-10-CM    1  Status post subacromial decompression  Z98 890    2  Biceps tendonosis of right shoulder  M67 [id number]    3  S/P arthroscopy of right shoulder  Z98 890                   Subjective: She states she was sore in her neck for 2 days after last session but is feeling more loose today  Objective: See treatment diary below  Pre treatment: flex 112  abd 85    10cm below elbow L 24 5 R 26 5  Wrist swelling   R 17 2  L 16 8      Assessment: Tolerated treatment well  She was given compression sleeve to help c inflammation and can consider vasopneumatic pump nv  Patient would benefit from continued PT      Plan: Continue per plan of care        Goal: 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, sleeping in bed, shoulder not feeling out of place  Precautions: [place] speaking, chronic pain, central sensitization, see protocol  Dx:  R biceps tenodesis and SAD- 1/10     Daily Treatment Diary     Manuals    [address] PROM as eloina HOB elevated  10' 7' 8 KK LH 10'   Chin retraction manual OP 10x :03        MRE ER and IR to  [address]   5" x 10  5" x 10   nv   Chin retraction and ext in supine 5x 6x       - pressure MFD to rhomboids         Ther Ex         Sleeper wall st                  S/l ER and abd         Wand ext 10x 10x 10" x 10  10" x 10  10x10"    Pulleys  3/3 sitting today nv 3/3 3/3 3'/3' 3/3            C- SB AROM         ER wall st      nv            Elbow ext c arm in supination          IR strap st 10x :05 10x :05 10" x 10 10" x 10 Wand 10x10"    Elbow AROM flex c wt nv nv    nv   Neuro Re-ed         Chin retraction c op 2x10        Chin retraction and ext c op 10x c rotation 10x                Finger walk and abd 10x 10x  10" x 10  10" x 10  10x10" ea    AROM scaption B 10x 10x  10x 10x 2x5 10x   Radial nerve st         Ther Activity         Wall slides c circles 20x- perform c ball nv nv                         Gait Training                           Modalities     6/24    CP  c ER st 5' 5' 5 10' 10' post 5'

## 2022-07-11 ENCOUNTER — OFFICE VISIT (OUTPATIENT)
Dept: OBGYN CLINIC | Facility: CLINIC | Age: 47
End: 2022-07-11
Payer: OTHER MISCELLANEOUS

## 2022-07-11 VITALS
WEIGHT: 204.4 LBS | HEIGHT: 61 IN | SYSTOLIC BLOOD PRESSURE: 117 MMHG | DIASTOLIC BLOOD PRESSURE: 77 MMHG | BODY MASS INDEX: 38.59 KG/M2 | HEART RATE: 65 BPM

## 2022-07-11 DIAGNOSIS — Z98.890 S/P ARTHROSCOPY OF RIGHT SHOULDER: Primary | ICD-10-CM

## 2022-07-11 PROCEDURE — 99214 OFFICE O/P EST MOD 30 MIN: CPT | Performed by: ORTHOPAEDIC SURGERY

## 2022-07-11 NOTE — LETTER
July 11, 2022     Patient: Quan Gave  YOB: 1975  Date of Visit: 7/11/2022      To Whom it May Concern:    Glenis Mariee is under my professional care  Lorenso Canavan was seen in my office on 7/11/2022  Lorenso Canavan may return to work on [employer] duty with no lifting greater than 10 lbs with her RUE, no repetitive reaching, and no work over chest level  If you have any questions or concerns, please don't hesitate to call           Sincerely,          Bryant Jernigan MD

## 2022-07-11 NOTE — PROGRESS NOTES
Assessment/Plan:  1  S/P arthroscopy of right shoulder  MRI shoulder right wo contrast       Scribe Attestation    I,:  Louise Johnson MA am acting as a scribe while in the presence of the attending physician :       I,:  Ish Logan MD personally performed the services described in this documentation    as scribed in my presence :             Unfortunately, Blade Arizmendi continues to demonstrate decreased right shoulder function 6 months status post right shoulder arthroscopy biceps tenodesis, subacromial decompression, and limited debridement  Her formal therapy notes were reviewed which demonstrate minimal improvement  We did discuss ordering a MRI of her right shoulder to evaluate for a rotator cuff tear due to her lack of progress and continued pain  Patient was agreeable to this and a order was placed for this today  Patient may discontinue formal therapy however, was advised to continue with physician directed HEP  She may return to work light duty with no lifting greater than 10 lbs, no work over chest level, and no repetitive reaching and a note was provided for this today  She will follow up once the MRI is complete to discuss the results  Subjective:   Miguelito Qureshi is a 51 y o  female who presents to the office today for follow up evaluation of her right shoulder  Patient is now 6 months status post right shoulder arthroscopy biceps tenodesis, subacromial decompression, and limited debridement  This is a workman's compensation case  Patient is Belgian-speaking  I did speak Belgian to her today  Patient notes continued pain and difficulty  Patient noes difficulty and pain with ADLs and getting dressed  She has been compliant with formal therapy  She notes pain and swelling about the hand and wrist  She has been out of work  Review of Systems   Constitutional: Negative for chills and fever  HENT: Negative for drooling and sneezing  Eyes: Negative for redness     Respiratory: Negative for cough and wheezing  Gastrointestinal: Negative for nausea and vomiting  Musculoskeletal: Negative for arthralgias, joint swelling and myalgias  Neurological: Negative for weakness and numbness  Psychiatric/Behavioral: Negative for behavioral problems  The patient is not nervous/anxious            Past Medical History:   Diagnosis Date    Gestational diabetes        Past Surgical History:   Procedure Laterality Date     SECTION       SECTION      [place] SHLDR ARTHROSCOP,SURG,W/ROTAT CUFF REPR Right 1/10/2022    Procedure: DIAGNOSTIC ARTHROSCOPY WITH BICEPS TENODESIS, SUBACROMIAL DECOMPRESSION, AND LIMITED DEBRIDEMENT;  Surgeon: Lizett Saha MD;  Location: [facility];  Service: Orthopedics       Family History   Problem Relation Age of Onset    No Known Problems Mother     Lung cancer Father 62        smoker    No Known Problems Daughter     Stomach cancer Maternal Grandmother     No Known Problems Maternal Grandfather     Stomach cancer Paternal Grandmother     No Known Problems Paternal Grandfather     No Known Problems Daughter     No Known Problems Brother     No Known Problems Brother     No Known Problems Brother     No Known Problems Brother     No Known Problems Brother     No Known Problems Sister        Social History     Occupational History    Not on file   Tobacco Use    Smoking status: Never Smoker    Smokeless tobacco: Never Used   Vaping Use    Vaping Use: Never used   Substance and Sexual Activity    Alcohol use: No    Drug use: No    Sexual activity: Yes     Partners: Male     Birth control/protection: Injection         Current Outpatient Medications:     medroxyPROGESTERone (DEPO-PROVERA) 150 mg/mL injection, Inject 1 mL (150 mg total) into a muscle every 3 (three) months (Patient not taking: No sig reported), Disp: 1 mL, Rfl: 4    oxyCODONE (Roxicodone) 5 immediate release tablet, Take 1 tablet (5 mg total) by mouth every 4 (four) hours as needed for moderate pain for up to 15 doses Max Daily Amount: 30 mg (Patient not taking: No sig reported), Disp: 15 tablet, Rfl: 0    Current Facility-Administered Medications:     medroxyPROGESTERone (DEPO-PROVERA) IM injection 150 mg, 150 mg, Intramuscular, Q3 Months, PETER Iverson, 150 mg at 04/22/22 1151    No Known Allergies    Objective:  Vitals:    07/11/22 1054   BP: 117/77   Pulse: 65       Right Shoulder Exam     Range of Motion   Active abduction: 100   External rotation: 60   Forward flexion: 120   Internal rotation 90 degrees: 40     Muscle Strength   Abduction: 4/5   Internal rotation: 5/5   External rotation: 5/5     Other   Erythema: absent  Sensation: normal  Pulse: present    Comments:  Surgical incisions well healed             Physical Exam  Constitutional:       Appearance: She is well-developed  HENT:      Head: Normocephalic and atraumatic  Eyes:      General:         Right eye: No discharge  Left eye: No discharge  Conjunctiva/sclera: Conjunctivae normal    Cardiovascular:      Rate and Rhythm: Normal rate  Pulmonary:      Effort: Pulmonary effort is normal  No respiratory distress  Musculoskeletal:      Cervical back: Normal range of motion and neck supple  Comments: As noted in HPI   Skin:     General: Skin is warm and dry  Neurological:      Mental Status: She is alert and oriented to person, place, and time  Psychiatric:         Behavior: Behavior normal          Thought Content:  Thought content normal          Judgment: Judgment normal

## 2022-07-15 ENCOUNTER — CLINICAL SUPPORT (OUTPATIENT)
Dept: OBGYN CLINIC | Facility: CLINIC | Age: 47
End: 2022-07-15

## 2022-07-15 ENCOUNTER — APPOINTMENT (OUTPATIENT)
Dept: PHYSICAL THERAPY | Facility: CLINIC | Age: 47
End: 2022-07-15
Payer: COMMERCIAL

## 2022-07-15 DIAGNOSIS — Z30.42 ENCOUNTER FOR DEPO-PROVERA CONTRACEPTION: ICD-10-CM

## 2022-07-15 PROCEDURE — 96372 THER/PROPH/DIAG INJ SC/IM: CPT

## 2022-07-15 RX ADMIN — MEDROXYPROGESTERONE ACETATE 150 MG: 150 INJECTION, SUSPENSION INTRAMUSCULAR at 14:28

## 2022-07-15 NOTE — PROGRESS NOTES
Depo-Provera      [x]   Patient provided box   o 1 Refills remain  o Refills submitted no   Last  Annual Date / Birth control check : 07/29/2021, pt scheduled for 08/01/2022 annual   Last Depo date: 04/22/2022   Side effects: no   HCG: no     Given by: Perry Godfrey MA   Site: Left Deltoid    o Calcium supplement daily teaching  o Condoms for 2 weeks following first injection dose

## 2022-07-18 ENCOUNTER — APPOINTMENT (OUTPATIENT)
Dept: PHYSICAL THERAPY | Facility: CLINIC | Age: 47
End: 2022-07-18
Payer: COMMERCIAL

## 2022-07-22 ENCOUNTER — APPOINTMENT (OUTPATIENT)
Dept: PHYSICAL THERAPY | Facility: CLINIC | Age: 47
End: 2022-07-22
Payer: COMMERCIAL

## 2022-07-22 ENCOUNTER — TELEPHONE (OUTPATIENT)
Dept: OBGYN CLINIC | Facility: CLINIC | Age: 47
End: 2022-07-22

## 2022-07-22 NOTE — TELEPHONE ENCOUNTER
Call received from [facility] at [address]  asking for pre cert for MRI for [facility]  Was informed she is scheduled for Monday, 7/25  As I was unaware that [facility] was going to this outside facility, pre cert had not been started  I did start pre cert following the phone call, however, clinical needed to be sent to Insurance for review  I contacted [facility] back letting her know that the pre cert is pending review  She said they would hold off until Monday morning to see if pre cert comes through, if it doesn't they will contact the patient and let her know

## 2022-07-25 ENCOUNTER — APPOINTMENT (OUTPATIENT)
Dept: PHYSICAL THERAPY | Facility: CLINIC | Age: 47
End: 2022-07-25
Payer: COMMERCIAL

## 2022-07-27 ENCOUNTER — TELEPHONE (OUTPATIENT)
Dept: OBGYN CLINIC | Facility: CLINIC | Age: 47
End: 2022-07-27

## 2022-07-27 DIAGNOSIS — Z98.890 S/P ARTHROSCOPY OF RIGHT SHOULDER: Primary | ICD-10-CM

## 2022-07-27 NOTE — TELEPHONE ENCOUNTER
I reached out to Artesia Wells via [facility] Nell[facility] Porras  I explained that she will require a new Xray before the Insurance will approve the MRI  She will come into the [place] office on Monday to have the new Xray done  She verbalized understanding via the

## 2022-08-01 ENCOUNTER — ANNUAL EXAM (OUTPATIENT)
Dept: OBGYN CLINIC | Facility: CLINIC | Age: 47
End: 2022-08-01

## 2022-08-01 ENCOUNTER — APPOINTMENT (OUTPATIENT)
Dept: RADIOLOGY | Facility: CLINIC | Age: 47
End: 2022-08-01
Payer: COMMERCIAL

## 2022-08-01 VITALS
WEIGHT: 202.4 LBS | HEIGHT: 61 IN | HEART RATE: 64 BPM | BODY MASS INDEX: 38.21 KG/M2 | DIASTOLIC BLOOD PRESSURE: 81 MMHG | SYSTOLIC BLOOD PRESSURE: 126 MMHG

## 2022-08-01 DIAGNOSIS — Z30.42 ENCOUNTER FOR SURVEILLANCE OF INJECTABLE CONTRACEPTIVE: ICD-10-CM

## 2022-08-01 DIAGNOSIS — Z01.419 ENCOUNTER FOR GYNECOLOGICAL EXAMINATION WITHOUT ABNORMAL FINDING: Primary | ICD-10-CM

## 2022-08-01 DIAGNOSIS — Z12.31 ENCOUNTER FOR SCREENING MAMMOGRAM FOR MALIGNANT NEOPLASM OF BREAST: ICD-10-CM

## 2022-08-01 DIAGNOSIS — Z98.890 S/P ARTHROSCOPY OF RIGHT SHOULDER: ICD-10-CM

## 2022-08-01 PROCEDURE — 73030 X-RAY EXAM OF SHOULDER: CPT

## 2022-08-01 PROCEDURE — S0612 ANNUAL GYNECOLOGICAL EXAMINA: HCPCS | Performed by: NURSE PRACTITIONER

## 2022-08-01 RX ORDER — MEDROXYPROGESTERONE ACETATE 150 MG/ML
150 INJECTION, SUSPENSION INTRAMUSCULAR
Qty: 1 ML | Refills: 4 | Status: SHIPPED | OUTPATIENT
Start: 2022-08-01

## 2022-08-01 NOTE — PROGRESS NOTES
After Visit Summary   5/26/2018    Nancy Clark    MRN: [id number]           Patient Information     Date Of Birth          2016        Visit Information        Provider Department      5/26/2018 7:10 PM Cesar Bautista PA-C [facility]        Today's Diagnoses     OME (otitis media with effusion), bilateral    -  1       Follow-ups after your visit        Who to contact     If you have questions or need follow up information about today's clinic visit or your schedule please contact [facility] directly at [phone].  Normal or non-critical lab and imaging results will be communicated to you by Altobridgehart, letter or phone within 4 business days after the clinic has received the results. If you do not hear from us within 7 days, please contact the clinic through Altobridgehart or phone. If you have a critical or abnormal lab result, we will notify you by phone as soon as possible.  Submit refill requests through Dreamise or call your pharmacy and they will forward the refill request to us. Please allow 3 business days for your refill to be completed.          Additional Information About Your Visit        MyChart Information     Dreamise lets you send messages to your doctor, view your test results, renew your prescriptions, schedule appointments and more. To sign up, go to [email or web address], contact your [facility] clinic or call [phone] during business hours.            Care EveryWhere ID     This is your Care EveryWhere ID. This could be used by other organizations to access your [facility] medical records  [id number]        Your Vitals Were     Pulse Temperature Pulse Oximetry             132 99.9  F (37.7  C) (Tympanic) 97%          Blood Pressure from Last 3 Encounters:   No data found for BP    Weight from Last 3 Encounters:   05/26/18 28 lb (12.7 kg) (33 %)*   04/08/18 29 lb (13.2 kg) (51 %)*     * Growth percentiles are based on CDC 2-20  ASSESSMENT & PLAN: Miguelito Qureshi is a 51 y o  [id number] with normal gynecologic exam     1   Routine well woman exam done today  2  Pap and HPV:  The patient's last pap and hpv was 06/24/2019  It was normal     Pap and cotesting was not done today  Current ASCCP Guidelines reviewed  Next due 06/24/2024  3  Bilateral screening 3D  Mammogram ordered, patient is currently due  4  The following were reviewed in today's visit: breast self exam, mammography screening ordered, STD testing, adequate intake of calcium and vitamin D, exercise and healthy diet  5  Depo-Provera for contraception, she desires to continue, her last injection was 07/15/2022  Prescription renewed for 1 year    BMI Counseling: Body mass index is 38 24 kg/m²  The BMI is above normal  Nutrition recommendations include reducing portion sizes, 3-5 servings of fruits/vegetables daily, moderation in carbohydrate intake and reducing intake of cholesterol  Exercise recommendations include exercising 3-5 times per week  Depression Screening Follow-up Plan: Patient's depression screening was positive with a PHQ-2 score of 0 Their PHQ-9 score was 0   Clinically patient does not have depression  No treatment is required  CC:  Annual Gynecologic Examination    HPI: Miguelito Qureshi is a 51 y o  [id number] who presents for annual gynecologic examination   done by [id number]  Her last Pap and HPV was negative 6/24/2019  Denies abnormal Pap history  Her last mammogram was 06/15/2021 and was BI-RADS 1  She is on Depo-Provera for contraception, menses are every 2 months and last about 3 days, flow is light   She has the following concerns:  None    Health Maintenance:    She wears her seatbelt routinely  She does perform regular monthly self breast exams  She feels safe at home       Patient Active Problem List   Diagnosis    Encounter for Depo-Provera contraception    Screening for malignant neoplasm of breast    Encounter for gynecological examination without abnormal finding    Encounter for surveillance of injectable contraceptive    Annual physical exam    History of gestational diabetes    Vision problem    Obesity (BMI 30-39  9)       Past Medical History:   Diagnosis Date    Gestational diabetes        Past Surgical History:   Procedure Laterality Date     SECTION       SECTION      [place] SHLDR ARTHROSCOP,SURG,W/ROTAT CUFF REPR Right 1/10/2022    Procedure: DIAGNOSTIC ARTHROSCOPY WITH BICEPS TENODESIS, SUBACROMIAL DECOMPRESSION, AND LIMITED DEBRIDEMENT;  Surgeon: John Condon MD;  Location: [facility];  Service: Orthopedics       Past OB/Gyn History:  OB History        2    Para   2    Term   2            AB        Living   2       SAB        IAB        Ectopic        Multiple        Live Births   2                  Pt does not have menstrual issues  Every 2 months x3 days  History of sexually transmitted infection: No   History of abnormal pap smears: No      Patient is currently sexually active  heterosexual  The current method of family planning is Depo-Provera injections      Family History   Problem Relation Age of Onset    No Known Problems Mother     Lung cancer Father 62        smoker    No Known Problems Sister     No Known Problems Brother     No Known Problems Brother     No Known Problems Brother     No Known Problems Brother     No Known Problems Brother     No Known Problems Daughter     No Known Problems Daughter     Stomach cancer Maternal Grandmother     No Known Problems Maternal Grandfather     Stomach cancer Paternal Grandmother     No Known Problems Paternal Grandfather     Breast cancer Neg Hx     Colon cancer Neg Hx     Ovarian cancer Neg Hx        Social History:  Social History     Socioeconomic History    Marital status:      Spouse name: Not on file    Number of children: 2    Years of education: Not on Years data.              We Performed the Following     Beta strep group A culture     Strep, Rapid Screen          Today's Medication Changes          These changes are accurate as of 5/26/18  8:30 PM.  If you have any questions, ask your nurse or doctor.               Start taking these medicines.        Dose/Directions    amoxicillin-clavulanate 600-42.9 MG/5ML suspension   Commonly known as:  AUGMENTIN-ES   Used for:  OME (otitis media with effusion), bilateral   Started by:  Cesar Bautista PA-C        5 ml 2x daily for 10 days   Quantity:  100 mL   Refills:  0            Where to get your medicines      These medications were sent to [id number] - [place], [place] - [address] AT [facility]  [address], [place] [address]    Hours:  24-hours Phone:  [phone]     amoxicillin-clavulanate 600-42.9 MG/5ML suspension                Primary Care Provider Fax #    Physician No Ref-Primary [phone]       No address on file        Equal Access to Services     YANELY ABDI : Hadii sidney ku hadasho Soomaali, waaxda luqadaha, qaybta kaalmada adeegyada, waxay houstonin haysevero molina . So [facility] [phone].    ATENCIÓN: Si habla español, tiene a hyman disposición servicios gratuitos de asistencia lingüística. [facility] [phone].    We comply with applicable federal civil rights laws and [place] laws. We do not discriminate on the basis of race, color, national origin, age, disability, sex, sexual orientation, or gender identity.            Thank you!     Thank you for choosing [facility]  for your care. Our goal is always to provide you with excellent care. Hearing back from our patients is one way we can continue to improve our services. Please take a few minutes to complete the written survey that you may receive in the mail after your visit with us. Thank you!             Your Updated Medication List - Protect others around you:  Learn how to safely use, store and throw away your medicines at www.disposemymeds.org.          This list is accurate as of 5/26/18  8:30 PM.  Always use your most recent med list.                   Brand Name Dispense Instructions for use Diagnosis    amoxicillin-clavulanate 600-42.9 MG/5ML suspension    AUGMENTIN-ES    100 mL    5 ml 2x daily for 10 days    OME (otitis media with effusion), bilateral          file    Highest education level: Not on file   Occupational History    Not on file   Tobacco Use    Smoking status: Never Smoker    Smokeless tobacco: Never Used   Vaping Use    Vaping Use: Never used   Substance and Sexual Activity    Alcohol use: No    Drug use: No    Sexual activity: Yes     Partners: Male     Birth control/protection: Injection   Other Topics Concern    Not on file   Social History Narrative    Not on file     Social Determinants of Health     Financial Resource Strain: Low Risk     Difficulty of Paying Living Expenses: Not hard at all   Food Insecurity: No Food Insecurity    Worried About [address] in the Last Year: Never true    [address] [facility] [address] in the Last Year: Never true   Transportation Needs: No Transportation Needs    Lack of Transportation (Medical): No    Lack of Transportation (Non-Medical): No   Physical Activity: Sufficiently Active    Days of Exercise per Week: 3 days    Minutes of Exercise per Session: 60 min   Stress: No Stress Concern Present    Feeling of Stress : Not at all   Social Connections: Moderately Isolated    Frequency of Communication with Friends and Family: More than three times a week    Frequency of Social Gatherings with Friends and Family: More than three times a week    Attends Orthodox Services: More than 4 times per year    Active Member of [employer] or Organizations: No    Attends Club or Organization Meetings: Never    Marital Status:    Intimate Partner Violence: Not At Risk    Fear of Current or Ex-Partner: No    Emotionally Abused: No    Physically Abused: No    Sexually Abused: No   Housing Stability: [address] Unable to Pay for Housing in the Last Year: No    Number of [place] in the Last Year: 1    Unstable Housing in the Last Year: No     Presently lives with family  Patient is     Patient is currently employed   No Known Allergies    Current Outpatient Medications:    medroxyPROGESTERone (DEPO-PROVERA) 150 mg/mL injection, Inject 1 mL (150 mg total) into a muscle every 3 (three) months, Disp: 1 mL, Rfl: 4    oxyCODONE (Roxicodone) 5 immediate release tablet, Take 1 tablet (5 mg total) by mouth every 4 (four) hours as needed for moderate pain for up to 15 doses Max Daily Amount: 30 mg (Patient not taking: No sig reported), Disp: 15 tablet, Rfl: 0    Current Facility-Administered Medications:     medroxyPROGESTERone (DEPO-PROVERA) IM injection 150 mg, 150 mg, Intramuscular, Q3 Months, PETER Iverson, 150 mg at 07/15/22 1428    Review of Systems:    Review of Systems  Constitutional :no fever, feels well, no tiredness, no recent weight gain or loss  ENT: no ear ache, no loss of hearing, no nosebleeds or nasal discharge, no sore throat or hoarseness  Cardiovascular: no complaints of slow or fast heart beat, no chest pain, no palpitations, no leg claudication or lower extremity edema  Respiratory: no complaints of shortness of shortness of breath, no VENCES  Breasts:no complaints of breast pain, breast lump, or nipple discharge  Gastrointestinal: no complaints of abdominal pain, constipation, nausea, vomiting, or diarrhea or bloody stools  Genitourinary : no complaints of dysuria, incontinence, pelvic pain, no dysmenorrhea, vaginal discharge or abnormal vaginal bleeding and as noted in HPI  Musculoskeletal: no complaints of arthralgia, no myalgia, no joint swelling or stiffness, no limb pain or swelling    Integumentary: no complaints of skin rash or lesion, itching or dry skin  Neurological: no complaints of headache, no confusion, no numbness or tingling, no dizziness or fainting    Objective      /81   Pulse 64   Ht 5' 1" (1 549 m)   Wt 91 8 kg (202 lb 6 4 oz)   LMP 07/08/2022   BMI 38 24 kg/m²     General:   appears stated age, cooperative, alert normal mood and affect   Neck: normal, supple,trachea midline, no masses   Heart: regular rate and rhythm, S1, S2 normal, no murmur, click, rub or gallop   Lungs: clear to auscultation bilaterally   Breasts: normal appearance, no masses or tenderness, Inspection negative, No nipple retraction or dimpling, No nipple discharge or bleeding, No axillary or supraclavicular adenopathy, Normal to palpation without dominant masses, Taught monthly breast self examination   Abdomen: soft, non-tender, without masses or organomegaly   Vulva: normal female genitalia, Bartholin's, Urethra, [place] normal   Vagina: normal vagina, no discharge, exudate, lesion, or erythema   Urethra: normal   Cervix: Normal, no discharge  Nontender  Uterus: normal size, contour, position, consistency, mobility, non-tender and anteverted   Adnexa: normal adnexa and no mass, fullness, tenderness   Lymphatic palpation of lymph nodes in neck, axilla, groin and/or other locations: no lymphadenopathy or masses noted   Skin normal skin turgor and no rashes     Psychiatric orientation to person, place, and time: normal  mood and affect: normal

## 2022-08-04 ENCOUNTER — TELEPHONE (OUTPATIENT)
Dept: PAIN MEDICINE | Facility: CLINIC | Age: 47
End: 2022-08-04

## 2022-08-04 NOTE — TELEPHONE ENCOUNTER
[facility] has calling in stating patient has an MRI tomorrow of the R shoulder ordered by Ronaldo Valdes who is still awaiting a pre auth  Patient was originally scheduled for July 20th but has been rescheduled two times because they are still awaiting to hear about an [employer]  Radiology wpuld like a call back at   [phone]   With some information on what to tell patient if they need to reschedule  Could you help with this?

## 2022-08-04 NOTE — TELEPHONE ENCOUNTER
I spoke with Rosetta Humphrey at the facility  I explained that this appears to be a WC  She is going to contact the patient, have the patient call [facility] [employer] and see if they have an approval already through them  Rosetta Humphrey will call me back to let me know that if the MRI is definitely going through [facility] or not    If it is WC, then there would be no need to contact her primary health ins for an approval   (Of note, when attempting to get approval through her primary health ins previously, MRI was denied, and an appeal has been started with the health plan directly)

## 2022-08-04 NOTE — TELEPHONE ENCOUNTER
Syd Key called back from [address]  She asked if we had an employee here that spoke [place]  We do not  She asked how we communicate with Zach Coley, I explained that we communicate via an  over the phone  She asked if I would try to reach Zach Coley to have her contact [facility] to see if they can confirm that she is good to have her MRI tomorrow there  I said that I would  I tried to reach Zach Coley via [phone], however, she did not answer  Message left for her asking her to reach out to [facility] regarding her MRI tomorrow  IF patient calls back, please ask her to contact [facility]  [phone] to verify if she can have her MRI tomorrow as well as contact [facility]

## 2022-09-02 ENCOUNTER — OFFICE VISIT (OUTPATIENT)
Dept: OBGYN CLINIC | Facility: CLINIC | Age: 47
End: 2022-09-02
Payer: COMMERCIAL

## 2022-09-02 VITALS
TEMPERATURE: 98.2 F | WEIGHT: 205 LBS | HEIGHT: 61 IN | SYSTOLIC BLOOD PRESSURE: 121 MMHG | RESPIRATION RATE: 19 BRPM | DIASTOLIC BLOOD PRESSURE: 80 MMHG | BODY MASS INDEX: 38.71 KG/M2 | HEART RATE: 78 BPM

## 2022-09-02 DIAGNOSIS — Z98.890 S/P ARTHROSCOPY OF RIGHT SHOULDER: Primary | ICD-10-CM

## 2022-09-02 PROCEDURE — 99214 OFFICE O/P EST MOD 30 MIN: CPT | Performed by: ORTHOPAEDIC SURGERY

## 2022-09-02 NOTE — PROGRESS NOTES
Assessment/Plan:  No diagnosis found  Scribe Attestation    I,:  Kuldip Schuster am acting as a scribe while in the presence of the attending physician :       I,:  Lizett Saha MD personally performed the services described in this documentation    as scribed in my presence  :             ***    Subjective:   Eric Stokes is a 51 y o  female who presents follow-up evaluation of the right shoulder  She is over 7 months status post arthroscopic the biceps tenodesis with subacromial decompression  This is a workman's compensation case  Patient is Cuban-speaking  She reports significant pain and weakness in the right shoulder  She reports difficulty with ADLs  She was compliant with formal physical therapy  Review of Systems   Constitutional: Negative for chills, fever and unexpected weight change  HENT: Negative for nosebleeds and sore throat  Eyes: Negative for pain, redness and visual disturbance  Respiratory: Negative for cough, shortness of breath and wheezing  Cardiovascular: Negative for chest pain, palpitations and leg swelling  Gastrointestinal: Negative for nausea and vomiting  Endocrine: Negative for polydipsia and polyuria  Genitourinary: Negative for dysuria and hematuria  Musculoskeletal: Positive for arthralgias and myalgias  As noted in HPI   Skin: Negative for rash and wound  Neurological: Negative for dizziness, numbness and headaches  Psychiatric/Behavioral: Negative for decreased concentration  The patient is not nervous/anxious            Past Medical History:   Diagnosis Date    Gestational diabetes        Past Surgical History:   Procedure Laterality Date     SECTION       SECTION  2016    [place] [address] ARTHROSCOP,SURG,W/ROTAT CUFF REPR Right 1/10/2022    Procedure: DIAGNOSTIC ARTHROSCOPY WITH BICEPS TENODESIS, SUBACROMIAL DECOMPRESSION, AND LIMITED DEBRIDEMENT;  Surgeon: Lizett Saha MD;  Location: [address] [place] [address]; Service: Orthopedics       Family History   Problem Relation Age of Onset    No Known Problems Mother     Lung cancer Father 62        smoker    No Known Problems Sister     No Known Problems Brother     No Known Problems Brother     No Known Problems Brother     No Known Problems Brother     No Known Problems Brother     No Known Problems Daughter     No Known Problems Daughter     Stomach cancer Maternal Grandmother     No Known Problems Maternal Grandfather     Stomach cancer Paternal Grandmother     No Known Problems Paternal Grandfather     Breast cancer Neg Hx     Colon cancer Neg Hx     Ovarian cancer Neg Hx        Social History     Occupational History    Not on file   Tobacco Use    Smoking status: Never Smoker    Smokeless tobacco: Never Used   Vaping Use    Vaping Use: Never used   Substance and Sexual Activity    Alcohol use: No    Drug use: No    Sexual activity: Yes     Partners: Male     Birth control/protection: Injection         Current Outpatient Medications:     medroxyPROGESTERone (DEPO-PROVERA) 150 mg/mL injection, Inject 1 mL (150 mg total) into a muscle every 3 (three) months, Disp: 1 mL, Rfl: 4    oxyCODONE (Roxicodone) 5 immediate release tablet, Take 1 tablet (5 mg total) by mouth every 4 (four) hours as needed for moderate pain for up to 15 doses Max Daily Amount: 30 mg, Disp: 15 tablet, Rfl: 0    Current Facility-Administered Medications:     medroxyPROGESTERone (DEPO-PROVERA) IM injection 150 mg, 150 mg, Intramuscular, Q3 Months, PETER Iverson, 150 mg at 07/15/22 1428    No Known Allergies    Objective:  Vitals:    09/02/22 1009   BP: 121/80   Pulse: 78   Resp: 19   Temp: 98 2 °F (36 8 °C)       Ortho Exam    Physical Exam  Vitals reviewed  HENT:      Head: Normocephalic and atraumatic  Eyes:      General:         Right eye: No discharge  Left eye: No discharge        Conjunctiva/sclera: Conjunctivae normal       Pupils: Pupils are equal, round, and reactive to light  Cardiovascular:      Rate and Rhythm: Normal rate  Pulses: Normal pulses  Musculoskeletal:      Cervical back: Normal range of motion and neck supple  Comments: As noted in HPI   Skin:     General: Skin is warm  Neurological:      Mental Status: She is alert  I have personally reviewed pertinent films in PACS and my interpretation is as follows:  ***      This document was created using speech voice recognition software  Grammatical errors, random word insertions, pronoun errors, and incomplete sentences are an occasional consequence of this system due to software limitations, ambient noise, and hardware issues  Any formal questions or concerns about content, text, or information contained within the body of this dictation should be directly addressed to the provider for clarification

## 2022-09-02 NOTE — PROGRESS NOTES
Assessment/Plan:  1  S/P arthroscopy of right shoulder  Ambulatory Referral to Orthopedic Surgery     Scribe Attestation    I,:  Trisha Clement am acting as a scribe while in the presence of the attending physician :       I,:  Jeromy Crowder MD personally performed the services described in this documentation    as scribed in my presence :         Pastoraluther Santos, upon exam today demonstrates minimal improvements in symptoms  Her MRI shows mild tendonitis of the rotator cuff and mild osteoarthritis of the glenohumeral joint and an intact biceps tenodesis  She demonstrates limited range of motion and decreased global shoulder strength today  We discussed continuing formal physical therapy as a nonoperative measure or a possible diagnostic arthroscopy  We also discussed the patient seeing another orthopedic physican for a second opinion prior to undergoing another operation  The patient expressed verbal understanding of this treatment plan and would like to see Dr Tommy Monte for 2nd opinion  To help the patient decrease pain when sleeping I recommend OTC pain medications, ice, or heat as needed  I would like to see her back after she visits with Dr Tommy Monte for repeat clinical evaluation and determine whether to proceed with a diagnostic arthroscopy  Subjective:   Iam Pierre is a 51 y o  female who presents for follow-up evaluation of the right shoulder  She is over 7 months status post arthroscopic biceps tenodesis with subacromial decompression  This is a workman's compensation case  Patient is Malay-speaking  She reports significant pain and weakness in the right shoulder  She also mentions swelling noticed in the forearm and hands  She was compliant with formal physical therapy  She is worried about returning to work as her right shoulder is still not doing well  She reports significant difficulty with ADLs especially with dressing herself  She also reports difficulty with sleeping    She expresses significant frustration about her progress as well  Review of Systems   Constitutional: Negative for chills, fever and unexpected weight change  HENT: Negative for nosebleeds and sore throat  Eyes: Negative for pain, redness and visual disturbance  Respiratory: Negative for cough, shortness of breath and wheezing  Cardiovascular: Negative for chest pain, palpitations and leg swelling  Gastrointestinal: Negative for nausea and vomiting  Endocrine: Negative for polydipsia and polyuria  Genitourinary: Negative for dysuria and hematuria  Musculoskeletal: Positive for arthralgias and myalgias  Skin: Negative for rash and wound  Neurological: Negative for dizziness, numbness and headaches  Psychiatric/Behavioral: Negative for decreased concentration  The patient is not nervous/anxious            Past Medical History:   Diagnosis Date    Gestational diabetes        Past Surgical History:   Procedure Laterality Date     SECTION       SECTION      [place] SHLDR ARTHROSCOP,SURG,W/ROTAT CUFF REPR Right 1/10/2022    Procedure: DIAGNOSTIC ARTHROSCOPY WITH BICEPS TENODESIS, SUBACROMIAL DECOMPRESSION, AND LIMITED DEBRIDEMENT;  Surgeon: Svetlana Garcia MD;  Location: [facility];  Service: Orthopedics       Family History   Problem Relation Age of Onset    No Known Problems Mother     Lung cancer Father 62        smoker    No Known Problems Sister     No Known Problems Brother     No Known Problems Brother     No Known Problems Brother     No Known Problems Brother     No Known Problems Brother     No Known Problems Daughter     No Known Problems Daughter     Stomach cancer Maternal Grandmother     No Known Problems Maternal Grandfather     Stomach cancer Paternal Grandmother     No Known Problems Paternal Grandfather     Breast cancer Neg Hx     Colon cancer Neg Hx     Ovarian cancer Neg Hx        Social History     Occupational History    Not on file Tobacco Use    Smoking status: Never Smoker    Smokeless tobacco: Never Used   Vaping Use    Vaping Use: Never used   Substance and Sexual Activity    Alcohol use: No    Drug use: No    Sexual activity: Yes     Partners: Male     Birth control/protection: Injection         Current Outpatient Medications:     medroxyPROGESTERone (DEPO-PROVERA) 150 mg/mL injection, Inject 1 mL (150 mg total) into a muscle every 3 (three) months, Disp: 1 mL, Rfl: 4    oxyCODONE (Roxicodone) 5 immediate release tablet, Take 1 tablet (5 mg total) by mouth every 4 (four) hours as needed for moderate pain for up to 15 doses Max Daily Amount: 30 mg, Disp: 15 tablet, Rfl: 0    Current Facility-Administered Medications:     medroxyPROGESTERone (DEPO-PROVERA) IM injection 150 mg, 150 mg, Intramuscular, Q3 Months, PETER Iverson, 150 mg at 07/15/22 1428    No Known Allergies    Objective:  Vitals:    09/02/22 1009   BP: 121/80   Pulse: 78   Resp: 19   Temp: 98 2 °F (36 8 °C)       Right Shoulder Exam     Tenderness   Right shoulder tenderness location: greater tuberosity  Range of Motion   Active abduction: 100   External rotation: 60   Internal rotation 0 degrees: Sacrum     Muscle Strength   Abduction: 4/5   Internal rotation: 4/5   External rotation: 4/5     Other   Scars: present (well healed portal scars)  Sensation: normal  Pulse: present            Physical Exam  Vitals reviewed  HENT:      Head: Normocephalic and atraumatic  Eyes:      General:         Right eye: No discharge  Left eye: No discharge  Conjunctiva/sclera: Conjunctivae normal       Pupils: Pupils are equal, round, and reactive to light  Cardiovascular:      Rate and Rhythm: Normal rate  Pulses: Normal pulses  Musculoskeletal:      Right shoulder: Decreased range of motion  Decreased strength  Cervical back: Normal range of motion and neck supple  Comments: As noted in HPI   Skin:     General: Skin is warm  Neurological:      Mental Status: She is alert  I have personally reviewed pertinent films in PACS and my interpretation is as follows:  MRI of the right shoulder obtained from an outside facility demonstrates mild tendinitis the rotator cuff and postsurgical changes associated biceps tenodesis  No obvious evidence of rotator cuff tear  This document was created using speech voice recognition software  Grammatical errors, random word insertions, pronoun errors, and incomplete sentences are an occasional consequence of this system due to software limitations, ambient noise, and hardware issues  Any formal questions or concerns about content, text, or information contained within the body of this dictation should be directly addressed to the provider for clarification

## 2022-09-06 ENCOUNTER — TELEPHONE (OUTPATIENT)
Dept: OBGYN CLINIC | Facility: HOSPITAL | Age: 47
End: 2022-09-06

## 2022-09-06 ENCOUNTER — TELEPHONE (OUTPATIENT)
Dept: PAIN MEDICINE | Facility: CLINIC | Age: 47
End: 2022-09-06

## 2022-09-06 NOTE — TELEPHONE ENCOUNTER
Pt was seen 9/2/22 is requesting a letter for her job with the undated date please (9/2/22) He  will  the letter when it is completed  He can be reached at [phone]  Ty    There is a letter from 04/15/22, pt would like the new letter to mirror that one please

## 2022-09-06 NOTE — TELEPHONE ENCOUNTER
Please review and advise, please advise if we are keeping patient out of work at this time or if she is able to work with restrictions

## 2022-09-06 NOTE — TELEPHONE ENCOUNTER
Patient sees Dr Yessenia Mobley from  is calling to request a work status letter from the 9/2 appt        Office notes already faxed        [address] [facility]: [phone]

## 2022-09-12 ENCOUNTER — TELEPHONE (OUTPATIENT)
Dept: OBGYN CLINIC | Facility: HOSPITAL | Age: 47
End: 2022-09-12

## 2022-09-12 NOTE — TELEPHONE ENCOUNTER
Pt sees Dr Susan Phillips from [address] [place] [address] w/c requested pt OVN    Faxed to [phone]

## 2022-10-07 ENCOUNTER — CLINICAL SUPPORT (OUTPATIENT)
Dept: OBGYN CLINIC | Facility: CLINIC | Age: 47
End: 2022-10-07

## 2022-10-07 DIAGNOSIS — Z30.42 ENCOUNTER FOR DEPO-PROVERA CONTRACEPTION: ICD-10-CM

## 2022-10-07 PROCEDURE — 96372 THER/PROPH/DIAG INJ SC/IM: CPT

## 2022-10-07 RX ADMIN — MEDROXYPROGESTERONE ACETATE 150 MG: 150 INJECTION, SUSPENSION INTRAMUSCULAR at 11:22

## 2022-10-07 NOTE — PROGRESS NOTES
Depo-Provera      [x]   Patient provided box    o 4 Refills remain  o Refills submitted no   Last  Annual Date / Birth control check : 08/01/2022   Last Depo date: 07/15/2022   Side effects: no   HCG: no     Given by: Efra Boo MA   Site: Left Deltoid    o Calcium supplement daily teaching  o Condoms for 2 weeks following first injection dose

## 2022-10-18 ENCOUNTER — TELEPHONE (OUTPATIENT)
Dept: OBGYN CLINIC | Facility: CLINIC | Age: 47
End: 2022-10-18

## 2022-11-08 ENCOUNTER — TELEPHONE (OUTPATIENT)
Dept: OBGYN CLINIC | Facility: HOSPITAL | Age: 47
End: 2022-11-08

## 2022-11-08 NOTE — TELEPHONE ENCOUNTER
Caller: [facility]    Doctor: Brian Morris    Reason for call: Afsaneh Hopping asking if patient was seen by Dr Brian Morris yet  Advised that she has not rescheduled at this time  Understanding verbalized       Call back#: n/a

## 2022-11-22 ENCOUNTER — TELEPHONE (OUTPATIENT)
Dept: OBGYN CLINIC | Facility: HOSPITAL | Age: 47
End: 2022-11-22

## 2022-11-22 NOTE — TELEPHONE ENCOUNTER
Caller: [facility]    Doctor: Simona Holcomb    Reason for call: Tyler Parker called to see if patient rescheduled her no Oct appointment   Advised no rescheduled visits    Call back#: na

## 2022-12-07 ENCOUNTER — TELEPHONE (OUTPATIENT)
Dept: OBGYN CLINIC | Facility: HOSPITAL | Age: 47
End: 2022-12-07

## 2022-12-07 NOTE — TELEPHONE ENCOUNTER
Caller: [facility]    Doctor: N/A     Reason for call: Called to see if patient rescheduled her appointment with Dr Tyrone Alexander from 10/26  She was a no show  Advised she has not been seen and she is not scheduled  Understanding verbalized       Call back#: N/A

## 2022-12-23 ENCOUNTER — CLINICAL SUPPORT (OUTPATIENT)
Dept: OBGYN CLINIC | Facility: CLINIC | Age: 47
End: 2022-12-23

## 2022-12-23 DIAGNOSIS — Z30.42 ENCOUNTER FOR DEPO-PROVERA CONTRACEPTION: ICD-10-CM

## 2022-12-23 RX ADMIN — MEDROXYPROGESTERONE ACETATE 150 MG: 150 INJECTION, SUSPENSION INTRAMUSCULAR at 11:22

## 2022-12-23 NOTE — PROGRESS NOTES
Depo-Provera     • [x]   Patient provided box    o 3 Refills remain  o Refills submitted no  • Last  Annual Date / Birth control check : 08/01/2022  • Last Depo date: 10/07/2022  • Side effects: no  • HCG: no    • Given by: Sergio Baer MA  • Site: Left Deltoid    o Calcium supplement daily teaching  o Condoms for 2 weeks following first injection dose

## 2023-01-23 ENCOUNTER — HOSPITAL ENCOUNTER (OUTPATIENT)
Dept: MAMMOGRAPHY | Facility: HOSPITAL | Age: 48
Discharge: HOME | End: 2023-01-23

## 2023-01-23 VITALS — WEIGHT: 200 LBS | BODY MASS INDEX: 37.76 KG/M2 | HEIGHT: 61 IN

## 2023-01-23 DIAGNOSIS — Z12.31 ENCOUNTER FOR SCREENING MAMMOGRAM FOR MALIGNANT NEOPLASM OF BREAST: ICD-10-CM

## 2023-01-26 ENCOUNTER — TELEPHONE (OUTPATIENT)
Dept: OBGYN CLINIC | Facility: CLINIC | Age: 48
End: 2023-01-26

## 2023-01-26 NOTE — TELEPHONE ENCOUNTER
----- Message from Abhishek Henriquez, [address] sent at 1/25/2023  1:09 PM EST -----   Mammogram Result is negative,  next screening due in 1 year, please call patient to inform

## 2024-02-21 PROBLEM — Z12.39 SCREENING FOR MALIGNANT NEOPLASM OF BREAST: Status: RESOLVED | Noted: 2019-06-24 | Resolved: 2024-02-21

## 2024-02-21 PROBLEM — Z01.419 ENCOUNTER FOR GYNECOLOGICAL EXAMINATION WITHOUT ABNORMAL FINDING: Status: RESOLVED | Noted: 2019-06-24 | Resolved: 2024-02-21

## 2024-06-17 ENCOUNTER — TELEPHONE (OUTPATIENT)
Age: 49
End: 2024-06-17

## 2024-07-19 ENCOUNTER — OFFICE VISIT (OUTPATIENT)
Dept: FAMILY MEDICINE CLINIC | Facility: CLINIC | Age: 49
End: 2024-07-19
Payer: COMMERCIAL

## 2024-07-19 VITALS
BODY MASS INDEX: 39.27 KG/M2 | SYSTOLIC BLOOD PRESSURE: 120 MMHG | RESPIRATION RATE: 16 BRPM | OXYGEN SATURATION: 99 % | WEIGHT: 208 LBS | DIASTOLIC BLOOD PRESSURE: 80 MMHG | HEART RATE: 72 BPM | TEMPERATURE: 97.6 F | HEIGHT: 61 IN

## 2024-07-19 DIAGNOSIS — E66.9 OBESITY (BMI 30-39.9): ICD-10-CM

## 2024-07-19 DIAGNOSIS — Z01.419 ENCOUNTER FOR ANNUAL ROUTINE GYNECOLOGICAL EXAMINATION: ICD-10-CM

## 2024-07-19 DIAGNOSIS — Z11.59 NEED FOR HEPATITIS C SCREENING TEST: ICD-10-CM

## 2024-07-19 DIAGNOSIS — Z00.00 ANNUAL PHYSICAL EXAM: Primary | ICD-10-CM

## 2024-07-19 DIAGNOSIS — Z12.31 BREAST CANCER SCREENING BY MAMMOGRAM: ICD-10-CM

## 2024-07-19 DIAGNOSIS — Z86.32 HISTORY OF GESTATIONAL DIABETES: ICD-10-CM

## 2024-07-19 DIAGNOSIS — Z12.11 COLON CANCER SCREENING: ICD-10-CM

## 2024-07-19 PROCEDURE — 99386 PREV VISIT NEW AGE 40-64: CPT | Performed by: FAMILY MEDICINE

## 2024-07-19 NOTE — PROGRESS NOTES
Subjective: new  Patient here for annual physical       Patient ID: Frances Newton is a 48 y.o. female.    HPI    Past Medical History:   Diagnosis Date   • Gestational diabetes        Family History   Problem Relation Age of Onset   • No Known Problems Mother    • Lung cancer Father 58        smoker   • No Known Problems Sister    • No Known Problems Brother    • No Known Problems Brother    • No Known Problems Brother    • No Known Problems Brother    • No Known Problems Brother    • No Known Problems Daughter    • No Known Problems Daughter    • Stomach cancer Maternal Grandmother    • No Known Problems Maternal Grandfather    • Stomach cancer Paternal Grandmother    • No Known Problems Paternal Grandfather    • Breast cancer Neg Hx    • Colon cancer Neg Hx    • Ovarian cancer Neg Hx        Past Surgical History:   Procedure Laterality Date   •  SECTION     •  SECTION     • [place] SURGICAL ARTHROSCOPY SHOULDER W/ROTATOR CUFF RPR Right 1/10/2022    Procedure: DIAGNOSTIC ARTHROSCOPY WITH BICEPS TENODESIS, SUBACROMIAL DECOMPRESSION, AND LIMITED DEBRIDEMENT;  Surgeon: Oscar Andrews MD;  Location: [facility];  Service: Orthopedics        reports that she has never smoked. She has never used smokeless tobacco. She reports that she does not drink alcohol and does not use drugs.    No current outpatient medications on file.    Current Facility-Administered Medications:   •  medroxyPROGESTERone (DEPO-PROVERA) IM injection 150 mg, 150 mg, Intramuscular, Q3 Months, PETER Iverson, 150 mg at 22 1122    The following portions of the patient's history were reviewed and updated as appropriate: allergies, current medications, past family history, past medical history, past social history, past surgical history and problem list.    Review of Systems   Constitutional:  Negative for appetite change, chills, fatigue and fever.   Respiratory:  Negative for cough, chest tightness and  "shortness of breath.    Cardiovascular:  Negative for chest pain, palpitations and leg swelling.   Gastrointestinal:  Negative for abdominal pain, constipation, diarrhea, nausea and vomiting.   Genitourinary:  Negative for difficulty urinating and frequency.   Musculoskeletal:  Negative for arthralgias, back pain, gait problem and neck pain.   Skin:  Negative for rash.   Neurological:  Negative for dizziness, weakness, light-headedness, numbness and headaches.   Hematological:  Does not bruise/bleed easily.   Psychiatric/Behavioral:  Negative for dysphoric mood and sleep disturbance. The patient is not nervous/anxious.          PHQ-2/9 Depression Screening    Little interest or pleasure in doing things: 0 - not at all  Feeling down, depressed, or hopeless: 0 - not at all  PHQ-2 Score: 0  PHQ-2 Interpretation: Negative depression screen             Objective:    /80 (BP Location: Left arm, Patient Position: Sitting, Cuff Size: Standard)   Pulse 72   Temp 97.6 °F (36.4 °C) (Tympanic)   Resp 16   Ht 5' 1\" (1.549 m)   Wt 94.3 kg (208 lb)   SpO2 99%   BMI 39.30 kg/m²      Physical Exam  Vitals reviewed.   Constitutional:       General: She is not in acute distress.     Appearance: Normal appearance. She is well-developed. She is obese.   HENT:      Head: Normocephalic.      Right Ear: Tympanic membrane, ear canal and external ear normal.      Left Ear: Tympanic membrane, ear canal and external ear normal.      Nose: Nose normal.      Mouth/Throat:      Pharynx: No oropharyngeal exudate.   Eyes:      General: Lids are normal.      Extraocular Movements: Extraocular movements intact.      Conjunctiva/sclera: Conjunctivae normal.      Pupils: Pupils are equal, round, and reactive to light.   Neck:      Thyroid: No thyromegaly.      Vascular: No carotid bruit.   Cardiovascular:      Rate and Rhythm: Normal rate and regular rhythm.      Pulses: Normal pulses.      Heart sounds: Normal heart sounds. No murmur " heard.     No friction rub.   Pulmonary:      Effort: Pulmonary effort is normal. No respiratory distress.      Breath sounds: Normal breath sounds. No stridor. No wheezing or rales.   Chest:   Breasts:     Breasts are symmetrical.      Right: Normal. No swelling, bleeding, inverted nipple, mass, nipple discharge, skin change or tenderness.      Left: Normal. No swelling, bleeding, inverted nipple, mass, nipple discharge, skin change or tenderness.   Abdominal:      General: Bowel sounds are normal. There is no distension.      Palpations: Abdomen is soft. There is no mass.      Tenderness: There is no abdominal tenderness. There is no guarding.      Hernia: No hernia is present.   Musculoskeletal:         General: Normal range of motion.      Cervical back: Full passive range of motion without pain, normal range of motion and neck supple.   Lymphadenopathy:      Cervical: No cervical adenopathy.   Skin:     General: Skin is warm and dry.      Findings: No rash.      Comments: Nl appearing moles   Neurological:      General: No focal deficit present.      Mental Status: She is alert and oriented to person, place, and time. Mental status is at baseline.      Cranial Nerves: No cranial nerve deficit.      Sensory: No sensory deficit.      Motor: No abnormal muscle tone.      Coordination: Coordination normal.      Gait: Gait normal.      Deep Tendon Reflexes: Reflexes normal. Babinski sign absent on the right side.   Psychiatric:         Mood and Affect: Mood normal.         Speech: Speech normal.         Behavior: Behavior normal.         Thought Content: Thought content normal.         Judgment: Judgment normal.           No results found for this or any previous visit (from the past 8736 hour(s)).    Laboratory Results: I have personally reviewed the pertinent laboratory results/reports     Radiology/Other Diagnostic Testing Results: I have personally reviewed pertinent reports.      Mammo screening bilateral w 3d  & cad    Result Date: 1/25/2023  DIAGNOSIS: Encounter for screening mammogram for malignant neoplasm of breast TECHNIQUE: Digital screening mammography was performed. Computer Aided Detection (CAD) analyzed all applicable images. COMPARISONS: Prior breast imaging dated: 06/15/2021, 10/07/2019, and 07/26/2019 RELEVANT HISTORY: Family Breast Cancer History: History of breast cancer in Neg Hx. Family Medical History: No known relevant family medical history. Personal History: Hormone history includes other. No known relevant surgical history. No known relevant medical history. The patient is scheduled in a reminder system for screening mammography. 8-10% of cancers will be missed on mammography. Management of a palpable abnormality must be based on clinical grounds.  Patients will be notified of their results via letter from our facility. Accredited by American College [employer] and FDA. RISK ASSESSMENT: 5 Year Tyrer-Cuzick: 0.8 % 10 Year Tyrer-Cuzick: 1.7 % Lifetime Tyrer-Cuzick: 8.12 % TISSUE DENSITY: There are scattered areas of fibroglandular density.  INDICATION: Frances Newton is a 47 y.o. female presenting for screening mammography. FINDINGS: There are no suspicious masses, grouped microcalcifications or areas of architectural distortion. The skin and nipple areolar complex are unremarkable.     No mammographic evidence of malignancy. No significant change ASSESSMENT/BI-RADS CATEGORY: Left: 1 - Negative Right: 1 - Negative Overall: 1 - Negative RECOMMENDATION:      - Routine screening mammogram in 1 year for both breasts. Workstation ID: [id number]        Assessment/Plan:  1. Annual physical exam  Assessment & Plan:  Check routine labs    Orders:  -     CBC and differential; Future  -     Comprehensive metabolic panel; Future; Expected date: 07/19/2024  -     Lipid panel; Future; Expected date: 07/19/2024  -     TSH, 3rd generation; Future  -     Urinalysis with microscopic  2. Need for hepatitis C  "screening test  -     Hepatitis C Antibody; Future  3. Colon cancer screening  -     Ambulatory Referral to Gastroenterology; Future  4. Breast cancer screening by mammogram  -     Mammo screening bilateral w 3d & cad; Future  5. Obesity (BMI 30-39.9)  Assessment & Plan:  Discussion on diet and exercise guidelines for weight loss and  health reviewed with pt     6. History of gestational diabetes  Assessment & Plan:  Check hga1c  7. Encounter for annual routine gynecological examination  -     Ambulatory Referral to Obstetrics / Gynecology; Future                 Read package inserts for all medications before starting a new medications, call me if you have any questions.    Patient was given opportunity to ask questions and all questions were answered.    Disclaimer: Portions of the record may have been created with voice recognition software. Occasional wrong word or \"sound a like\" substitutions may have occurred due to the inherent limitations of voice recognition software. Read the chart carefully and recognize, using context, where substitutions have occurred. I have used the Epic copy/forward function to compose this note. I have reviewed my current note to ensure it reflects the current patient status, exam, assessment and plan.  "

## 2024-07-23 ENCOUNTER — PREP FOR PROCEDURE (OUTPATIENT)
Dept: GASTROENTEROLOGY | Facility: CLINIC | Age: 49
End: 2024-07-23

## 2024-07-23 ENCOUNTER — TELEPHONE (OUTPATIENT)
Dept: GASTROENTEROLOGY | Facility: CLINIC | Age: 49
End: 2024-07-23

## 2024-07-23 DIAGNOSIS — Z12.11 SCREENING FOR COLON CANCER: Primary | ICD-10-CM

## 2024-07-23 NOTE — TELEPHONE ENCOUNTER
Scheduled date of colonoscopy (as of today): 08/02/24  Physician performing colonoscopy: darleen  Location of colonoscopy: [facility]  Bowel prep reviewed with patient: m/d  Instructions reviewed with patient by: emailed  Clearances: none

## 2024-07-23 NOTE — TELEPHONE ENCOUNTER
07/23/24  Screened by: Esther Person MA    Referring Provider OA    Pre- Screening:     There is no height or weight on file to calculate BMI.  Has patient been referred for a routine screening Colonoscopy? yes  Is the patient between 45-75 years old? yes      Previous Colonoscopy no   If yes:    Date:     Facility:     Reason:       SCHEDULING STAFF: If the patient is between 45yrs-49yrs, please advise patient to confirm benefits/coverage with their insurance company for a routine screening colonoscopy, some insurance carriers will only cover at 50yrs or older. If the patient is over 75years old, please schedule an office visit.     Does the patient want to see a Gastroenterologist prior to their procedure OR are they having any GI symptoms?     Has the patient been hospitalized or had abdominal surgery in the past 6 months? no    Does the patient use supplemental oxygen? no    Does the patient take Coumadin, Lovenox, Plavix, Elliquis, Xarelto, or other blood thinning medication? no    Has the patient had a stroke, cardiac event, or stent placed in the past year? no    SCHEDULING STAFF: If patient answers NO to above questions, then schedule procedure. If patient answers YES to above questions, then schedule office appointment.     If patient is between 45yrs - 49yrs, please advise patient that we will have to confirm benefits & coverage with their insurance company for a routine screening colonoscopy.

## 2024-07-30 DIAGNOSIS — E78.00 ELEVATED CHOLESTEROL: Primary | ICD-10-CM

## 2025-01-23 ENCOUNTER — TELEPHONE (OUTPATIENT)
Age: 50
End: 2025-01-23

## 2025-01-23 NOTE — LETTER
Tesha Daniels,    Attached are your instructions for your upcoming procedure on. If you have any questions, please give us a call at [phone].    Thank you,     [facility]

## 2025-01-23 NOTE — TELEPHONE ENCOUNTER
Scheduled date of colonoscopy (as of today): 03/017/25  Physician performing colonoscopy: Dr. Branham  Location of colonoscopy: [facility]  Bowel prep reviewed with patient: Jovanni & email : [email or web address]  Instructions reviewed with patient by:Hayley  Clearances:

## 2025-01-23 NOTE — TELEPHONE ENCOUNTER
01/23/25  Screened by: Heather Paige    Referring Provider: Dr. Johnson    Pre- Screening:     There is no height or weight on file to calculate BMI.  Has patient been referred for a routine screening Colonoscopy? yes  Is the patient between 45-75 years old? yes      Previous Colonoscopy no   If yes:    Date:     Facility:     Reason:         Does the patient want to see a Gastroenterologist prior to their procedure OR are they having any GI symptoms? no    Has the patient been hospitalized or had abdominal surgery in the past 6 months? no    Does the patient use supplemental oxygen? no    Does the patient take Coumadin, Lovenox, Plavix, Elliquis, Xarelto, or other blood thinning medication? no    Has the patient had a stroke, cardiac event, or stent placed in the past year? no    If patient is between 45yrs - 49yrs, please advise patient that we will have to confirm benefits & coverage with their insurance company for a routine screening colonoscopy.

## 2025-02-19 NOTE — PROGRESS NOTES
Annual Exam Pre-charting    Last Annual Exam: 22    Last PAP/HPV Test and Result: 19    Last Mammo Screenin23    Last STD Culture Screening:    Current BC Method:

## 2025-02-21 ENCOUNTER — OFFICE VISIT (OUTPATIENT)
Dept: OBGYN CLINIC | Facility: CLINIC | Age: 50
End: 2025-02-21
Payer: COMMERCIAL

## 2025-02-21 VITALS
SYSTOLIC BLOOD PRESSURE: 112 MMHG | DIASTOLIC BLOOD PRESSURE: 74 MMHG | HEIGHT: 61 IN | WEIGHT: 180 LBS | BODY MASS INDEX: 33.99 KG/M2

## 2025-02-21 DIAGNOSIS — Z01.419 ENCOUNTER FOR GYNECOLOGICAL EXAMINATION (GENERAL) (ROUTINE) WITHOUT ABNORMAL FINDINGS: Primary | ICD-10-CM

## 2025-02-21 DIAGNOSIS — Z12.4 SCREENING FOR CERVICAL CANCER: ICD-10-CM

## 2025-02-21 PROBLEM — Z30.42 ENCOUNTER FOR SURVEILLANCE OF INJECTABLE CONTRACEPTIVE: Status: RESOLVED | Noted: 2020-07-09 | Resolved: 2025-02-21

## 2025-02-21 PROBLEM — Z30.42 ENCOUNTER FOR DEPO-PROVERA CONTRACEPTION: Status: RESOLVED | Noted: 2019-06-24 | Resolved: 2025-02-21

## 2025-02-21 PROCEDURE — G0476 HPV COMBO ASSAY CA SCREEN: HCPCS | Performed by: PHYSICIAN ASSISTANT

## 2025-02-21 PROCEDURE — G0145 SCR C/V CYTO,THINLAYER,RESCR: HCPCS | Performed by: PHYSICIAN ASSISTANT

## 2025-02-21 PROCEDURE — 99386 PREV VISIT NEW AGE 40-64: CPT | Performed by: PHYSICIAN ASSISTANT

## 2025-02-21 NOTE — PROGRESS NOTES
ASSESSMENT & PLAN: Frances Newton is a 49 y.o.  with normal gynecologic exam.    [employer]  USED FOR VISIT TODAY    1.  Routine well woman exam done today  2.  Pap and HPV:  The patient's last pap and hpv was .    It was normal.    Pap and cotesting was done today.    Current ASCCP Guidelines reviewed.   3.  Mammogram ordered - scheduled may 2  4.  Colonoscopy scheduled for 3/7  5. The following were reviewed in today's visit: breast self exam, menopause, adequate intake of calcium and vitamin D, exercise, healthy diet, and age appropriate recommendations regarding screenings and prevention.  6. Pt has no specific questions or concerns today but mentioned vaginal dryness. She denies it being bothersome or causing her significant problems, no vaginal itching/irritation or dyspareunia. States she just wanted to mention it. Discussed options for management using vit E oil or coconut oil as moisturizer, as well as lubricant during intercourse.      RTO 1 year annual exam, sooner if problems arise in the interim.         CC:  Annual Gynecologic Examination    HPI: Frances Newton is a 49 y.o.  who presents for annual gynecologic examination.    She is a new patient with our office.  Last pap , last GYN exam with [facility]     She has the following concerns:  none.   Occasional vaginal dryness - not bothersome for her.     Healthy diet Yes; she eats well balanced, well balanced diet.  She reports she has lost weight intentionally w/ diet/exercise  Vitamins No  Exercise No    Patient's last menstrual period was 2025.    Menses frequency: states periods are regular q 28 days  Length of bleeding: 3-4 days  Bleeding quality: she reports bleeding is average.  Pain with menses:  she gets mild cramping occasionally with menstruation.     She does not have any vaginal or urinary concerns  today  Bowel or bladder changes: denies.      Health Maintenance:      She does perform  regular monthly self breast exams.  Denies breast pain, lump, skin change or nipple discharge.      Last Pap: 2019  Last mammogram: 2023  Last colonoscopy: ?    Past Medical History:   Diagnosis Date    Encounter for Depo-Provera contraception 2019    Gestational diabetes        Past Surgical History:   Procedure Laterality Date     SECTION  1997     SECTION  2016    AL SURGICAL ARTHROSCOPY SHOULDER W/ROTATOR CUFF RPR Right 1/10/2022    Procedure: DIAGNOSTIC ARTHROSCOPY WITH BICEPS TENODESIS, SUBACROMIAL DECOMPRESSION, AND LIMITED DEBRIDEMENT;  Surgeon: Oscar Andrews MD;  Location: [facility];  Service: Orthopedics       Past OB/Gyn History:  OB History          2    Para   2    Term   2            AB        Living   2         SAB        IAB        Ectopic        Multiple        Live Births   2               Pt does not have menstrual issues.    History of sexually transmitted infection: denies.  History of abnormal pap smears: denies.    Patient is currently sexually active.  Monogamous with    The current method of family planning is condoms.    Family History   Problem Relation Age of Onset    No Known Problems Mother     Lung cancer Father 58        smoker    No Known Problems Sister     No Known Problems Brother     No Known Problems Brother     No Known Problems Brother     No Known Problems Brother     No Known Problems Brother     No Known Problems Daughter     No Known Problems Daughter     Stomach cancer Maternal Grandmother     No Known Problems Maternal Grandfather     Stomach cancer Paternal Grandmother     No Known Problems Paternal Grandfather     Breast cancer Neg Hx     Colon cancer Neg Hx     Ovarian cancer Neg Hx        Family history of Breast/Uterine/Ovarian/Colon Cancer: denies    Social History:  Social History     Socioeconomic History    Marital status:      Spouse name: Not on file    Number of children: 2    Years of  education: Not on file    Highest education level: Not on file   Occupational History    Not on file   Tobacco Use    Smoking status: Never    Smokeless tobacco: Never   Vaping Use    Vaping status: Never Used   Substance and Sexual Activity    Alcohol use: No    Drug use: No    Sexual activity: Yes     Partners: Male   Other Topics Concern    Not on file   Social History Narrative    Not on file     Social Drivers of Health     Financial Resource Strain: Low Risk  (8/1/2022)    Overall Financial Resource Strain (CARDIA)     Difficulty of Paying Living Expenses: Not hard at all   Food Insecurity: No Food Insecurity (8/1/2022)    Hunger Vital Sign     Worried About Running Out of Food in the Last Year: Never true     Ran Out of Food in the Last Year: Never true   Transportation Needs: No Transportation Needs (8/1/2022)    PRAPARE - Transportation     Lack of Transportation (Medical): No     Lack of Transportation (Non-Medical): No   Physical Activity: Sufficiently Active (8/1/2022)    Exercise Vital Sign     Days of Exercise per Week: 3 days     Minutes of Exercise per Session: 60 min   Stress: No Stress Concern Present (8/1/2022)    [employer] [place] [employer] - Occupational Stress Questionnaire     Feeling of Stress : Not at all   Social Connections: Moderately Isolated (8/1/2022)    Social Connection and Isolation Panel [NHANES]     Frequency of Communication with Friends and Family: More than three times a week     Frequency of Social Gatherings with Friends and Family: More than three times a week     Attends Baptism Services: More than 4 times per year     Active Member of Clubs or Organizations: No     Attends Club or Organization Meetings: Never     Marital Status:    Intimate Partner Violence: Not At Risk (8/1/2022)    Humiliation, Afraid, Rape, and Kick questionnaire     Fear of Current or Ex-Partner: No     Emotionally Abused: No     Physically Abused: No     Sexually Abused: No  "  Housing Stability: Low Risk  (8/1/2022)    Housing Stability Vital Sign     Unable to Pay for Housing in the Last Year: No     Number of Places Lived in the Last Year: 1     Unstable Housing in the Last Year: No       No Known Allergies  No current outpatient medications on file.    Current Facility-Administered Medications:     medroxyPROGESTERone (DEPO-PROVERA) IM injection 150 mg, 150 mg, Intramuscular, Q3 Months, Constanza BenavidezANNABELLANP, 150 mg at 12/23/22 1122      Review of Systems  Constitutional :no fever, feels well, no tiredness, no recent weight gain or loss  ENT: no ear ache, no loss of hearing, no nosebleeds or nasal discharge, no sore throat or hoarseness.  Cardiovascular: no complaints of slow or fast heart beat, no chest pain, no palpitations, no leg claudication or lower extremity edema.  Respiratory: no complaints of shortness of shortness of breath, no VENCES  Breasts:no complaints of breast pain, breast lump, or nipple discharge  Gastrointestinal: no complaints of abdominal pain, constipation, nausea, vomiting, or diarrhea or bloody stools  Genitourinary : no complaints of dysuria, incontinence, pelvic pain, no dysmenorrhea, vaginal discharge/itching/odor or abnormal vaginal bleeding and as noted in HPI.  Musculoskeletal: no complaints of arthralgia, no myalgia, no joint swelling or stiffness, no limb pain or swelling.  Integumentary: no complaints of skin rash or lesion, itching or dry skin  Neurological: no complaints of headache, no confusion, no numbness or tingling, no dizziness or fainting.  MH: denies anxiety/depression sxs    Objective      /74 (BP Location: Left arm, Patient Position: Sitting, Cuff Size: Adult)   Ht 5' 1\" (1.549 m)   Wt 81.6 kg (180 lb)   LMP 01/29/2025   BMI 34.01 kg/m²     General:   appears stated age, cooperative, alert normal mood and affect.  BMI 34   Neck: normal, supple,trachea midline, no masses.  There are palpated normal.   Heart: regular rate and " rhythm, S1, S2 normal, no murmur, click, rub or gallop   Lungs: clear to auscultation bilaterally   Breasts: normal appearance, no masses or tenderness, No nipple retraction or dimpling, No nipple discharge or bleeding, No axillary or supraclavicular adenopathy, Normal to palpation without dominant masses   Abdomen: soft, non-tender, without masses or organomegaly   Vulva: normal female genitalia, no lesions   Vagina: normal vagina, no discharge, exudate, lesion, or erythema   Urethra: normal   Cervix: Normal, no discharge. PAP done. Nontender.   Uterus: normal size, contour, position, consistency, mobility, non-tender   Adnexa: no mass, fullness, tenderness   Lymphatic palpation of lymph nodes in neck, axilla, groin and/or other locations: no lymphadenopathy or masses noted   Skin normal skin turgor and no rashes.   Psychiatric orientation to person, place, and time: normal. mood and affect: normal

## 2025-02-24 ENCOUNTER — TELEPHONE (OUTPATIENT)
Dept: PREADMISSION TESTING | Facility: HOSPITAL | Age: 50
End: 2025-02-24

## 2025-02-24 VITALS — BODY MASS INDEX: 33.99 KG/M2 | HEIGHT: 61 IN | WEIGHT: 180 LBS

## 2025-02-24 NOTE — PRE-PROCEDURE INSTRUCTIONS
No outpatient medications have been marked as taking for the 2/24/25 encounter (Telephone) with Nancy Mcknight LPN.    Medication instructions for procedure reviewed. Please use only a sip of water to take your instructed medications 2 hours prior to arrival. Avoid Iron 1 week prior to your colonoscopies. If you use rescue inhalers, please bring on day of procedure. Hold All vitamins/supplements day of procedure.     You will receive a call one business day prior to your procedure with an arrival time and hospital directions. If your procedure is scheduled on a Monday, the hospital will be calling you on the Friday prior to your procedure.  If you have not heard from anyone by 8pm, please call the GI office # [phone] .(Uriel [phone]).    Follow bowel prep instructions exactly as written to ensure effectiveness.       Arrange for a responsible person over the age of 18 to drive you to and from the hospital on the day of your procedure. Please confirm the visitor policy for the day of your procedure when you receive your phone call with an arrival time.     Finally, call the GI office at [phone] with any new illnesses, exposures, or additional questions prior to your procedure. If you need to reschedule for a non medical reason, please do so NO LATER than 3 days prior to your procedure.

## 2025-02-27 ENCOUNTER — RESULTS FOLLOW-UP (OUTPATIENT)
Dept: OBGYN CLINIC | Facility: CLINIC | Age: 50
End: 2025-02-27

## 2025-02-27 LAB
LAB AP GYN PRIMARY INTERPRETATION: NORMAL
Lab: NORMAL

## 2025-02-28 ENCOUNTER — TELEPHONE (OUTPATIENT)
Age: 50
End: 2025-02-28

## 2025-03-06 NOTE — TELEPHONE ENCOUNTER
[id number]  Pt calling for proced time, advised GI lab will call between 2-6PM. Pt confirmed understanding. Asked questions about prep

## 2025-03-07 ENCOUNTER — ANESTHESIA (OUTPATIENT)
Dept: GASTROENTEROLOGY | Facility: HOSPITAL | Age: 50
End: 2025-03-07
Payer: COMMERCIAL

## 2025-03-07 ENCOUNTER — HOSPITAL ENCOUNTER (OUTPATIENT)
Dept: GASTROENTEROLOGY | Facility: HOSPITAL | Age: 50
Setting detail: OUTPATIENT SURGERY
End: 2025-03-07
Attending: COLON & RECTAL SURGERY
Payer: COMMERCIAL

## 2025-03-07 ENCOUNTER — ANESTHESIA EVENT (OUTPATIENT)
Dept: GASTROENTEROLOGY | Facility: HOSPITAL | Age: 50
End: 2025-03-07
Payer: COMMERCIAL

## 2025-03-07 VITALS
DIASTOLIC BLOOD PRESSURE: 69 MMHG | BODY MASS INDEX: 33.42 KG/M2 | TEMPERATURE: 98.8 F | HEART RATE: 74 BPM | HEIGHT: 61 IN | RESPIRATION RATE: 16 BRPM | WEIGHT: 177 LBS | OXYGEN SATURATION: 98 % | SYSTOLIC BLOOD PRESSURE: 114 MMHG

## 2025-03-07 DIAGNOSIS — Z12.11 SCREENING FOR COLON CANCER: ICD-10-CM

## 2025-03-07 PROCEDURE — 88305 TISSUE EXAM BY PATHOLOGIST: CPT | Performed by: PATHOLOGY

## 2025-03-07 PROCEDURE — 45385 COLONOSCOPY W/LESION REMOVAL: CPT | Performed by: INTERNAL MEDICINE

## 2025-03-07 PROCEDURE — 45380 COLONOSCOPY AND BIOPSY: CPT | Performed by: INTERNAL MEDICINE

## 2025-03-07 RX ORDER — LIDOCAINE HYDROCHLORIDE 10 MG/ML
INJECTION, SOLUTION EPIDURAL; INFILTRATION; INTRACAUDAL; PERINEURAL AS NEEDED
Status: DISCONTINUED | OUTPATIENT
Start: 2025-03-07 | End: 2025-03-07

## 2025-03-07 RX ORDER — SODIUM CHLORIDE, SODIUM LACTATE, POTASSIUM CHLORIDE, CALCIUM CHLORIDE 600; 310; 30; 20 MG/100ML; MG/100ML; MG/100ML; MG/100ML
INJECTION, SOLUTION INTRAVENOUS CONTINUOUS PRN
Status: DISCONTINUED | OUTPATIENT
Start: 2025-03-07 | End: 2025-03-07

## 2025-03-07 RX ORDER — PROPOFOL 10 MG/ML
INJECTION, EMULSION INTRAVENOUS AS NEEDED
Status: DISCONTINUED | OUTPATIENT
Start: 2025-03-07 | End: 2025-03-07

## 2025-03-07 RX ADMIN — PROPOFOL 40 MG: 10 INJECTION, EMULSION INTRAVENOUS at 11:41

## 2025-03-07 RX ADMIN — LIDOCAINE HYDROCHLORIDE 50 MG: 10 INJECTION, SOLUTION EPIDURAL; INFILTRATION; INTRACAUDAL at 11:37

## 2025-03-07 RX ADMIN — PROPOFOL 50 MG: 10 INJECTION, EMULSION INTRAVENOUS at 11:56

## 2025-03-07 RX ADMIN — PROPOFOL 40 MG: 10 INJECTION, EMULSION INTRAVENOUS at 11:39

## 2025-03-07 RX ADMIN — PROPOFOL 50 MG: 10 INJECTION, EMULSION INTRAVENOUS at 11:59

## 2025-03-07 RX ADMIN — PROPOFOL 50 MG: 10 INJECTION, EMULSION INTRAVENOUS at 11:52

## 2025-03-07 RX ADMIN — PROPOFOL 50 MG: 10 INJECTION, EMULSION INTRAVENOUS at 11:47

## 2025-03-07 RX ADMIN — PROPOFOL 50 MG: 10 INJECTION, EMULSION INTRAVENOUS at 11:43

## 2025-03-07 RX ADMIN — PROPOFOL 50 MG: 10 INJECTION, EMULSION INTRAVENOUS at 11:49

## 2025-03-07 RX ADMIN — PROPOFOL 50 MG: 10 INJECTION, EMULSION INTRAVENOUS at 11:50

## 2025-03-07 RX ADMIN — PROPOFOL 50 MG: 10 INJECTION, EMULSION INTRAVENOUS at 11:45

## 2025-03-07 RX ADMIN — SODIUM CHLORIDE, SODIUM LACTATE, POTASSIUM CHLORIDE, AND CALCIUM CHLORIDE: .6; .31; .03; .02 INJECTION, SOLUTION INTRAVENOUS at 10:58

## 2025-03-07 RX ADMIN — PROPOFOL 50 MG: 10 INJECTION, EMULSION INTRAVENOUS at 12:03

## 2025-03-07 RX ADMIN — PROPOFOL 50 MG: 10 INJECTION, EMULSION INTRAVENOUS at 11:54

## 2025-03-07 RX ADMIN — PROPOFOL 120 MG: 10 INJECTION, EMULSION INTRAVENOUS at 11:37

## 2025-03-07 NOTE — ANESTHESIA POSTPROCEDURE EVALUATION
Post-Op Assessment Note    CV Status:  Stable  Pain Score: 0    Pain management: adequate       Mental Status:  Sleepy and arousable   Hydration Status:  Euvolemic   PONV Controlled:  Controlled   Airway Patency:  Patent     Post Op Vitals Reviewed: Yes    No anethesia notable event occurred.    Staff: CRNA           Last Filed PACU Vitals:  Vitals Value Taken Time   Temp 98.8 °F (37.1 °C) 03/07/25 1208   Pulse 79 03/07/25 1208   BP 96/55 03/07/25 1208   Resp 16 03/07/25 1208   SpO2 97 % 03/07/25 1208

## 2025-03-07 NOTE — H&P
History and Physical - [facility]  Frances Newton 49 y.o. female MRN: [id number]                  HPI: Frances Newton is a 49 y.o. year old female who presents for colon cancer screening      REVIEW OF SYSTEMS: Per the HPI, and otherwise unremarkable.    Historical Information   Past Medical History:   Diagnosis Date    Encounter for Depo-Provera contraception 2019    Gestational diabetes      Past Surgical History:   Procedure Laterality Date     SECTION  1997     SECTION  2016    MT SURGICAL ARTHROSCOPY SHOULDER W/ROTATOR CUFF RPR Right 1/10/2022    Procedure: DIAGNOSTIC ARTHROSCOPY WITH BICEPS TENODESIS, SUBACROMIAL DECOMPRESSION, AND LIMITED DEBRIDEMENT;  Surgeon: Oscar Andrews MD;  Location: [facility] OR;  Service: Orthopedics     Social History   Social History     Substance and Sexual Activity   Alcohol Use No     Social History     Substance and Sexual Activity   Drug Use No     Social History     Tobacco Use   Smoking Status Never   Smokeless Tobacco Never     Family History   Problem Relation Age of Onset    No Known Problems Mother     Lung cancer Father 58        smoker    No Known Problems Sister     No Known Problems Brother     No Known Problems Brother     No Known Problems Brother     No Known Problems Brother     No Known Problems Brother     No Known Problems Daughter     No Known Problems Daughter     Stomach cancer Maternal Grandmother     No Known Problems Maternal Grandfather     Stomach cancer Paternal Grandmother     No Known Problems Paternal Grandfather     Breast cancer Neg Hx     Colon cancer Neg Hx     Ovarian cancer Neg Hx        Meds/Allergies     No current outpatient medications on file.    Current Facility-Administered Medications:     medroxyPROGESTERone (DEPO-PROVERA) IM injection 150 mg, 150 mg, Intramuscular, Q3 Months, 150 mg at 22 1122    Facility-Administered Medications Ordered in Other Encounters:      "lactated ringers infusion, , Intravenous, Continuous PRN, New Bag at 03/07/25 1058    No Known Allergies    Objective     /60   Pulse 70   Temp 97.5 °F (36.4 °C) (Temporal)   Resp 16   Ht 5' 1\" (1.549 m)   Wt 80.3 kg (177 lb)   LMP 02/02/2024   SpO2 96%   BMI 33.44 kg/m²       PHYSICAL EXAM    Gen: NAD  Head: NCAT  CV: RRR  CHEST: Clear  ABD: soft, NT/ND  EXT: no edema      ASSESSMENT/PLAN:  This is a 49 y.o. year old female here for colonoscopy, and she is stable and optimized for her procedure.        "

## 2025-03-07 NOTE — ANESTHESIA PREPROCEDURE EVALUATION
Procedure:  COLONOSCOPY    Relevant Problems   No relevant active problems        Physical Exam    Airway    Mallampati score: II  TM Distance: >3 FB  Neck ROM: full     Dental    upper dentures    Cardiovascular      Pulmonary      Other Findings  post-pubertal.      Anesthesia Plan  ASA Score- 2     Anesthesia Type- IV sedation with anesthesia with ASA Monitors.         Additional Monitors:     Airway Plan:            Plan Factors-Exercise tolerance (METS): >4 METS.    Chart reviewed.    Patient summary reviewed.    Patient is not a current smoker.      There is medical exclusion for perioperative obstructive sleep apnea risk education.        Induction- intravenous.    Postoperative Plan-         Informed Consent- Anesthetic plan and risks discussed with patient.  I personally reviewed this patient with the CRNA. Discussed and agreed on the Anesthesia Plan with the CRNA..      NPO Status:  Vitals Value Taken Time   Date of last liquid 03/07/25 03/07/25 1109   Time of last liquid 0700 03/07/25 1109   Date of last solid 03/07/25 03/07/25 1109   Time of last solid 2100 03/07/25 1109

## 2025-03-07 NOTE — PERIOPERATIVE NURSING NOTE
Pt &  expressing understanding to discharge instructions utilizing ipad  # [id number]. No complaints of pain. IV site removed. Pt getting dressed. Pt tolerating snack uneventfully.

## 2025-03-11 LAB
CHOLEST SERPL-MCNC: 195 MG/DL
CHOLEST/HDLC SERPL: 3.8 (CALC)
HDLC SERPL-MCNC: 51 MG/DL
LDLC SERPL CALC-MCNC: 112 MG/DL (CALC)
NONHDLC SERPL-MCNC: 144 MG/DL (CALC)
TRIGL SERPL-MCNC: 208 MG/DL

## 2025-03-11 PROCEDURE — 88305 TISSUE EXAM BY PATHOLOGIST: CPT | Performed by: PATHOLOGY

## 2025-04-08 ENCOUNTER — RESULTS FOLLOW-UP (OUTPATIENT)
Age: 50
End: 2025-04-08

## 2025-04-08 NOTE — RESULT ENCOUNTER NOTE
Please call the patient regarding results.  Colon polyps were all benign.  Repeat colonoscopy in 3 years

## 2025-05-02 ENCOUNTER — HOSPITAL ENCOUNTER (OUTPATIENT)
Dept: MAMMOGRAPHY | Facility: HOSPITAL | Age: 50
Discharge: HOME | End: 2025-05-02
Attending: FAMILY MEDICINE
Payer: COMMERCIAL

## 2025-05-02 VITALS — BODY MASS INDEX: 33.42 KG/M2 | WEIGHT: 177 LBS | HEIGHT: 61 IN

## 2025-05-02 DIAGNOSIS — Z12.31 BREAST CANCER SCREENING BY MAMMOGRAM: ICD-10-CM

## 2025-05-02 PROCEDURE — 77063 BREAST TOMOSYNTHESIS BI: CPT

## 2025-05-02 PROCEDURE — 77067 SCR MAMMO BI INCL CAD: CPT

## 2025-05-04 ENCOUNTER — RESULTS FOLLOW-UP (OUTPATIENT)
Dept: FAMILY MEDICINE CLINIC | Facility: CLINIC | Age: 50
End: 2025-05-04

## 2025-05-04 DIAGNOSIS — R92.8 ABNORMAL MAMMOGRAM: Primary | ICD-10-CM

## 2025-05-07 NOTE — TELEPHONE ENCOUNTER
Pt returned call,  contacted for patient to relay message in chart in regards to mammogram.    Patient, through , verbalized understanding and had no further questions.

## 2025-07-18 ENCOUNTER — HOSPITAL ENCOUNTER (OUTPATIENT)
Dept: MAMMOGRAPHY | Facility: CLINIC | Age: 50
Discharge: HOME | End: 2025-07-18
Attending: FAMILY MEDICINE
Payer: COMMERCIAL

## 2025-07-18 ENCOUNTER — HOSPITAL ENCOUNTER (OUTPATIENT)
Dept: ULTRASOUND IMAGING | Facility: CLINIC | Age: 50
Discharge: HOME | End: 2025-07-18
Attending: FAMILY MEDICINE
Payer: COMMERCIAL

## 2025-07-18 DIAGNOSIS — R92.8 ABNORMAL MAMMOGRAM: ICD-10-CM

## 2025-07-18 PROCEDURE — 77065 DX MAMMO INCL CAD UNI: CPT

## 2025-07-18 PROCEDURE — 76642 ULTRASOUND BREAST LIMITED: CPT

## 2025-07-18 PROCEDURE — G0279 TOMOSYNTHESIS, MAMMO: HCPCS

## 2025-07-22 NOTE — PROGRESS NOTES
"Name: Frances Newton      : 1975      MRN: [id number]  Encounter Provider: Carina Bond MD  Encounter Date: 2025   Encounter department: [facility]  :  Assessment & Plan  Annual physical exam  Check routine labs      Orders:    CBC and differential; Future    Comprehensive metabolic panel; Future    TSH, 3rd generation; Future    Urinalysis with microscopic    Hemoglobin A1C; Future    Lipid panel; Future    Need for hepatitis C screening test    Orders:    Hepatitis C Antibody; Future    Obesity (BMI 30-39.9)  Discussion on diet and exercise guidelines for weight loss and  health reviewed with pt            Encounter for immunization    Orders:    Zoster Vaccine Recombinant IM           History of Present Illness   Patient here for annual physical        Review of Systems   Constitutional:  Negative for appetite change, chills, fatigue and fever.   Respiratory:  Negative for cough, chest tightness and shortness of breath.    Cardiovascular:  Negative for chest pain, palpitations and leg swelling.   Gastrointestinal:  Negative for abdominal pain, constipation, diarrhea, nausea and vomiting.   Genitourinary:  Negative for difficulty urinating and frequency.   Musculoskeletal:  Negative for arthralgias, back pain, gait problem and neck pain.   Skin:  Negative for rash.   Neurological:  Negative for dizziness, weakness, light-headedness, numbness and headaches.   Hematological:  Does not bruise/bleed easily.   Psychiatric/Behavioral:  Negative for dysphoric mood and sleep disturbance. The patient is not nervous/anxious.        Objective   /78 (BP Location: Left arm, Patient Position: Sitting, Cuff Size: Standard)   Pulse 64   Temp 98.4 °F (36.9 °C) (Tympanic)   Resp 16   Ht 5' 1\" (1.549 m)   Wt 83 kg (183 lb)   LMP 2024   SpO2 98%   BMI 34.58 kg/m²      Physical Exam  Vitals and nursing note reviewed.   Constitutional:       General: She is not in " acute distress.     Appearance: Normal appearance. She is well-developed. She is obese.   HENT:      Head: Normocephalic and atraumatic.      Right Ear: Tympanic membrane, ear canal and external ear normal.      Left Ear: Tympanic membrane, ear canal and external ear normal.      Nose: Nose normal.      Mouth/Throat:      Mouth: Mucous membranes are moist.      Pharynx: Oropharynx is clear. No oropharyngeal exudate or posterior oropharyngeal erythema.     Eyes:      Extraocular Movements: Extraocular movements intact.      Conjunctiva/sclera: Conjunctivae normal.      Pupils: Pupils are equal, round, and reactive to light.       Cardiovascular:      Rate and Rhythm: Normal rate and regular rhythm.      Pulses: Normal pulses.      Heart sounds: Normal heart sounds. No murmur heard.  Pulmonary:      Effort: Pulmonary effort is normal. No respiratory distress.      Breath sounds: Normal breath sounds. No wheezing or rales.   Abdominal:      General: Bowel sounds are normal. There is no distension.      Palpations: Abdomen is soft. There is no mass.      Tenderness: There is no abdominal tenderness. There is no right CVA tenderness, left CVA tenderness, guarding or rebound.      Hernia: No hernia is present.     Musculoskeletal:         General: No swelling or tenderness. Normal range of motion.      Cervical back: Normal range of motion and neck supple.      Right lower leg: No edema.      Left lower leg: No edema.     Skin:     General: Skin is warm and dry.      Capillary Refill: Capillary refill takes less than 2 seconds.      Findings: No rash.      Comments: No abn  moles seen     Neurological:      General: No focal deficit present.      Mental Status: She is alert and oriented to person, place, and time.      Cranial Nerves: No cranial nerve deficit.      Sensory: No sensory deficit.      Motor: No weakness.      Coordination: Coordination normal.      Gait: Gait normal.      Deep Tendon Reflexes: Reflexes  normal.     Psychiatric:         Mood and Affect: Mood normal.         Behavior: Behavior normal.

## 2025-07-22 NOTE — ASSESSMENT & PLAN NOTE
Check routine labs      Orders:    CBC and differential; Future    Comprehensive metabolic panel; Future    TSH, 3rd generation; Future    Urinalysis with microscopic    Hemoglobin A1C; Future    Lipid panel; Future

## 2025-07-25 ENCOUNTER — OFFICE VISIT (OUTPATIENT)
Dept: FAMILY MEDICINE CLINIC | Facility: CLINIC | Age: 50
End: 2025-07-25
Payer: COMMERCIAL

## 2025-07-25 VITALS
WEIGHT: 183 LBS | RESPIRATION RATE: 16 BRPM | BODY MASS INDEX: 34.55 KG/M2 | DIASTOLIC BLOOD PRESSURE: 78 MMHG | SYSTOLIC BLOOD PRESSURE: 120 MMHG | HEIGHT: 61 IN | HEART RATE: 64 BPM | TEMPERATURE: 98.4 F | OXYGEN SATURATION: 98 %

## 2025-07-25 DIAGNOSIS — Z00.00 ANNUAL PHYSICAL EXAM: Primary | ICD-10-CM

## 2025-07-25 DIAGNOSIS — Z23 ENCOUNTER FOR IMMUNIZATION: ICD-10-CM

## 2025-07-25 DIAGNOSIS — E66.9 OBESITY (BMI 30-39.9): ICD-10-CM

## 2025-07-25 DIAGNOSIS — Z11.59 NEED FOR HEPATITIS C SCREENING TEST: ICD-10-CM

## 2025-07-25 PROCEDURE — 90471 IMMUNIZATION ADMIN: CPT | Performed by: FAMILY MEDICINE

## 2025-07-25 PROCEDURE — 99396 PREV VISIT EST AGE 40-64: CPT | Performed by: FAMILY MEDICINE

## 2025-07-25 PROCEDURE — 90750 HZV VACC RECOMBINANT IM: CPT | Performed by: FAMILY MEDICINE

## 2025-07-26 LAB
ALBUMIN SERPL-MCNC: 4.1 G/DL (ref 3.6–5.1)
ALBUMIN/GLOB SERPL: 1.6 (CALC) (ref 1–2.5)
ALP SERPL-CCNC: 64 U/L (ref 37–153)
ALT SERPL-CCNC: 18 U/L (ref 6–29)
AST SERPL-CCNC: 16 U/L (ref 10–35)
BASOPHILS # BLD AUTO: 58 CELLS/UL (ref 0–200)
BASOPHILS NFR BLD AUTO: 0.8 %
BILIRUB SERPL-MCNC: 0.6 MG/DL (ref 0.2–1.2)
BUN SERPL-MCNC: 11 MG/DL (ref 7–25)
BUN/CREAT SERPL: NORMAL (CALC) (ref 6–22)
CALCIUM SERPL-MCNC: 9.2 MG/DL (ref 8.6–10.4)
CHLORIDE SERPL-SCNC: 105 MMOL/L (ref 98–110)
CHOLEST SERPL-MCNC: 220 MG/DL
CHOLEST/HDLC SERPL: 3.6 (CALC)
CO2 SERPL-SCNC: 26 MMOL/L (ref 20–32)
CREAT SERPL-MCNC: 0.62 MG/DL (ref 0.5–1.03)
EOSINOPHIL # BLD AUTO: 131 CELLS/UL (ref 15–500)
EOSINOPHIL NFR BLD AUTO: 1.8 %
ERYTHROCYTE [DISTWIDTH] IN BLOOD BY AUTOMATED COUNT: 12.1 % (ref 11–15)
GFR/BSA.PRED SERPLBLD CYS-BASED-ARV: 108 ML/MIN/1.73M2
GLOBULIN SER CALC-MCNC: 2.5 G/DL (CALC) (ref 1.9–3.7)
GLUCOSE SERPL-MCNC: 80 MG/DL (ref 65–99)
HBA1C MFR BLD: 5.2 %
HCT VFR BLD AUTO: 39.5 % (ref 35–45)
HCV AB SERPL QL IA: NORMAL
HDLC SERPL-MCNC: 61 MG/DL
HGB BLD-MCNC: 13.2 G/DL (ref 11.7–15.5)
LDLC SERPL CALC-MCNC: 125 MG/DL (CALC)
LYMPHOCYTES # BLD AUTO: 1789 CELLS/UL (ref 850–3900)
LYMPHOCYTES NFR BLD AUTO: 24.5 %
MCH RBC QN AUTO: 30.3 PG (ref 27–33)
MCHC RBC AUTO-ENTMCNC: 33.4 G/DL (ref 32–36)
MCV RBC AUTO: 90.6 FL (ref 80–100)
MONOCYTES # BLD AUTO: 540 CELLS/UL (ref 200–950)
MONOCYTES NFR BLD AUTO: 7.4 %
NEUTROPHILS # BLD AUTO: 4782 CELLS/UL (ref 1500–7800)
NEUTROPHILS NFR BLD AUTO: 65.5 %
NONHDLC SERPL-MCNC: 159 MG/DL (CALC)
PLATELET # BLD AUTO: 297 THOUSAND/UL (ref 140–400)
PMV BLD REES-ECKER: 11.1 FL (ref 7.5–12.5)
POTASSIUM SERPL-SCNC: 4.3 MMOL/L (ref 3.5–5.3)
PROT SERPL-MCNC: 6.6 G/DL (ref 6.1–8.1)
RBC # BLD AUTO: 4.36 MILLION/UL (ref 3.8–5.1)
SODIUM SERPL-SCNC: 139 MMOL/L (ref 135–146)
TRIGL SERPL-MCNC: 216 MG/DL
TSH SERPL-ACNC: 2.36 MIU/L
WBC # BLD AUTO: 7.3 THOUSAND/UL (ref 3.8–10.8)

## (undated) DEVICE — VAPR COOLPULSE 90 ELECTRODE WITH HAND CONTROLS 90 DEGREES SUCTION WITH INTEGRATED HANDPIECE: Brand: VAPR COOLPULSE

## (undated) DEVICE — TIBURON BEACH CHAIR SHOULDER DRAPE: Brand: CONVERTORS

## (undated) DEVICE — NEEDLE SUT SCORPION MULTIFIRE

## (undated) DEVICE — TUBING ARTHROSCOPIC WAVE  MAIN PUMP

## (undated) DEVICE — POSITIONER TRIMANO LIMB BEACH CHAIR

## (undated) DEVICE — SYRINGE 50ML LL

## (undated) DEVICE — OCCLUSIVE GAUZE STRIP,3% BISMUTH TRIBROMOPHENATE IN PETROLATUM BLEND: Brand: XEROFORM

## (undated) DEVICE — GLOVE INDICATOR PI UNDERGLOVE SZ 6.5 BLUE

## (undated) DEVICE — GLOVE SRG BIOGEL 6.5

## (undated) DEVICE — CANNULA 5.75 X 70MM BARREL SHAPED BOWL

## (undated) DEVICE — GLOVE SRG BIOGEL 7.5

## (undated) DEVICE — ASTOUND IMPERVIOUS SURGICAL GOWN: Brand: CONVERTORS

## (undated) DEVICE — GLOVE INDICATOR PI UNDERGLOVE SZ 7.5 BLUE

## (undated) DEVICE — 3M™ TEGADERM™ TRANSPARENT FILM DRESSING FRAME STYLE, 1626W, 4 IN X 4-3/4 IN (10 CM X 12 CM), 50/CT 4CT/CASE: Brand: 3M™ TEGADERM™

## (undated) DEVICE — REAMER PILOTED HEAD 7 MM

## (undated) DEVICE — INTENDED FOR TISSUE SEPARATION, AND OTHER PROCEDURES THAT REQUIRE A SHARP SURGICAL BLADE TO PUNCTURE OR CUT.: Brand: BARD-PARKER SAFETY BLADES SIZE 11, STERILE

## (undated) DEVICE — CHLORAPREP HI-LITE 26ML ORANGE

## (undated) DEVICE — TUBING SUCTION 5MM X 12 FT

## (undated) DEVICE — PACK ARTHROSCOPY

## (undated) DEVICE — BURR  OVAL 4MM 13CM 8 FLUTE COOLCUT

## (undated) DEVICE — SUT FIBERWIRE #2 1/2 CIRCLE T-5 38IN AR-7200

## (undated) DEVICE — BLADE SHAVER TORPEDO 4MM 13CM  COOLCUT

## (undated) DEVICE — DUAL SPIKE ADAPTER: Brand: CONMED